# Patient Record
Sex: FEMALE | Race: WHITE | Employment: UNEMPLOYED | URBAN - METROPOLITAN AREA
[De-identification: names, ages, dates, MRNs, and addresses within clinical notes are randomized per-mention and may not be internally consistent; named-entity substitution may affect disease eponyms.]

---

## 2018-03-31 ENCOUNTER — APPOINTMENT (OUTPATIENT)
Dept: GENERAL RADIOLOGY | Age: 28
End: 2018-03-31
Attending: FAMILY MEDICINE

## 2018-03-31 ENCOUNTER — HOSPITAL ENCOUNTER (OUTPATIENT)
Age: 28
Discharge: HOME | End: 2018-03-31
Attending: FAMILY MEDICINE

## 2018-03-31 VITALS
RESPIRATION RATE: 18 BRPM | OXYGEN SATURATION: 100 % | DIASTOLIC BLOOD PRESSURE: 82 MMHG | HEART RATE: 90 BPM | SYSTOLIC BLOOD PRESSURE: 119 MMHG | TEMPERATURE: 99 F

## 2018-03-31 DIAGNOSIS — S93.401A SPRAIN OF RIGHT ANKLE, UNSPECIFIED LIGAMENT, INITIAL ENCOUNTER: Primary | ICD-10-CM

## 2018-03-31 PROCEDURE — 99203 OFFICE O/P NEW LOW 30 MIN: CPT

## 2018-03-31 PROCEDURE — 73610 X-RAY EXAM OF ANKLE: CPT | Performed by: FAMILY MEDICINE

## 2018-03-31 PROCEDURE — 73630 X-RAY EXAM OF FOOT: CPT | Performed by: FAMILY MEDICINE

## 2018-03-31 NOTE — ED INITIAL ASSESSMENT (HPI)
Per pt having right foot and ankle pain for one month has had different injuries. Stepped on screw, rolled ankle while moving and tripped over dog. Pt reports is avid runner.

## 2018-03-31 NOTE — ED PROVIDER NOTES
[address]: [address]    History   Patient presents with:  Lower Extremity Injury (musculoskeletal)    Stated Complaint: right ankle pain    HPI  64-year-old female presents with about a month of right foot and ank Musculoskeletal:        Right ankle: She exhibits normal range of motion, no swelling, no ecchymosis, no deformity, no laceration and normal pulse. Tenderness. Lateral malleolus and medial malleolus tenderness found.  No head of 5th metatarsal and no prox Hospital, X FOOT RT, 3/31/2007, 14:16.     INDICATIONS: Pain and swelling to right lateral metatarsals of foot for 1 month after several injuries.     TECHNIQUE: 3 views were obtained.       FINDINGS:   BONES: Normal.  No significant arthropathy, fracture, establish primary care        Medications Prescribed:  There are no discharge medications for this patient.

## 2018-03-31 NOTE — ED NOTES
Pt came on with air cast and crutches in place. Adjusted air cast for proper fitting. Pt leaving IC stable no acute distress noted.

## 2018-04-02 ENCOUNTER — APPOINTMENT (OUTPATIENT)
Dept: LAB | Facility: HOSPITAL | Age: 28
End: 2018-04-02
Attending: FAMILY MEDICINE

## 2018-04-02 ENCOUNTER — OFFICE VISIT (OUTPATIENT)
Dept: FAMILY MEDICINE CLINIC | Facility: CLINIC | Age: 28
End: 2018-04-02

## 2018-04-02 VITALS
HEART RATE: 92 BPM | DIASTOLIC BLOOD PRESSURE: 86 MMHG | WEIGHT: 141 LBS | BODY MASS INDEX: 22.13 KG/M2 | RESPIRATION RATE: 16 BRPM | OXYGEN SATURATION: 98 % | SYSTOLIC BLOOD PRESSURE: 132 MMHG | HEIGHT: 67 IN

## 2018-04-02 DIAGNOSIS — R10.9 ABDOMINAL PAIN, UNSPECIFIED ABDOMINAL LOCATION: ICD-10-CM

## 2018-04-02 DIAGNOSIS — T78.1XXA GASTROINTESTINAL FOOD SENSITIVITY: ICD-10-CM

## 2018-04-02 DIAGNOSIS — K58.9 IRRITABLE BOWEL SYNDROME, UNSPECIFIED TYPE: ICD-10-CM

## 2018-04-02 DIAGNOSIS — R10.9 ABDOMINAL PAIN, UNSPECIFIED ABDOMINAL LOCATION: Primary | ICD-10-CM

## 2018-04-02 PROCEDURE — 85652 RBC SED RATE AUTOMATED: CPT

## 2018-04-02 PROCEDURE — 83516 IMMUNOASSAY NONANTIBODY: CPT

## 2018-04-02 PROCEDURE — 83013 H PYLORI (C-13) BREATH: CPT

## 2018-04-02 PROCEDURE — 36415 COLL VENOUS BLD VENIPUNCTURE: CPT

## 2018-04-02 PROCEDURE — 99204 OFFICE O/P NEW MOD 45 MIN: CPT | Performed by: FAMILY MEDICINE

## 2018-04-02 PROCEDURE — 82784 ASSAY IGA/IGD/IGG/IGM EACH: CPT

## 2018-04-02 PROCEDURE — 86038 ANTINUCLEAR ANTIBODIES: CPT

## 2018-04-02 PROCEDURE — 86141 C-REACTIVE PROTEIN HS: CPT

## 2018-04-02 RX ORDER — DEXTROAMPHETAMINE SACCHARATE, AMPHETAMINE ASPARTATE, DEXTROAMPHETAMINE SULFATE AND AMPHETAMINE SULFATE 5; 5; 5; 5 MG/1; MG/1; MG/1; MG/1
20 TABLET ORAL 2 TIMES DAILY
Qty: 60 TABLET | Refills: 0 | Status: SHIPPED | OUTPATIENT
Start: 2018-04-02 | End: 2018-05-03

## 2018-04-02 NOTE — PATIENT INSTRUCTIONS
The products and items listed below (the “Products”)  and their claims have not been evaluated by the Food and Drug Administration. Dietary products are not intended to treat, prevent, mitigate or cure disease.  Ultimately, you must draw your own conclusion This is a carbon, soil-based product that helps to rebuild the tight junctions, or important connections between cells. These tight junctions get compromised by daily stress, reduce immune function and poor diet.  They are essential to the proper and health

## 2018-04-02 NOTE — PROGRESS NOTES
Tracey Garner is a 32year old female.   Patient presents with:  Urgent Care F/u: dos 3/31/18 for sprain of R ankle, feels weak 4x, stepped on screw  Abdominal Pain: abdominal cramps since HS      HPI:     Recently in inpatient hospital at [facility] Occupational History  None on file     Social History Main Topics   Smoking status: Never Smoker    Smokeless tobacco: Never Used    Alcohol use No    Drug use: Unknown     Other Topics Concern    Caffeine Concern Yes     Social History Narrative   None - SED RATE, WESTERGREN (AUTOMATED); Future  - GASTRO - INTERNAL    3. Irritable bowel syndrome, unspecified type  - GASTRO - INTERNAL    Evaluate for autoimmune disease and celiac disease  Referred to gastro for evaluation of GI symptoms, may need EGD.   Fo A liquid supplement for gut health. This is a carbon, soil-based product that helps to rebuild the tight junctions, or important connections between cells, in the intestines.  These tight junctions get compromised by daily stress, reduced immune function an This is a Food Sensitivity Test that measures sensitivities to up to 132 different foods, coloring and additives.  The Food Inflammation Test, also known as the FIT Test, was created by Eric López, Ph. D, who was involved in the creation of the first HIV/

## 2018-04-08 PROBLEM — K58.9 IRRITABLE BOWEL SYNDROME: Status: ACTIVE | Noted: 2018-04-08

## 2018-05-03 ENCOUNTER — OFFICE VISIT (OUTPATIENT)
Dept: FAMILY MEDICINE CLINIC | Facility: CLINIC | Age: 28
End: 2018-05-03

## 2018-05-03 VITALS
OXYGEN SATURATION: 99 % | BODY MASS INDEX: 22.29 KG/M2 | HEART RATE: 95 BPM | SYSTOLIC BLOOD PRESSURE: 110 MMHG | WEIGHT: 142 LBS | DIASTOLIC BLOOD PRESSURE: 62 MMHG | HEIGHT: 67 IN

## 2018-05-03 DIAGNOSIS — R10.9 ABDOMINAL PAIN, UNSPECIFIED ABDOMINAL LOCATION: ICD-10-CM

## 2018-05-03 DIAGNOSIS — K58.9 IRRITABLE BOWEL SYNDROME, UNSPECIFIED TYPE: Primary | ICD-10-CM

## 2018-05-03 DIAGNOSIS — F41.9 ANXIETY: ICD-10-CM

## 2018-05-03 DIAGNOSIS — F98.8 ATTENTION DEFICIT DISORDER, UNSPECIFIED HYPERACTIVITY PRESENCE: ICD-10-CM

## 2018-05-03 DIAGNOSIS — T78.1XXA GASTROINTESTINAL FOOD SENSITIVITY: ICD-10-CM

## 2018-05-03 PROCEDURE — 99214 OFFICE O/P EST MOD 30 MIN: CPT | Performed by: FAMILY MEDICINE

## 2018-05-03 RX ORDER — DEXTROAMPHETAMINE SACCHARATE, AMPHETAMINE ASPARTATE, DEXTROAMPHETAMINE SULFATE AND AMPHETAMINE SULFATE 5; 5; 5; 5 MG/1; MG/1; MG/1; MG/1
20 TABLET ORAL DAILY
Qty: 30 TABLET | Refills: 0 | Status: SHIPPED | OUTPATIENT
Start: 2018-07-02 | End: 2018-05-29

## 2018-05-03 RX ORDER — DEXTROAMPHETAMINE SACCHARATE, AMPHETAMINE ASPARTATE, DEXTROAMPHETAMINE SULFATE AND AMPHETAMINE SULFATE 5; 5; 5; 5 MG/1; MG/1; MG/1; MG/1
20 TABLET ORAL DAILY
Qty: 30 TABLET | Refills: 0 | Status: SHIPPED | OUTPATIENT
Start: 2018-05-03 | End: 2018-06-02

## 2018-05-03 RX ORDER — DEXTROAMPHETAMINE SACCHARATE, AMPHETAMINE ASPARTATE, DEXTROAMPHETAMINE SULFATE AND AMPHETAMINE SULFATE 5; 5; 5; 5 MG/1; MG/1; MG/1; MG/1
20 TABLET ORAL DAILY
Qty: 30 TABLET | Refills: 0 | Status: SHIPPED | OUTPATIENT
Start: 2018-06-02 | End: 2018-05-29

## 2018-05-03 RX ORDER — DIPHENHYDRAMINE HCL 25 MG
25 CAPSULE ORAL NIGHTLY PRN
COMMUNITY
End: 2020-03-03

## 2018-05-03 NOTE — PATIENT INSTRUCTIONS
The products and items listed below (the “Products”)  and their claims have not been evaluated by the Food and Drug Administration. Dietary products are not intended to treat, prevent, mitigate or cure disease.  Ultimately, you must draw your own conclusion Start taking 1/8 tsp daily and increase dose every 5-7 days until taking 1 tsp 3 times daily. Reduce to lowest dose tolerated if any reactions occur. Buy it online at  http://bCommunities.PushToTest/ or at the [address].           Hemp oil - CALM  For anxie

## 2018-05-03 NOTE — PROGRESS NOTES
Joselyn Torres is a 32year old female. Patient presents with: Follow - Up: regarding abdominal pain, anxiety, adhd  Follow - Up: ankle is feeling better      HPI:     Had been on zoloft 200mg in the past, caused severe amotivation.  Recently restarted [START ON 7/2/2018] amphetamine-dextroamphetamine (ADDERALL) 20 MG Oral Tab Take 1 tablet (20 mg total) by mouth daily.  Disp: 30 tablet Rfl: 0       SOCIAL HISTORY:     Social History  Social History   Marital status: Single  Spouse name: N/A    Years of e No orders of the defined types were placed in this encounter. Patient Instructions   The products and items listed below (the “Products”)  and their claims have not been evaluated by the Food and Drug Administration.  Dietary products are not intended A liquid supplement for gut health. This is a carbon, soil-based product that helps to rebuild the tight junctions, or important connections between cells, in the intestines.  These tight junctions get compromised by daily stress, reduced immune function an

## 2018-05-23 ENCOUNTER — APPOINTMENT (OUTPATIENT)
Dept: ULTRASOUND IMAGING | Facility: HOSPITAL | Age: 28
End: 2018-05-23
Attending: PHYSICIAN ASSISTANT
Payer: MEDICAID

## 2018-05-23 ENCOUNTER — HOSPITAL ENCOUNTER (EMERGENCY)
Facility: HOSPITAL | Age: 28
Discharge: HOME | End: 2018-05-23
Attending: PHYSICIAN ASSISTANT
Payer: MEDICAID

## 2018-05-23 ENCOUNTER — PATIENT MESSAGE (OUTPATIENT)
Dept: FAMILY MEDICINE CLINIC | Facility: CLINIC | Age: 28
End: 2018-05-23

## 2018-05-23 VITALS
SYSTOLIC BLOOD PRESSURE: 114 MMHG | DIASTOLIC BLOOD PRESSURE: 84 MMHG | HEART RATE: 69 BPM | TEMPERATURE: 98 F | RESPIRATION RATE: 15 BRPM | BODY MASS INDEX: 21.97 KG/M2 | WEIGHT: 140 LBS | HEIGHT: 67 IN | OXYGEN SATURATION: 98 %

## 2018-05-23 DIAGNOSIS — R30.0 DYSURIA: Primary | ICD-10-CM

## 2018-05-23 DIAGNOSIS — N30.00 ACUTE CYSTITIS WITHOUT HEMATURIA: ICD-10-CM

## 2018-05-23 DIAGNOSIS — Z97.5 IUD (INTRAUTERINE DEVICE) IN PLACE: Primary | ICD-10-CM

## 2018-05-23 PROCEDURE — 80048 BASIC METABOLIC PNL TOTAL CA: CPT | Performed by: PHYSICIAN ASSISTANT

## 2018-05-23 PROCEDURE — 93975 VASCULAR STUDY: CPT | Performed by: PHYSICIAN ASSISTANT

## 2018-05-23 PROCEDURE — 85025 COMPLETE CBC W/AUTO DIFF WBC: CPT | Performed by: PHYSICIAN ASSISTANT

## 2018-05-23 PROCEDURE — 96374 THER/PROPH/DIAG INJ IV PUSH: CPT

## 2018-05-23 PROCEDURE — 81025 URINE PREGNANCY TEST: CPT

## 2018-05-23 PROCEDURE — 87086 URINE CULTURE/COLONY COUNT: CPT | Performed by: PHYSICIAN ASSISTANT

## 2018-05-23 PROCEDURE — 87186 SC STD MICRODIL/AGAR DIL: CPT | Performed by: PHYSICIAN ASSISTANT

## 2018-05-23 PROCEDURE — 99284 EMERGENCY DEPT VISIT MOD MDM: CPT

## 2018-05-23 PROCEDURE — 76830 TRANSVAGINAL US NON-OB: CPT | Performed by: PHYSICIAN ASSISTANT

## 2018-05-23 PROCEDURE — 81001 URINALYSIS AUTO W/SCOPE: CPT | Performed by: PHYSICIAN ASSISTANT

## 2018-05-23 PROCEDURE — 76856 US EXAM PELVIC COMPLETE: CPT | Performed by: PHYSICIAN ASSISTANT

## 2018-05-23 PROCEDURE — 87077 CULTURE AEROBIC IDENTIFY: CPT | Performed by: PHYSICIAN ASSISTANT

## 2018-05-23 RX ORDER — CEPHALEXIN 500 MG/1
500 CAPSULE ORAL 4 TIMES DAILY
Qty: 28 CAPSULE | Refills: 0 | Status: SHIPPED | OUTPATIENT
Start: 2018-05-23 | End: 2018-05-30

## 2018-05-23 RX ORDER — KETOROLAC TROMETHAMINE 30 MG/ML
30 INJECTION, SOLUTION INTRAMUSCULAR; INTRAVENOUS ONCE
Status: COMPLETED | OUTPATIENT
Start: 2018-05-23 | End: 2018-05-23

## 2018-05-23 NOTE — ED INITIAL ASSESSMENT (HPI)
Patient presents to ER with c/o LLQ abd pain; patient recently had IUD placed 2 weeks ago.  + nausea.

## 2018-05-24 NOTE — ED PROVIDER NOTES
Patient Seen in: [facility] AND [facility] Emergency Department    History   Patient presents with:  Abdominal Pain    Stated Complaint: IUD problems    HPI    Merriam Cowden is a 32year old female who presents with chief complaint of left lower abdominal yvon Review of Systems    Positive for stated complaint: IUD problems  Other systems are as noted in HPI. Constitutional and vital signs reviewed. All other systems reviewed and negative except as noted above.     PSFH elements reviewed from today an extremities. Patient exhibits normal speech. Skin: Skin is normal to inspection. Warm and dry. No obvious bruising. No obvious rash.           ED Course     Labs Reviewed   URINALYSIS WITH CULTURE REFLEX - Abnormal; Notable for the following:        Re patient seen by Dr. Corita Dakins.     Disposition and Plan     Clinical Impression:  IUD (intrauterine device) in place  (primary encounter diagnosis)  Acute cystitis without hematuria    Disposition:  Discharge    Follow-up:  DO Tom Fan  RADHA 30

## 2018-05-24 NOTE — ED NOTES
Received pt from triage. Pt with c/o LLQ pain. Pt states she feels like it is her IUD that us causing her to have pain. Pt states she was unable to feel her \"IUD strings\" today. Pt states this pain has been going on for \"months. \" Abd tender, pt does no

## 2018-05-25 NOTE — TELEPHONE ENCOUNTER
From: Betty Rodriguez  To: Joselin Lin DO  Sent: 5/23/2018 7:12 AM CDT  Subject: Other    Hi Dr.    [facility], and I have been cramping and bleeding (not just spotting) and absolutely exhausted since May 4th.

## 2018-05-29 ENCOUNTER — OFFICE VISIT (OUTPATIENT)
Dept: GASTROENTEROLOGY | Facility: CLINIC | Age: 28
End: 2018-05-29

## 2018-05-29 VITALS
HEART RATE: 108 BPM | WEIGHT: 142 LBS | BODY MASS INDEX: 22.29 KG/M2 | HEIGHT: 67 IN | DIASTOLIC BLOOD PRESSURE: 87 MMHG | SYSTOLIC BLOOD PRESSURE: 125 MMHG

## 2018-05-29 DIAGNOSIS — K21.9 GASTROESOPHAGEAL REFLUX DISEASE, ESOPHAGITIS PRESENCE NOT SPECIFIED: ICD-10-CM

## 2018-05-29 DIAGNOSIS — K52.9 CHRONIC DIARRHEA: ICD-10-CM

## 2018-05-29 DIAGNOSIS — R10.32 LLQ PAIN: Primary | ICD-10-CM

## 2018-05-29 PROCEDURE — 99244 OFF/OP CNSLTJ NEW/EST MOD 40: CPT | Performed by: INTERNAL MEDICINE

## 2018-05-29 NOTE — H&P
History of present illness: This is a 41-year-old female patient referred by Dr. Ronnell Ewing for abdominal pain and cramping.   The patient was recently seen in the emergency room on May 23, 2018 for abdominal pain and underwent a workup which included ultras She denies illicit drug use. She is sexually active. She is on birth control    Family history: Reviewed, as above and as per medical record    Allergies no known drug allergies.   Patient states she has had extensive workup for allergies at [facility] indicated.

## 2018-05-29 NOTE — PROGRESS NOTES
HPI:    Patient ID: Ryan Flores is a 32year old female. HPI    Review of Systems   Constitutional: Negative for activity change, appetite change, chills, diaphoresis, fatigue, fever and unexpected weight change.    HENT: Negative for congestion, ea Allergies   PHYSICAL EXAM:   Physical Exam   Constitutional: She is oriented to person, place, and time. She appears well-developed and well-nourished. No distress. HENT:   Head: Normocephalic and atraumatic.    Mouth/Throat: Oropharynx is clear and moist

## 2018-05-29 NOTE — PATIENT INSTRUCTIONS
1.  Get stool test for Clostridium difficile toxin    2.   Schedule colonoscopy and EGD on the same day with split dose MiraLAX preparation and MAC at [facility].

## 2018-05-30 ENCOUNTER — TELEPHONE (OUTPATIENT)
Dept: GASTROENTEROLOGY | Facility: CLINIC | Age: 28
End: 2018-05-30

## 2018-05-30 DIAGNOSIS — K21.9 GASTROESOPHAGEAL REFLUX DISEASE, ESOPHAGITIS PRESENCE NOT SPECIFIED: ICD-10-CM

## 2018-05-30 DIAGNOSIS — R10.32 LLQ PAIN: Primary | ICD-10-CM

## 2018-05-30 DIAGNOSIS — K52.9 CHRONIC DIARRHEA: ICD-10-CM

## 2018-05-30 NOTE — TELEPHONE ENCOUNTER
Instructions     1. Get stool test for Clostridium difficile toxin     2.   Schedule colonoscopy and EGD on the same day with split dose MiraLAX preparation and MAC at [facility].        Routed to surgery scheduling

## 2018-05-31 ENCOUNTER — HOSPITAL ENCOUNTER (OUTPATIENT)
Age: 28
Discharge: HOME | End: 2018-05-31
Attending: FAMILY MEDICINE

## 2018-05-31 VITALS
SYSTOLIC BLOOD PRESSURE: 138 MMHG | DIASTOLIC BLOOD PRESSURE: 89 MMHG | WEIGHT: 142 LBS | BODY MASS INDEX: 22 KG/M2 | RESPIRATION RATE: 22 BRPM | HEART RATE: 81 BPM | OXYGEN SATURATION: 100 % | TEMPERATURE: 98 F

## 2018-05-31 DIAGNOSIS — R35.0 URINARY FREQUENCY: Primary | ICD-10-CM

## 2018-05-31 PROCEDURE — 87591 N.GONORRHOEAE DNA AMP PROB: CPT | Performed by: FAMILY MEDICINE

## 2018-05-31 PROCEDURE — 87086 URINE CULTURE/COLONY COUNT: CPT | Performed by: FAMILY MEDICINE

## 2018-05-31 PROCEDURE — 87491 CHLMYD TRACH DNA AMP PROBE: CPT | Performed by: FAMILY MEDICINE

## 2018-05-31 PROCEDURE — 99214 OFFICE O/P EST MOD 30 MIN: CPT

## 2018-05-31 PROCEDURE — 81025 URINE PREGNANCY TEST: CPT

## 2018-05-31 PROCEDURE — 81002 URINALYSIS NONAUTO W/O SCOPE: CPT

## 2018-05-31 RX ORDER — PHENAZOPYRIDINE HYDROCHLORIDE 100 MG/1
100 TABLET, FILM COATED ORAL 3 TIMES DAILY PRN
Qty: 9 TABLET | Refills: 0 | Status: SHIPPED | OUTPATIENT
Start: 2018-05-31 | End: 2018-06-07

## 2018-05-31 NOTE — ED INITIAL ASSESSMENT (HPI)
Urinary pressure, urinary frequency and urgency. Pt on antibx but not feeling better. Pt went to ER r/t pain. Pt unsure if fever, chills. Pt believes that she had allergic reaction last night, rash on arms.  Pt's uncle held her upper arms, works with [employer]

## 2018-05-31 NOTE — ED NOTES
Pt verbalized understanding of DC instructions. Will f/u as instructed. Will finish antibx as prescribed. Pt leaving IC stable.  Answered pt's questions to her satisfaction

## 2018-05-31 NOTE — ED PROVIDER NOTES
Patient Seen in: [address]    History   Patient presents with:  Urinary Symptoms (urologic)    Stated Complaint: uti symptoms; rash    HPI  64-year-old female presents to [address] [place] [address] as she was diagnosed with a UTI about a week reviewed and negative except as noted above.     Physical Exam   ED Triage Vitals [05/31/18 1341]  BP: 138/89  Pulse: 81  Resp: 22  Temp: 98.3 °F (36.8 °C)  Temp src: Oral  SpO2: 100 %  O2 Device: None (Room air)    Current:/89   Pulse 81   Temp 98.3 tearful and frustrated as she feels like her \"immune system is out of whack, she always feels like crap and believes there is a bigger systemic problem wrong. \"  Instructed to follow-up closely with her primary doctor for further evaluation and management

## 2018-06-01 ENCOUNTER — PATIENT MESSAGE (OUTPATIENT)
Dept: FAMILY MEDICINE CLINIC | Facility: CLINIC | Age: 28
End: 2018-06-01

## 2018-06-04 NOTE — TELEPHONE ENCOUNTER
CBLM to schedule procedure. [phone] or [phone] for scheduling. Or please transfer to [facility] in GI if unavailable.

## 2018-06-05 NOTE — TELEPHONE ENCOUNTER
Scheduled for:  Colonoscopy - 15809 & EGD - 52069  Provider Name:  Dr. Mirtha Hernadez  Date:  6/26/18  Location:  [facility]  Sedation:  MAC  Time:  2:00 pm (pt is aware to arrive at 1:00 pm)  Prep:  Miralax/Gatorade, Prep instructions were given to pt over the phone

## 2018-06-05 NOTE — TELEPHONE ENCOUNTER
Pt returned call. Pt is only available until 1pm today or after 5pm.  Please call. Attempted to transfer/no answer.

## 2018-06-14 ENCOUNTER — OFFICE VISIT (OUTPATIENT)
Dept: FAMILY MEDICINE CLINIC | Facility: CLINIC | Age: 28
End: 2018-06-14

## 2018-06-14 VITALS
HEIGHT: 67 IN | WEIGHT: 138 LBS | OXYGEN SATURATION: 98 % | DIASTOLIC BLOOD PRESSURE: 70 MMHG | SYSTOLIC BLOOD PRESSURE: 140 MMHG | HEART RATE: 98 BPM | BODY MASS INDEX: 21.66 KG/M2

## 2018-06-14 DIAGNOSIS — F41.9 ANXIETY: ICD-10-CM

## 2018-06-14 DIAGNOSIS — R10.9 ABDOMINAL PAIN, UNSPECIFIED ABDOMINAL LOCATION: ICD-10-CM

## 2018-06-14 DIAGNOSIS — K58.9 IRRITABLE BOWEL SYNDROME, UNSPECIFIED TYPE: Primary | ICD-10-CM

## 2018-06-14 PROCEDURE — 99214 OFFICE O/P EST MOD 30 MIN: CPT | Performed by: FAMILY MEDICINE

## 2018-06-14 NOTE — PROGRESS NOTES
Sanna Cassie is a 32year old female.   Patient presents with:  Migraine: c/o left side cramping and pain,       HPI:     Still getting cramping, bloating, abdominal pain  Feels like she could be allergic to the generic zoloft  Mood has been better with Alcohol use No    Drug use: Unknown     Other Topics Concern    Caffeine Concern Yes     Social History Narrative   None on file       SURGICAL HISTORY:   No past surgical history on file.     PHYSICAL EXAM:      06/14/18  1334   BP: 140/70   Pulse: 98   S The products and items listed below (the “Products”)  and their claims have not been evaluated by the Food and Drug Administration. Dietary products are not intended to treat, prevent, mitigate or cure disease.  Ultimately, you must draw your own conclusion

## 2018-06-22 ENCOUNTER — TELEPHONE (OUTPATIENT)
Dept: GASTROENTEROLOGY | Facility: CLINIC | Age: 28
End: 2018-06-22

## 2018-06-22 DIAGNOSIS — K52.9 CHRONIC DIARRHEA: ICD-10-CM

## 2018-06-22 DIAGNOSIS — K21.9 GASTROESOPHAGEAL REFLUX DISEASE, ESOPHAGITIS PRESENCE NOT SPECIFIED: ICD-10-CM

## 2018-06-22 DIAGNOSIS — R10.32 ABDOMINAL PAIN, LEFT LOWER QUADRANT: Primary | ICD-10-CM

## 2018-06-22 NOTE — TELEPHONE ENCOUNTER
Cancelled for:  Colonoscopy 67317 and  EGD [id number]  Provider Name:  Dr. Herson Carter  Date:  6/26/18  Location:  [address] [place] [address],[facility]  Sedation:  MAC  Time:  1400  Prep:  Miralax/Gatorade  Meds/Allergies Reconciled?:    Diagnosis with codes:  LLQ pain - R10.32, Chronic diarrhea - K

## 2018-06-23 ENCOUNTER — APPOINTMENT (OUTPATIENT)
Dept: GENERAL RADIOLOGY | Age: 28
End: 2018-06-23
Attending: FAMILY MEDICINE
Payer: MEDICAID

## 2018-06-23 ENCOUNTER — HOSPITAL ENCOUNTER (OUTPATIENT)
Age: 28
Discharge: HOME | End: 2018-06-23
Attending: FAMILY MEDICINE
Payer: MEDICAID

## 2018-06-23 VITALS
HEART RATE: 92 BPM | TEMPERATURE: 99 F | OXYGEN SATURATION: 100 % | RESPIRATION RATE: 20 BRPM | DIASTOLIC BLOOD PRESSURE: 74 MMHG | SYSTOLIC BLOOD PRESSURE: 121 MMHG

## 2018-06-23 DIAGNOSIS — M54.50 CHRONIC BILATERAL LOW BACK PAIN WITHOUT SCIATICA: ICD-10-CM

## 2018-06-23 DIAGNOSIS — G89.29 CHRONIC BILATERAL LOW BACK PAIN WITHOUT SCIATICA: ICD-10-CM

## 2018-06-23 DIAGNOSIS — M54.2 CHRONIC NECK PAIN: Primary | ICD-10-CM

## 2018-06-23 DIAGNOSIS — G89.29 CHRONIC NECK PAIN: Primary | ICD-10-CM

## 2018-06-23 PROCEDURE — 81025 URINE PREGNANCY TEST: CPT

## 2018-06-23 PROCEDURE — 72100 X-RAY EXAM L-S SPINE 2/3 VWS: CPT | Performed by: FAMILY MEDICINE

## 2018-06-23 PROCEDURE — 99214 OFFICE O/P EST MOD 30 MIN: CPT

## 2018-06-23 PROCEDURE — 72040 X-RAY EXAM NECK SPINE 2-3 VW: CPT | Performed by: FAMILY MEDICINE

## 2018-06-23 RX ORDER — DEXTROAMPHETAMINE SACCHARATE, AMPHETAMINE ASPARTATE MONOHYDRATE, DEXTROAMPHETAMINE SULFATE AND AMPHETAMINE SULFATE 1.25; 1.25; 1.25; 1.25 MG/1; MG/1; MG/1; MG/1
5 CAPSULE, EXTENDED RELEASE ORAL EVERY MORNING
COMMUNITY
End: 2018-07-23

## 2018-06-23 NOTE — ED INITIAL ASSESSMENT (HPI)
Patient presents with low back and cervical neck pain which she states is chronic. She is requesting x-rays of both areas today because she states she is constantly hurting herself.

## 2018-06-23 NOTE — ED PROVIDER NOTES
[address] Seen [address]: [address]    History   Patient presents with:  Back Pain (musculoskeletal)    Stated Complaint: LOWER LUMBAR    HPI  64-year-old female with a past medical history significant for ADD, anxiety and depre Neck: Normal range of motion. Neck supple. Cardiovascular: Normal rate. Pulmonary/Chest: Effort normal.   Musculoskeletal:        Cervical back: Normal.        Lumbar back: Normal.   Strength 5/5 throughout. Skin: She is not diaphoretic.    Nursin SPINE AP LAT OBLS, 3/05/2013, 16:43.     INDICATIONS: Chronic, consistent lower lumbar spine pain for over 1 year.  No known injury.     TECHNIQUE: Lumbar spine radiographs (4 views)       FINDINGS:      ALIGNMENT: Normal alignment.    VERTEBRAL BODIES:

## 2018-07-23 ENCOUNTER — OFFICE VISIT (OUTPATIENT)
Dept: OBGYN CLINIC | Facility: CLINIC | Age: 28
End: 2018-07-23
Payer: MEDICAID

## 2018-07-23 ENCOUNTER — OFFICE VISIT (OUTPATIENT)
Dept: FAMILY MEDICINE CLINIC | Facility: CLINIC | Age: 28
End: 2018-07-23
Payer: MEDICAID

## 2018-07-23 ENCOUNTER — APPOINTMENT (OUTPATIENT)
Dept: LAB | Facility: HOSPITAL | Age: 28
End: 2018-07-23
Attending: CLINICAL NURSE SPECIALIST
Payer: MEDICAID

## 2018-07-23 VITALS
HEART RATE: 83 BPM | DIASTOLIC BLOOD PRESSURE: 76 MMHG | WEIGHT: 147 LBS | BODY MASS INDEX: 23 KG/M2 | SYSTOLIC BLOOD PRESSURE: 112 MMHG

## 2018-07-23 VITALS
WEIGHT: 143 LBS | SYSTOLIC BLOOD PRESSURE: 118 MMHG | DIASTOLIC BLOOD PRESSURE: 82 MMHG | OXYGEN SATURATION: 98 % | HEART RATE: 92 BPM | HEIGHT: 67 IN | BODY MASS INDEX: 22.44 KG/M2

## 2018-07-23 DIAGNOSIS — K58.9 IRRITABLE BOWEL SYNDROME, UNSPECIFIED TYPE: Primary | ICD-10-CM

## 2018-07-23 DIAGNOSIS — G43.909 MIGRAINE WITHOUT STATUS MIGRAINOSUS, NOT INTRACTABLE, UNSPECIFIED MIGRAINE TYPE: ICD-10-CM

## 2018-07-23 DIAGNOSIS — R10.2 PELVIC PAIN: ICD-10-CM

## 2018-07-23 DIAGNOSIS — F98.8 ATTENTION DEFICIT DISORDER, UNSPECIFIED HYPERACTIVITY PRESENCE: ICD-10-CM

## 2018-07-23 DIAGNOSIS — T78.40XD ALLERGIC REACTION, SUBSEQUENT ENCOUNTER: ICD-10-CM

## 2018-07-23 DIAGNOSIS — N93.8 DUB (DYSFUNCTIONAL UTERINE BLEEDING): ICD-10-CM

## 2018-07-23 DIAGNOSIS — F32.89 OTHER DEPRESSION: ICD-10-CM

## 2018-07-23 DIAGNOSIS — Z83.3 FAMILY HISTORY OF DIABETES MELLITUS: ICD-10-CM

## 2018-07-23 DIAGNOSIS — F41.9 ANXIETY: ICD-10-CM

## 2018-07-23 DIAGNOSIS — M62.89 PELVIC FLOOR TENSION: ICD-10-CM

## 2018-07-23 DIAGNOSIS — M54.50 CHRONIC BILATERAL LOW BACK PAIN WITHOUT SCIATICA: ICD-10-CM

## 2018-07-23 DIAGNOSIS — M54.2 NECK PAIN: ICD-10-CM

## 2018-07-23 DIAGNOSIS — G89.29 CHRONIC BILATERAL LOW BACK PAIN WITHOUT SCIATICA: ICD-10-CM

## 2018-07-23 DIAGNOSIS — N89.8 VAGINAL DISCHARGE: ICD-10-CM

## 2018-07-23 DIAGNOSIS — R10.2 PELVIC PAIN: Primary | ICD-10-CM

## 2018-07-23 DIAGNOSIS — R10.9 ABDOMINAL PAIN, UNSPECIFIED ABDOMINAL LOCATION: ICD-10-CM

## 2018-07-23 PROBLEM — F32.A DEPRESSION: Status: ACTIVE | Noted: 2018-07-23

## 2018-07-23 LAB — TSH SERPL-ACNC: 1.28 UIU/ML (ref 0.45–5.33)

## 2018-07-23 PROCEDURE — 36415 COLL VENOUS BLD VENIPUNCTURE: CPT

## 2018-07-23 PROCEDURE — 99215 OFFICE O/P EST HI 40 MIN: CPT | Performed by: FAMILY MEDICINE

## 2018-07-23 PROCEDURE — 84443 ASSAY THYROID STIM HORMONE: CPT

## 2018-07-23 PROCEDURE — 83036 HEMOGLOBIN GLYCOSYLATED A1C: CPT

## 2018-07-23 PROCEDURE — 99204 OFFICE O/P NEW MOD 45 MIN: CPT | Performed by: CLINICAL NURSE SPECIALIST

## 2018-07-23 RX ORDER — LEVONORGESTREL / ETHINYL ESTRADIOL AND ETHINYL ESTRADIOL 150-30(84)
1 KIT ORAL
COMMUNITY
Start: 2017-11-09 | End: 2018-07-23

## 2018-07-23 RX ORDER — DEXTROAMPHETAMINE SACCHARATE, AMPHETAMINE ASPARTATE, DEXTROAMPHETAMINE SULFATE AND AMPHETAMINE SULFATE 5; 5; 5; 5 MG/1; MG/1; MG/1; MG/1
TABLET ORAL
Refills: 0 | COMMUNITY
Start: 2018-07-02 | End: 2018-07-23

## 2018-07-23 RX ORDER — ALBUTEROL SULFATE 90 UG/1
2 AEROSOL, METERED RESPIRATORY (INHALATION)
COMMUNITY
Start: 2017-11-09 | End: 2019-11-21

## 2018-07-23 RX ORDER — CETIRIZINE HYDROCHLORIDE 10 MG/1
10 TABLET ORAL
COMMUNITY
End: 2018-07-26

## 2018-07-23 RX ORDER — MONTELUKAST SODIUM 10 MG/1
10 TABLET ORAL
COMMUNITY
Start: 2017-11-09 | End: 2019-10-02

## 2018-07-23 RX ORDER — DEXTROAMPHETAMINE SACCHARATE, AMPHETAMINE ASPARTATE MONOHYDRATE, DEXTROAMPHETAMINE SULFATE AND AMPHETAMINE SULFATE 1.25; 1.25; 1.25; 1.25 MG/1; MG/1; MG/1; MG/1
5 CAPSULE, EXTENDED RELEASE ORAL EVERY MORNING
Qty: 30 CAPSULE | Refills: 0 | Status: SHIPPED | OUTPATIENT
Start: 2018-07-23 | End: 2019-11-21

## 2018-07-23 NOTE — PROGRESS NOTES
Zenaida Collet is a 25year old female. Patient presents with: Follow - Up      HPI:     Migraines are better off the sertraline. Still getting them   3-4x per week, but not as intense.      Had an intense episode while driving, felt like she couldn't f IUD by Intrauterine route. Disp:  Rfl:    MISC NATURAL PRODUCT OP Apply to eye. Ashwaganda, Medical Cannabis, Turmeric, Curcumin, Adrenal Health herbs, all taken once daily.  Disp:  Rfl:    Levocetirizine Dihydrochloride (XYZAL) 5 MG Oral Tab Take 1 tablet Migraine without status migrainosus, not intractable, unspecified migraine type  - CT BRAIN OR HEAD (52712); Future    6. Other depression    7. Allergic reaction, subsequent encounter  - ALLERGY - INTERNAL    8.  Neck pain  - PHYSICAL THERAPY - INTERNAL

## 2018-07-24 LAB — HBA1C MFR BLD: 5.2 % (ref 4–6)

## 2018-07-25 LAB
GENITAL VAGINOSIS SCREEN: NEGATIVE
TRICHOMONAS SCREEN: NEGATIVE

## 2018-07-25 NOTE — PROGRESS NOTES
Zeina Jones is a 25year old female [facility] Patient's last menstrual period was 06/30/2018 (approximate). Patient presents with:  Gyn Problem: pelvic pain, irregular periods  New patient. Here with c/o irregular periods and ongoing pelvic pain.  Since able to figure out how to fix it\". Thinking she may have endometriosis and states \"I need a diagnosis so I can get assistance and have accommodations for work\".  Tried to explained endometriosis and reviewed normal US she had 5/23/18-states she already k control/ protection: IUD       MEDICAL HISTORY:        Past Medical History:   Diagnosis Date   • ADD (attention deficit disorder)    • Anxiety     not a current a issue   • Depression     not an issue   • Immunocompromised ([facility] [address]    • Migraine headache RELIEF) 25 MG Oral Cap, Take 25 mg by mouth nightly as needed for Itching., Disp: , Rfl:   •  Levonorgestrel 18.6 MCG/DAY Intrauterine IUD, by Intrauterine route., Disp: , Rfl:     ALLERGIES:    Sertraline              HIVES  Lexapro [Escitalopr*    OTHER there has already been an improvement in bleeding and pain  At least 45 min spent face to face with pt

## 2018-07-26 ENCOUNTER — OFFICE VISIT (OUTPATIENT)
Dept: ALLERGY | Facility: CLINIC | Age: 28
End: 2018-07-26
Payer: MEDICAID

## 2018-07-26 ENCOUNTER — LAB ENCOUNTER (OUTPATIENT)
Dept: LAB | Age: 28
End: 2018-07-26
Attending: ALLERGY & IMMUNOLOGY
Payer: MEDICAID

## 2018-07-26 VITALS
DIASTOLIC BLOOD PRESSURE: 75 MMHG | SYSTOLIC BLOOD PRESSURE: 98 MMHG | HEART RATE: 84 BPM | WEIGHT: 145.63 LBS | BODY MASS INDEX: 23.41 KG/M2 | OXYGEN SATURATION: 98 % | RESPIRATION RATE: 16 BRPM | TEMPERATURE: 98 F | HEIGHT: 66.3 IN

## 2018-07-26 DIAGNOSIS — Z91.018 FOOD ALLERGY: ICD-10-CM

## 2018-07-26 DIAGNOSIS — J30.9 ALLERGIC RHINITIS, UNSPECIFIED SEASONALITY, UNSPECIFIED TRIGGER: Primary | ICD-10-CM

## 2018-07-26 DIAGNOSIS — T78.1XXA ADVERSE FOOD REACTION, INITIAL ENCOUNTER: ICD-10-CM

## 2018-07-26 PROCEDURE — 36415 COLL VENOUS BLD VENIPUNCTURE: CPT

## 2018-07-26 PROCEDURE — 99244 OFF/OP CNSLTJ NEW/EST MOD 40: CPT | Performed by: ALLERGY & IMMUNOLOGY

## 2018-07-26 PROCEDURE — 86003 ALLG SPEC IGE CRUDE XTRC EA: CPT

## 2018-07-26 PROCEDURE — 86003 ALLG SPEC IGE CRUDE XTRC EA: CPT | Performed by: ALLERGY & IMMUNOLOGY

## 2018-07-26 PROCEDURE — 99212 OFFICE O/P EST SF 10 MIN: CPT | Performed by: ALLERGY & IMMUNOLOGY

## 2018-07-26 PROCEDURE — 82785 ASSAY OF IGE: CPT | Performed by: ALLERGY & IMMUNOLOGY

## 2018-07-26 RX ORDER — LEVOCETIRIZINE DIHYDROCHLORIDE 5 MG/1
5 TABLET, FILM COATED ORAL NIGHTLY
Qty: 30 TABLET | Refills: 0 | Status: SHIPPED | OUTPATIENT
Start: 2018-07-26 | End: 2019-11-21

## 2018-07-26 NOTE — PROGRESS NOTES
Marii Bonilla is a 25year old female. HPI:   Patient presents with: Allergies: New pt.  Pt presents with concern that her allergies are causing her to have swelling of skin, rash, hives of skin and c/o sinus reaction and asthma like reactions w/ cou Grandmother       Social History: Smoking status: Current Some Day Smoker                                                    Packs/day: 0.00      Years: 0.00      Smokeless tobacco: Never Used                      Comment: 1 cigarette a month  Alcohol use: bruising  Integumentary:  Negative for pruritus.  See hpi   Musculoskeletal:  Negative for joint symptoms  Neurological:  Negative for dizziness, seizures  Psychiatric:  Negative for inappropriate interaction and psychiatric symptoms  Respiratory:  Negative This Encounter      Allergens, Zone 8      Adult Food Allergy Prof      Lobster      Pineapple      Wes      Gluten    Meds This Visit:    Signed Prescriptions Disp Refills    Levocetirizine Dihydrochloride (XYZAL) 5 MG Oral Tab 30 tablet 0      Sig: [facility]

## 2018-07-26 NOTE — PATIENT INSTRUCTIONS
Recs:  Check serum IgE profile to common environmental allergens  Check serum IgE profile to common food allergens as well as lobster gluten and pineapple  We will call with results  Trial of Xyzal 5 mg once a night at bedtime  Handouts on allergies and av

## 2018-07-28 LAB
ALLERGEN, MANGO IGE: <0.1 KU/L
ALLERGEN, PINEAPPLE IGE: <0.1 KU/L

## 2018-07-31 ENCOUNTER — TELEPHONE (OUTPATIENT)
Dept: ALLERGY | Facility: CLINIC | Age: 28
End: 2018-07-31

## 2018-07-31 LAB
A ALTERNATA IGE QN: <0.1 KUA/L (ref ?–0.1)
A FUMIGATUS IGE QN: <0.1 KUA/L (ref ?–0.1)
AMER SYCAMORE IGE QN: <0.1 KUA/L (ref ?–0.1)
BERMUDA GRASS IGE QN: <0.1 KUA/L (ref ?–0.1)
BOXELDER IGE QN: <0.1 KUA/L (ref ?–0.1)
C HERBARUM IGE QN: <0.1 KUA/L (ref ?–0.1)
CALIF WALNUT IGE QN: <0.1 KUA/L (ref ?–0.1)
CAT DANDER IGE QN: <0.1 KUA/L (ref ?–0.1)
CLAM IGE QN: <0.1 KUA/L (ref ?–0.1)
CMN PIGWEED IGE QN: <0.1 KUA/L (ref ?–0.1)
CODFISH IGE QN: <0.1 KUA/L (ref ?–0.1)
COMMON RAGWEED IGE QN: <0.1 KUA/L (ref ?–0.1)
CORN IGE QN: <0.1 KUA/L (ref ?–0.1)
COTTONWOOD IGE QN: <0.1 KUA/L (ref ?–0.1)
COW MILK IGE QN: <0.1 KUA/L (ref ?–0.1)
D FARINAE IGE QN: <0.1 KUA/L (ref ?–0.1)
D PTERONYSS IGE QN: <0.1 KUA/L (ref ?–0.1)
DOG DANDER IGE QN: <0.1 KUA/L (ref ?–0.1)
EGG WHITE IGE QN: <0.1 KUA/L (ref ?–0.1)
GLUTEN IGE QN: <0.1 KUA/L (ref ?–0.1)
IGE SERPL-ACNC: 37.5 KU/L (ref 2–214)
IGE SERPL-ACNC: 37.5 KU/L (ref 2–214)
LOBSTER IGE QN: <0.1 KUA/L (ref ?–0.1)
M RACEMOSUS IGE QN: <0.1 KUA/L (ref ?–0.1)
MARSH ELDER IGE QN: <0.1 KUA/L (ref ?–0.1)
MOUSE EPITH IGE QN: <0.1 KUA/L (ref ?–0.1)
MT JUNIPER IGE QN: <0.1 KUA/L (ref ?–0.1)
P NOTATUM IGE QN: <0.1 KUA/L (ref ?–0.1)
PEANUT IGE QN: <0.1 KUA/L (ref ?–0.1)
PECAN/HICK TREE IGE QN: <0.1 KUA/L (ref ?–0.1)
ROACH IGE QN: <0.1 KUA/L (ref ?–0.1)
SALTWORT IGE QN: <0.1 KUA/L (ref ?–0.1)
SCALLOP IGE QN: <0.1 KUA/L (ref ?–0.1)
SESAME SEED IGE QN: <0.1 KUA/L (ref ?–0.1)
SHRIMP IGE QN: <0.1 KUA/L (ref ?–0.1)
SOYBEAN IGE QN: <0.1 KUA/L (ref ?–0.1)
TIMOTHY IGE QN: <0.1 KUA/L (ref ?–0.1)
WALNUT IGE QN: <0.1 KUA/L (ref ?–0.1)
WHEAT IGE QN: <0.1 KUA/L (ref ?–0.1)
WHITE ASH IGE QN: <0.1 KUA/L (ref ?–0.1)
WHITE ELM IGE QN: <0.1 KUA/L (ref ?–0.1)
WHITE MULBERRY IGE QN: <0.1 KUA/L (ref ?–0.1)
WHITE OAK IGE QN: <0.1 KUA/L (ref ?–0.1)

## 2018-07-31 NOTE — TELEPHONE ENCOUNTER
----- Message from Venkatesh Dennis MD sent at 7/30/2018  8:10 AM CDT -----  Elsi Franco, your serum IgE testing to select foods has returned and was negative to verito and plan. . This makes a food allergy less likely.   If these foods still continue to cause her

## 2018-07-31 NOTE — TELEPHONE ENCOUNTER
Spoke to patient. Verified name &  Reviewed w/ patient the following test result and recommendation in the message below per Dr. Jason Iqbal.     Also, reviewed w/ patient the following per Dr. Jason Iqbal via [employer]:    [place] Sero , Your blood testing/IgE testing  to

## 2018-08-01 ENCOUNTER — OFFICE VISIT (OUTPATIENT)
Dept: PHYSICAL THERAPY | Facility: HOSPITAL | Age: 28
End: 2018-08-01
Attending: FAMILY MEDICINE
Payer: MEDICAID

## 2018-08-01 DIAGNOSIS — G89.29 CHRONIC BILATERAL LOW BACK PAIN WITHOUT SCIATICA: ICD-10-CM

## 2018-08-01 DIAGNOSIS — M54.2 NECK PAIN: ICD-10-CM

## 2018-08-01 DIAGNOSIS — M54.50 CHRONIC BILATERAL LOW BACK PAIN WITHOUT SCIATICA: ICD-10-CM

## 2018-08-01 PROCEDURE — 97163 PT EVAL HIGH COMPLEX 45 MIN: CPT

## 2018-08-02 NOTE — PROGRESS NOTES
LUMBAR SPINE EVALUATION:   Referring Physician: Dr. Morena Jones  Diagnosis: Chronic bilateral low back pain without sciatica (M54.5,G89.29)  Neck pain (M54.2)     Date of Service: 8/1/2018   Date of Onset: February 2018     PATIENT SUMMARY   The patient is a feelings and feels safe in her environment. She vaguely reported symptoms of PTSD. Seeking professional assistance from Brutus Mcardle was discussed and the patient was receptive. She was given information to schedule an appt. PAS C/s 8/10;  Location: alarm system, resulting in hypersensitive nerves.  Metaphors incorporated to foster deep learning and paradigm shift for patient      Patient was instructed in and issued a HEP for: cervical stability; suboccipital release with tennis balls   Charges: PT Ev Date____________________    Certification From: 4/8/3109  To:10/30/2018

## 2018-08-07 ENCOUNTER — OFFICE VISIT (OUTPATIENT)
Dept: PHYSICAL THERAPY | Facility: HOSPITAL | Age: 28
End: 2018-08-07
Attending: CLINICAL NURSE SPECIALIST
Payer: MEDICAID

## 2018-08-07 DIAGNOSIS — M62.89 PELVIC FLOOR TENSION: ICD-10-CM

## 2018-08-07 PROCEDURE — 97162 PT EVAL MOD COMPLEX 30 MIN: CPT

## 2018-08-07 PROCEDURE — 97140 MANUAL THERAPY 1/> REGIONS: CPT

## 2018-08-07 PROCEDURE — 97535 SELF CARE MNGMENT TRAINING: CPT

## 2018-08-07 RX ORDER — DEXTROAMPHETAMINE SACCHARATE, AMPHETAMINE ASPARTATE MONOHYDRATE, DEXTROAMPHETAMINE SULFATE AND AMPHETAMINE SULFATE 1.25; 1.25; 1.25; 1.25 MG/1; MG/1; MG/1; MG/1
5 CAPSULE, EXTENDED RELEASE ORAL EVERY MORNING
Qty: 30 CAPSULE | Refills: 0 | OUTPATIENT
Start: 2018-08-07

## 2018-08-07 NOTE — PROGRESS NOTES
MUSCULOSKELETAL AND PELVIC FLOOR EVALUATION:   Referring Physician: Dr. Isabella Singletary  Diagnosis: Pelvic floor tension (N34.3)  Date of Onset: 5 years ago  Date of Service: 8/7/2018     PATIENT SUMMARY   Xiomara Kyle is a 25year old y/o female who presents pain, and bowel/bladder dysfunction. *Pt reported history of behavioral health treatment, stating she was recently hospitalized for excessive alcohol consumption and suicidal thoughts.  Per pt report, she has seen several phycologists in the past for de any dyspareunia with initial/deep penetration, but reports presence of clenching of pelvic floor, almost spasm like, non-specific to position/stimulation.  Reports she is able to reduce with relaxation/deep breathing, but sometimes has to cease intercourse Physical Therapy to address the above stated impairments in order to return patient to prior level of function. Patient was instructed in and administered HEP; patient verbally and visually demonstrated understanding.            OBJECTIVE:   PMH was discuss complaints: N/A  Urine Stream: sometimes difficulty initiating, stream starts off strong then sprays  Frequency: every 3-4 hours  Amount: medium   Urine Stop test: yes, able to stop  Post void dribble: sometimes   Hovering: no   Empty bladder just in case: WNL  Hip AROM/PROM screen: WNL    STRENGTH (MMT TESTING)  Hip: Left Right Comments   Flexion 4+/5 4+/5    Extension 4/5 4/5    Abduction 4/5 4/5    Adduction 5/5 5/5    External Rotation 4+/5 4+/5    Internal Rotation 4+/5 4+/5    Knee:      Flexion 5/5 5/ trigger point, pain and tenderness moderate restriction, trigger point, pain and tenderness    Iliococcygeus moderate restriction, trigger point, pain and tenderness moderate restriction, trigger point, pain and tenderness    Coccygeus moderate restriction throughout all 3 layers with internal vaginal exam to allow for yearly pelvic exams, tampon use, and intimacy with partner.    3. Patient to demonstrate improved PF strength, 3/5 MMT grade for at least 10 seconds endurance to reduce urinary/fecal incontinen

## 2018-08-08 ENCOUNTER — APPOINTMENT (OUTPATIENT)
Dept: PHYSICAL THERAPY | Facility: HOSPITAL | Age: 28
End: 2018-08-08
Attending: FAMILY MEDICINE
Payer: MEDICAID

## 2018-08-10 ENCOUNTER — OFFICE VISIT (OUTPATIENT)
Dept: PHYSICAL THERAPY | Facility: HOSPITAL | Age: 28
End: 2018-08-10
Attending: FAMILY MEDICINE
Payer: MEDICAID

## 2018-08-10 PROCEDURE — 97110 THERAPEUTIC EXERCISES: CPT

## 2018-08-10 PROCEDURE — 97112 NEUROMUSCULAR REEDUCATION: CPT

## 2018-08-10 PROCEDURE — 97140 MANUAL THERAPY 1/> REGIONS: CPT

## 2018-08-10 NOTE — PROGRESS NOTES
Diagnosis: Chronic bilateral low back pain without sciatica (M54.5,G89.29)  Neck pain (M54.2)         Authorized # of Visits: NA  Insurance: Medicaid           Next MD visit: (post therapy)    Fall Risk: standard         Precautions:  (migraine; Immunocomp 10 minutes at 3.0 mph necessary to participate in community events and return to the work force        Plan:   Improve spinal stability, posture, body mechanics, and physio-psycho-social condition     Charges: there ex 1; man mob x 1 ; neuro re-ed x1  Tota

## 2018-08-13 ENCOUNTER — APPOINTMENT (OUTPATIENT)
Dept: LAB | Facility: HOSPITAL | Age: 28
End: 2018-08-13
Attending: INTERNAL MEDICINE
Payer: MEDICAID

## 2018-08-13 ENCOUNTER — TELEPHONE (OUTPATIENT)
Dept: OBGYN CLINIC | Facility: CLINIC | Age: 28
End: 2018-08-13

## 2018-08-13 DIAGNOSIS — I73.00 RAYNAUD'S DISEASE WITHOUT GANGRENE: ICD-10-CM

## 2018-08-13 DIAGNOSIS — R53.83 FATIGUE, UNSPECIFIED TYPE: ICD-10-CM

## 2018-08-13 LAB
ALBUMIN SERPL BCP-MCNC: 3.8 G/DL (ref 3.5–4.8)
ALP SERPL-CCNC: 44 U/L (ref 32–100)
ALT SERPL-CCNC: 12 U/L (ref 14–54)
AST SERPL-CCNC: 13 U/L (ref 15–41)
BILIRUB DIRECT SERPL-MCNC: 0.1 MG/DL (ref 0–0.2)
BILIRUB SERPL-MCNC: 0.4 MG/DL (ref 0.3–1.2)
CRP SERPL-MCNC: <0.5 MG/DL (ref 0–0.9)
ERYTHROCYTE [SEDIMENTATION RATE] IN BLOOD: 6 MM/HR (ref 0–20)
PROT SERPL-MCNC: 6.6 G/DL (ref 5.9–8.4)
RHEUMATOID FACT SER QL: <5 IU/ML

## 2018-08-13 PROCEDURE — 80076 HEPATIC FUNCTION PANEL: CPT

## 2018-08-13 PROCEDURE — 84165 PROTEIN E-PHORESIS SERUM: CPT

## 2018-08-13 PROCEDURE — 86140 C-REACTIVE PROTEIN: CPT

## 2018-08-13 PROCEDURE — 86038 ANTINUCLEAR ANTIBODIES: CPT

## 2018-08-13 PROCEDURE — 86431 RHEUMATOID FACTOR QUANT: CPT

## 2018-08-13 PROCEDURE — 85652 RBC SED RATE AUTOMATED: CPT

## 2018-08-13 PROCEDURE — 36415 COLL VENOUS BLD VENIPUNCTURE: CPT

## 2018-08-13 NOTE — PROGRESS NOTES
Dear Dr. Franklin Snow:    I saw your patient Graeme Beebe in consultation this morning at your request for evaluation of possible autoimmune problem. As you know, she is a 78-year-old woman who has been felt to have irritable bowel syndrome.  She has anxiety, straightening, consistent with muscle spasm. Lumbar spine x-rays were normal.  Right ankle and foot x-rays were normal done March of 2018.     Past medical history:  She has a history of acid reflux which is been better with low acid diet, no gluten diet, hepatosplenomegaly or tenderness. Normal bowel sounds. No lower extremity edema. Neck moves well. Shoulders move well. Elbows flex and extend fully. There is no synovitis in her wrists or hands. Lumbar spine flexion is normal.  Hips move well.   Knee

## 2018-08-14 ENCOUNTER — TELEPHONE (OUTPATIENT)
Dept: FAMILY MEDICINE CLINIC | Facility: CLINIC | Age: 28
End: 2018-08-14

## 2018-08-14 ENCOUNTER — OFFICE VISIT (OUTPATIENT)
Dept: PHYSICAL THERAPY | Facility: HOSPITAL | Age: 28
End: 2018-08-14
Attending: CLINICAL NURSE SPECIALIST
Payer: MEDICAID

## 2018-08-14 DIAGNOSIS — M62.89 PELVIC FLOOR TENSION: ICD-10-CM

## 2018-08-14 PROCEDURE — 97535 SELF CARE MNGMENT TRAINING: CPT

## 2018-08-14 PROCEDURE — 97140 MANUAL THERAPY 1/> REGIONS: CPT

## 2018-08-14 NOTE — TELEPHONE ENCOUNTER
Spoke to pt who inquired about how she can go about finding another physician. She stated that she has been referred to several different physicians for different things and no one is helping her.  She also stated that she is getting solicitation phone call

## 2018-08-14 NOTE — PROGRESS NOTES
Dx: Pelvic floor tension (N34.3)          Authorized # of Visits/Insurance:  Medicaid  Script Dates: 8/7/18 - 10/2/18           Next MD visit: none scheduled  Referring MD: Corinne Bautista  Fall Risk:  standard         Precautions: n/a           Medication C possible onset of urge for BM. Plan: Continue to focus on pelvic floor down training/relaxation, BM retraining, with progression towards lumbopelvic strengthening as tolerated.      Charges: 2 Man, 1 SC       Total Timed Treatment: 40 min  Total Treatme

## 2018-08-14 NOTE — TELEPHONE ENCOUNTER
Pt called stating she has received several phone calls soliciting to her a mental health Dr. And requesting for her to make a appointment asap . Pt states they are pressuring her into seeing a mental health DrJeremiah Mcghee she does not need.  Pt was very nasty ov

## 2018-08-14 NOTE — TELEPHONE ENCOUNTER
----- Message from Mireille Shabazz PT sent at 8/2/2018  9:05 AM CDT -----  Regarding: referral behavioral health   Hi Dr. Sean Saxena,    I saw the above patient for her physical therapy evaluation on 8/1/18.  I wanted to inform you that the patient was very

## 2018-08-15 ENCOUNTER — APPOINTMENT (OUTPATIENT)
Dept: PHYSICAL THERAPY | Facility: HOSPITAL | Age: 28
End: 2018-08-15
Attending: FAMILY MEDICINE
Payer: MEDICAID

## 2018-08-15 ENCOUNTER — TELEPHONE (OUTPATIENT)
Dept: OBGYN CLINIC | Facility: CLINIC | Age: 28
End: 2018-08-15

## 2018-08-15 NOTE — TELEPHONE ENCOUNTER
Pt needs to be informed that Brina Watkins left her a message.   Pt needs to be informed that MAF can not see her today and she needs to schedule with another MD.

## 2018-08-16 LAB — NUCLEAR IGG TITR SER IF: NEGATIVE {TITER}

## 2018-08-17 ENCOUNTER — OFFICE VISIT (OUTPATIENT)
Dept: PHYSICAL THERAPY | Facility: HOSPITAL | Age: 28
End: 2018-08-17
Attending: FAMILY MEDICINE
Payer: MEDICAID

## 2018-08-17 LAB
ALBUMIN SERPL ELPH-MCNC: 3.94 G/DL (ref 3.75–5.21)
ALBUMIN/GLOB SERPL: 1.54 {RATIO} (ref 1–2)
ALPHA1 GLOB SERPL ELPH-MCNC: 0.23 G/DL (ref 0.19–0.46)
ALPHA2 GLOB SERPL ELPH-MCNC: 0.55 G/DL (ref 0.48–1.05)
B-GLOBULIN SERPL ELPH-MCNC: 0.68 G/DL (ref 0.68–1.23)
GAMMA GLOB SERPL ELPH-MCNC: 1.09 G/DL (ref 0.62–1.7)
TOTAL PROTEIN (SPECIAL TESTING): 6.5 G/DL (ref 6.5–9.1)

## 2018-08-17 PROCEDURE — 97140 MANUAL THERAPY 1/> REGIONS: CPT

## 2018-08-17 PROCEDURE — 97110 THERAPEUTIC EXERCISES: CPT

## 2018-08-17 NOTE — PROGRESS NOTES
Diagnosis: Chronic bilateral low back pain without sciatica (M54.5,G89.29)  Neck pain (M54.2)         Authorized # of Visits: NA  Insurance: Medicaid           Next MD visit: (post therapy)    Fall Risk: standard         Precautions:  (migraine; Immunocomp postural endurance.  Given RTB for HEP      * Pt informed that POC will change if she changes insurance to Jonnie Lamar as this clinic does not accept         Goals:  1) The patient will be safe and independent with a progressive HEP necessary to mange sympto

## 2018-08-21 ENCOUNTER — OFFICE VISIT (OUTPATIENT)
Dept: PHYSICAL THERAPY | Facility: HOSPITAL | Age: 28
End: 2018-08-21
Attending: CLINICAL NURSE SPECIALIST
Payer: MEDICAID

## 2018-08-21 ENCOUNTER — TELEPHONE (OUTPATIENT)
Dept: PHYSICAL THERAPY | Facility: HOSPITAL | Age: 28
End: 2018-08-21

## 2018-08-21 DIAGNOSIS — M62.89 PELVIC FLOOR TENSION: ICD-10-CM

## 2018-08-21 PROCEDURE — 97140 MANUAL THERAPY 1/> REGIONS: CPT

## 2018-08-21 PROCEDURE — 97535 SELF CARE MNGMENT TRAINING: CPT

## 2018-08-21 NOTE — PROGRESS NOTES
Dx: Pelvic floor tension (N34.3)          Authorized # of Visits/Insurance:  Medicaid  Script Dates: 8/7/18 - 10/2/18           Next MD visit: none scheduled  Referring MD: Zack Nicole  Fall Risk:  standard         Precautions: n/a           Medication C below        Assessment: Spent beginning of session discussing/addressing patient's frustrations/concerns with care at this hospital and alternative treatments she has sought out (i.e. Chiropractor/ortho MD).  Patient was visibly anxious/irritable initially

## 2018-08-22 ENCOUNTER — APPOINTMENT (OUTPATIENT)
Dept: PHYSICAL THERAPY | Facility: HOSPITAL | Age: 28
End: 2018-08-22
Attending: FAMILY MEDICINE
Payer: MEDICAID

## 2018-08-24 ENCOUNTER — OFFICE VISIT (OUTPATIENT)
Dept: PHYSICAL THERAPY | Facility: HOSPITAL | Age: 28
End: 2018-08-24
Attending: FAMILY MEDICINE
Payer: MEDICAID

## 2018-08-24 PROCEDURE — 97140 MANUAL THERAPY 1/> REGIONS: CPT

## 2018-08-24 PROCEDURE — 97110 THERAPEUTIC EXERCISES: CPT

## 2018-08-24 NOTE — PROGRESS NOTES
Diagnosis: Chronic bilateral low back pain without sciatica (M54.5,G89.29)  Neck pain (M54.2)         Authorized # of Visits: NA  Insurance: Medicaid           Next MD visit: (post therapy)    Fall Risk: standard         Precautions:  (migraine; Immunocomp rotation necessry to safely check blindspots while driving       4) The patient will increase walking tolerance with the ability to walk > 10 minutes at 3.0 mph necessary to participate in community events and return to the work force        Plan:   Mary Chaidez

## 2018-08-28 ENCOUNTER — OFFICE VISIT (OUTPATIENT)
Dept: PHYSICAL THERAPY | Facility: HOSPITAL | Age: 28
End: 2018-08-28
Attending: CLINICAL NURSE SPECIALIST
Payer: MEDICAID

## 2018-08-28 DIAGNOSIS — M62.89 PELVIC FLOOR TENSION: ICD-10-CM

## 2018-08-28 PROCEDURE — 97140 MANUAL THERAPY 1/> REGIONS: CPT

## 2018-08-28 NOTE — PROGRESS NOTES
Physical Therapy Discharge Summary  Reporting Period: 8/7 to 8/28  Dx: Pelvic floor tension (N34.3)          Authorized # of Visits/Insurance:  Medicaid  Script Dates: 8/7/18 - 10/2/18           Next MD visit: none scheduled  Referring MD: Vidal Gallardo perineal are and gluteal musculature. Notable release achieved and pt reported reduction in pain to mild-moderate at end of session. Reviewed HEP and discussed long term maintenance. Pt verbalized understanding.  Due to patient changing insurance to [employer]

## 2018-08-31 ENCOUNTER — OFFICE VISIT (OUTPATIENT)
Dept: PHYSICAL THERAPY | Facility: HOSPITAL | Age: 28
End: 2018-08-31
Attending: FAMILY MEDICINE
Payer: MEDICAID

## 2018-08-31 PROCEDURE — 97140 MANUAL THERAPY 1/> REGIONS: CPT

## 2018-08-31 NOTE — PROGRESS NOTES
Discharge Summary      Diagnosis: Chronic bilateral low back pain without sciatica (M54.5,G89.29)  Neck pain (M54.2)         Authorized # of Visits: NA  Insurance: Medicaid           Next MD visit: (post therapy)    Fall Risk: standard         Precautions: declined. She is progressing towards 50% of her LTGs.  The patient will be self-discharged as she changed her insurance, of which the hospital does not accept.         Goals:  1) The patient will be safe and independent with a progressive HEP necessary to m

## 2018-09-04 ENCOUNTER — APPOINTMENT (OUTPATIENT)
Dept: PHYSICAL THERAPY | Facility: HOSPITAL | Age: 28
End: 2018-09-04
Attending: CLINICAL NURSE SPECIALIST

## 2018-09-05 ENCOUNTER — APPOINTMENT (OUTPATIENT)
Dept: PHYSICAL THERAPY | Facility: HOSPITAL | Age: 28
End: 2018-09-05
Attending: FAMILY MEDICINE

## 2018-09-11 ENCOUNTER — APPOINTMENT (OUTPATIENT)
Dept: PHYSICAL THERAPY | Facility: HOSPITAL | Age: 28
End: 2018-09-11
Attending: CLINICAL NURSE SPECIALIST

## 2018-09-14 ENCOUNTER — APPOINTMENT (OUTPATIENT)
Dept: PHYSICAL THERAPY | Facility: HOSPITAL | Age: 28
End: 2018-09-14
Attending: FAMILY MEDICINE

## 2018-09-18 ENCOUNTER — APPOINTMENT (OUTPATIENT)
Dept: PHYSICAL THERAPY | Facility: HOSPITAL | Age: 28
End: 2018-09-18
Attending: CLINICAL NURSE SPECIALIST

## 2018-09-19 ENCOUNTER — APPOINTMENT (OUTPATIENT)
Dept: PHYSICAL THERAPY | Facility: HOSPITAL | Age: 28
End: 2018-09-19
Attending: FAMILY MEDICINE

## 2018-09-25 ENCOUNTER — APPOINTMENT (OUTPATIENT)
Dept: PHYSICAL THERAPY | Facility: HOSPITAL | Age: 28
End: 2018-09-25
Attending: CLINICAL NURSE SPECIALIST

## 2018-09-28 ENCOUNTER — TELEPHONE (OUTPATIENT)
Dept: FAMILY MEDICINE CLINIC | Facility: CLINIC | Age: 28
End: 2018-09-28

## 2018-09-28 ENCOUNTER — TELEPHONE (OUTPATIENT)
Dept: OBGYN CLINIC | Facility: CLINIC | Age: 28
End: 2018-09-28

## 2018-09-28 ENCOUNTER — APPOINTMENT (OUTPATIENT)
Dept: PHYSICAL THERAPY | Facility: HOSPITAL | Age: 28
End: 2018-09-28
Attending: FAMILY MEDICINE

## 2018-09-28 NOTE — TELEPHONE ENCOUNTER
Let pt know that her msg was sent to Dr. Fay Tatum to refill. Per pt, she will have to cx her appt as her ins co changed to [employer] and she will have to start seeing a new MD. Appt cx.

## 2018-09-28 NOTE — TELEPHONE ENCOUNTER
PER PT REQUESTING AN REFILL ON MEDICATION FOR YEAST INFECTION / PLS SEND SCRIPT  TO [facility] [place] [address] [address] PHONE # [phone] /  PHYLICIA ADV

## 2018-09-28 NOTE — TELEPHONE ENCOUNTER
Informed pt of [facility]. Offered an appt for the pt and she declined. She states that she is in between insurance and she can not see us any more and she can not see any other MD at the present d/t insurance. She states that she has no money. Pt was not happy that [address] [place] [address] would not prescribe a medication for her yeast.  \"She stated to ask him if he ever had a yeast infection\". Pt stated to have a good day and conversation ended. Sent to [address] [place] [address] and Cori Carbone as a FYI.

## 2018-09-28 NOTE — TELEPHONE ENCOUNTER
Pt calling stating that she has an yeast infection. She states that she gets them a lot. Pt has vaginal itching, discharge like cottage cheese, an odor and discomfort. She states she has treated her self with Monistat and needs something else. Pt will like a medication sent to pharmacy. Pt saw MAF on 7/23/18 and a genital vag was done and she was positive for 3+ Candida Albicans. Sent to [address] [place] [address], because [facility] is out of the office.

## 2018-10-10 ENCOUNTER — APPOINTMENT (OUTPATIENT)
Dept: GENERAL RADIOLOGY | Facility: HOSPITAL | Age: 28
End: 2018-10-10
Attending: NURSE PRACTITIONER
Payer: COMMERCIAL

## 2018-10-10 ENCOUNTER — HOSPITAL ENCOUNTER (EMERGENCY)
Facility: HOSPITAL | Age: 28
Discharge: HOME | End: 2018-10-10
Payer: COMMERCIAL

## 2018-10-10 VITALS
OXYGEN SATURATION: 100 % | SYSTOLIC BLOOD PRESSURE: 121 MMHG | HEIGHT: 66 IN | DIASTOLIC BLOOD PRESSURE: 84 MMHG | HEART RATE: 80 BPM | TEMPERATURE: 98 F | BODY MASS INDEX: 23.14 KG/M2 | RESPIRATION RATE: 11 BRPM | WEIGHT: 144 LBS

## 2018-10-10 DIAGNOSIS — K59.00 CONSTIPATION, UNSPECIFIED CONSTIPATION TYPE: ICD-10-CM

## 2018-10-10 DIAGNOSIS — R55 VASOVAGAL NEAR SYNCOPE: Primary | ICD-10-CM

## 2018-10-10 PROCEDURE — 80048 BASIC METABOLIC PNL TOTAL CA: CPT | Performed by: NURSE PRACTITIONER

## 2018-10-10 PROCEDURE — 93005 ELECTROCARDIOGRAM TRACING: CPT

## 2018-10-10 PROCEDURE — 85025 COMPLETE CBC W/AUTO DIFF WBC: CPT | Performed by: NURSE PRACTITIONER

## 2018-10-10 PROCEDURE — 85379 FIBRIN DEGRADATION QUANT: CPT | Performed by: NURSE PRACTITIONER

## 2018-10-10 PROCEDURE — 96372 THER/PROPH/DIAG INJ SC/IM: CPT

## 2018-10-10 PROCEDURE — 96360 HYDRATION IV INFUSION INIT: CPT

## 2018-10-10 PROCEDURE — 93010 ELECTROCARDIOGRAM REPORT: CPT | Performed by: NURSE PRACTITIONER

## 2018-10-10 PROCEDURE — 74019 RADEX ABDOMEN 2 VIEWS: CPT | Performed by: NURSE PRACTITIONER

## 2018-10-10 PROCEDURE — 81001 URINALYSIS AUTO W/SCOPE: CPT

## 2018-10-10 PROCEDURE — 99285 EMERGENCY DEPT VISIT HI MDM: CPT

## 2018-10-10 PROCEDURE — 96361 HYDRATE IV INFUSION ADD-ON: CPT

## 2018-10-10 PROCEDURE — 81025 URINE PREGNANCY TEST: CPT

## 2018-10-10 RX ORDER — DICYCLOMINE HYDROCHLORIDE 10 MG/ML
20 INJECTION INTRAMUSCULAR ONCE
Status: COMPLETED | OUTPATIENT
Start: 2018-10-10 | End: 2018-10-10

## 2018-10-10 NOTE — ED PROVIDER NOTES
Patient Seen in: [facility] AND [facility] Emergency Department    History   CC: near syncope  HPI: Joselyn Torres 25year old female  who presents to the ER c/o near syncopal episode today during hot yoga.   Patient states she was in a deep stretch and began t vital signs reviewed. All other systems reviewed and negative except as noted above. PSFH elements reviewed from today and agreed except as otherwise stated in HPI. Medications :   MISC NATURAL PRODUCT OP,  Apply to eye.  Idania Jameson without difficulty   Psych - Interactive and appropriate      ED Course     Labs Reviewed   URINALYSIS WITH CULTURE REFLEX - Abnormal; Notable for the following components:       Result Value    Clarity Urine Hazy (*)     Ketones Urine Trace (*)     Blood prone, and upright (or decubitus) radiographs of the abdomen were performed.       FINDINGS:   BOWEL GAS PATTERN: Normal bowel gas pattern.    FREE AIR:   None.    SOFT TISSUES: Normal. No masses or organomegaly.   CALCIFICATIONS: None significant.    BONES

## 2018-10-10 NOTE — ED INITIAL ASSESSMENT (HPI)
C/o near syncopal episodes last night and today. Denies vomiting or fevers. Pt states she has been constipated for 1 week, passing very small amounts of very hard stool and having LLQ abdominal cramping + spasms.  Pt found blood in the toilet, unsure if it

## 2019-02-18 NOTE — LETTER
No referring provider defined for this encounter. 10/12/22        Patient: Valentin Morrison   YOB: 1990   Date of Visit: 10/11/2022       Dear  Dr. Yane Rey, DO,      Thank you for referring Valentin Morrison to my practice. Please find my assessment and plan below. As you know she is a 68-year-old female with a history of asthma, chronic urticaria, anxiety, ADHD, irritable bowel syndrome, chronic low back pain who I now had the pleasure of seeing for evaluation of possible renal colic. The patient states that she is pretty certain that she has passed at least 2 or 3 stones spontaneously. She noted something in the toilet bowl but never retrieve these for stone analysis. More recently she went to the emergency room on September 20, 2022 and again on September 25, 2022 with bilateral flank pain. She had CT scans done on both dates and no clear-cut renal calculi or hydronephrosis was identified. The ER physician told her that she might of passed the stone. Patient though continues to have lower abdominal pain and left flank pain on a daily basis. Symptoms seem to wax and wane in intensity but never really go away. She just saw her gynecologist and she has been treated for vaginosis. She was also having urinary tract symptoms with frequency urgency and dysuria. However urine culture on October 3 was negative. Again continues to have those symptoms. Her past medical history as stated above. Medications are as listed. Gets intermittent epidural injections for chronic lower back pain but she really feels her current symptoms are different than her lumbar pain. Social history she is a non-smoker. Family history is positive for frequent urinary tract infections and kidney stones. Review of system patient was states she is doing well without any chest pain, shortness of breath, or GI symptoms.   Again continues to have these intermittent episodes of frequency urgency and dysuria. I reviewed this CT scan statin in September with the patient. I told her there is no obvious pathology that would explain her ongoing lower abdominal and left flank pain. If she actually did pass a stone spontaneously those symptoms should have resolved. I also reassured her that her renal function on both those dates were normal.  As she  continues to have urinary tract symptoms with a negative urine culture recommended that she see urology for further work-up and evaluation. She has an appoint with them on November 9. Otherwise reinforced the importance of drinking plenty of fluids. Strongly advised that if she thinks she is passing a stone spontaneously she should retrieve it and bring it in for stone analysis. I do not need to see her for follow-up unless you feel it is clinically indicated. Thank you very much for allowing me to participate in the care of your patient. If you have any questions please feel free to call.            Sincerely,   Indiana Ortega MD   [address]  [place] [address] [place] [address]  [address] [facility] [id number]    Document electronically generated by:  Indiana Ortega MD

## 2019-09-27 ENCOUNTER — HOSPITAL ENCOUNTER (EMERGENCY)
Facility: HOSPITAL | Age: 29
Discharge: HOME | End: 2019-09-27
Attending: EMERGENCY MEDICINE
Payer: MEDICAID

## 2019-09-27 ENCOUNTER — APPOINTMENT (OUTPATIENT)
Dept: CT IMAGING | Facility: HOSPITAL | Age: 29
End: 2019-09-27
Attending: EMERGENCY MEDICINE
Payer: MEDICAID

## 2019-09-27 VITALS
HEART RATE: 77 BPM | DIASTOLIC BLOOD PRESSURE: 74 MMHG | HEIGHT: 67 IN | RESPIRATION RATE: 14 BRPM | SYSTOLIC BLOOD PRESSURE: 109 MMHG | TEMPERATURE: 98 F | OXYGEN SATURATION: 99 % | WEIGHT: 160 LBS | BODY MASS INDEX: 25.11 KG/M2

## 2019-09-27 DIAGNOSIS — R55 SYNCOPE AND COLLAPSE: Primary | ICD-10-CM

## 2019-09-27 DIAGNOSIS — S00.03XA CONTUSION OF SCALP, INITIAL ENCOUNTER: ICD-10-CM

## 2019-09-27 PROCEDURE — 93005 ELECTROCARDIOGRAM TRACING: CPT

## 2019-09-27 PROCEDURE — 99284 EMERGENCY DEPT VISIT MOD MDM: CPT

## 2019-09-27 PROCEDURE — 80048 BASIC METABOLIC PNL TOTAL CA: CPT | Performed by: EMERGENCY MEDICINE

## 2019-09-27 PROCEDURE — 87086 URINE CULTURE/COLONY COUNT: CPT | Performed by: EMERGENCY MEDICINE

## 2019-09-27 PROCEDURE — 80048 BASIC METABOLIC PNL TOTAL CA: CPT

## 2019-09-27 PROCEDURE — 85025 COMPLETE CBC W/AUTO DIFF WBC: CPT | Performed by: EMERGENCY MEDICINE

## 2019-09-27 PROCEDURE — 81025 URINE PREGNANCY TEST: CPT

## 2019-09-27 PROCEDURE — 84484 ASSAY OF TROPONIN QUANT: CPT | Performed by: EMERGENCY MEDICINE

## 2019-09-27 PROCEDURE — 96360 HYDRATION IV INFUSION INIT: CPT

## 2019-09-27 PROCEDURE — 84484 ASSAY OF TROPONIN QUANT: CPT

## 2019-09-27 PROCEDURE — 85025 COMPLETE CBC W/AUTO DIFF WBC: CPT

## 2019-09-27 PROCEDURE — 93010 ELECTROCARDIOGRAM REPORT: CPT | Performed by: EMERGENCY MEDICINE

## 2019-09-27 PROCEDURE — 81001 URINALYSIS AUTO W/SCOPE: CPT | Performed by: EMERGENCY MEDICINE

## 2019-09-27 PROCEDURE — 70450 CT HEAD/BRAIN W/O DYE: CPT | Performed by: EMERGENCY MEDICINE

## 2019-09-27 RX ORDER — ACETAMINOPHEN 500 MG
1000 TABLET ORAL ONCE
Status: COMPLETED | OUTPATIENT
Start: 2019-09-27 | End: 2019-09-27

## 2019-09-27 NOTE — ED NOTES
Care assumed from [facility]. Patient resting on cart, in no apparent distress. Will continue to monitor.

## 2019-09-27 NOTE — ED INITIAL ASSESSMENT (HPI)
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 Pt states she hit her head and L hip.

## 2019-09-27 NOTE — ED PROVIDER NOTES
Patient Seen in: [facility] AND [facility] Emergency Department    History   Patient presents with:  Syncope (cardiovascular, neurologic)    Stated Complaint: Syncopal episode     HPI    Patient presents with passing out again.   She states she is done this a cou (XYZAL) 5 MG Oral Tab,  Take 1 tablet (5 mg total) by mouth nightly. Albuterol Sulfate HFA (PROAIR HFA) 108 (90 Base) MCG/ACT Inhalation Aero Soln,  Inhale 2 puffs into the lungs. Montelukast Sodium (SINGULAIR) 10 MG Oral Tab,  Take 10 mg by mouth.    A No cranial nerve asymmetry noted. Pupils are equal reactive extract movements are intact  Psychiatric:  Normal affect. Oriented. No unusual behavior. Interacting well.     We will do IV fluids orthostatics blood work urinalysis urine pregnancy CT scan ROUTINE   CBC W/ DIFFERENTIAL     EKG    Rate, intervals and axes as noted on EKG Report.   Rate: EKG shows rate 81 normal sinus rhythm no ST elevation or depression noted              MDM     100% Normal  Pulse oximetry    Cardiac Monitor: Normal sinus rhy

## 2019-10-02 ENCOUNTER — TELEPHONE (OUTPATIENT)
Dept: CARDIOLOGY CLINIC | Facility: CLINIC | Age: 29
End: 2019-10-02

## 2019-10-02 ENCOUNTER — OFFICE VISIT (OUTPATIENT)
Dept: FAMILY MEDICINE CLINIC | Facility: CLINIC | Age: 29
End: 2019-10-02
Payer: MEDICAID

## 2019-10-02 VITALS
DIASTOLIC BLOOD PRESSURE: 88 MMHG | HEART RATE: 85 BPM | BODY MASS INDEX: 24.01 KG/M2 | OXYGEN SATURATION: 95 % | WEIGHT: 153 LBS | HEIGHT: 67 IN | SYSTOLIC BLOOD PRESSURE: 130 MMHG

## 2019-10-02 DIAGNOSIS — R55 SYNCOPE, UNSPECIFIED SYNCOPE TYPE: Primary | ICD-10-CM

## 2019-10-02 PROCEDURE — 99213 OFFICE O/P EST LOW 20 MIN: CPT | Performed by: FAMILY MEDICINE

## 2019-10-02 RX ORDER — CLONAZEPAM 0.5 MG/1
TABLET ORAL
Refills: 0 | COMMUNITY
Start: 2019-06-04 | End: 2020-03-16

## 2019-10-02 RX ORDER — SODIUM CHLORIDE/SODIUM BICARB
1 PACKET (EA) NASAL
COMMUNITY
Start: 2017-12-27 | End: 2020-03-03

## 2019-10-02 RX ORDER — LEVONORGESTREL AND ETHINYL ESTRADIOL 0.1-0.02MG
KIT ORAL
Refills: 0 | COMMUNITY
Start: 2019-10-01 | End: 2019-11-21

## 2019-10-02 RX ORDER — ALBUTEROL SULFATE 90 UG/1
AEROSOL, METERED RESPIRATORY (INHALATION)
COMMUNITY
Start: 2018-11-13 | End: 2020-03-16

## 2019-10-02 NOTE — PROGRESS NOTES
HPI:    Patient ID: Monae Walker is a 30year old female who presents for ED f/u for syncope. HPI  Has had two episodes of syncope in total.  First episode was summer 2018 and was told she had a block in intestines (constipated).  Had a really bad mi • ADD (attention deficit disorder)    • Anxiety     not a current a issue   • Depression     not an issue   • Immunocompromised ([address]    • Migraine headache     [facility]   • UTI (urinary tract infection)         Current Outpatient Medications: Negative for pallor and rash. Neurological: Positive for dizziness, syncope, light-headedness and headaches. Negative for tremors, seizures, facial asymmetry, weakness and numbness.       10/02/19  7813 10/02/19  0852 10/02/19  0854 10/02/19  0856   BP: 1 Coordination and gait normal.   Skin: Skin is warm and dry. No rash noted. No pallor. Psychiatric: Judgment normal. Her mood appears anxious and depressed. Tearful at time during visit, particularly when talking about possible move to [place].

## 2019-10-02 NOTE — TELEPHONE ENCOUNTER
Hi Dr. Hernán Edwards,    An order for a 30 day event monitor for this patient generated in my in-basket. But when I checked to see the order in the chart, I see it was cancelled.     Does this patient require a 30 day event monitor? (Monitor is placed in our clin

## 2019-10-07 ENCOUNTER — TELEPHONE (OUTPATIENT)
Dept: CARDIOLOGY CLINIC | Facility: CLINIC | Age: 29
End: 2019-10-07

## 2019-10-07 NOTE — TELEPHONE ENCOUNTER
S/w pt and she states she called to get a event monitor . I can't find an order. She relayed that Kenrick Ruiz wanted it. informed her I will check with him.     S/w Dr. Kate Carter, as happened to be at same office,  confirmed that he wants pt to see cardiologist

## 2019-10-07 NOTE — TELEPHONE ENCOUNTER
Patient calling to set up 30 day event monitor as soon as possible patient going away, Tuesday night. Please call at:[phone],thanks.

## 2019-10-14 ENCOUNTER — OFFICE VISIT (OUTPATIENT)
Dept: CARDIOLOGY CLINIC | Facility: CLINIC | Age: 29
End: 2019-10-14
Payer: MEDICAID

## 2019-10-14 VITALS
RESPIRATION RATE: 15 BRPM | HEART RATE: 60 BPM | WEIGHT: 152 LBS | DIASTOLIC BLOOD PRESSURE: 84 MMHG | SYSTOLIC BLOOD PRESSURE: 121 MMHG | HEIGHT: 67 IN | BODY MASS INDEX: 23.86 KG/M2

## 2019-10-14 DIAGNOSIS — R55 VASOVAGAL SYNCOPE: ICD-10-CM

## 2019-10-14 DIAGNOSIS — I95.1 ORTHOSTATIC HYPOTENSION: ICD-10-CM

## 2019-10-14 DIAGNOSIS — R55 VASOVAGAL SYNDROME: Primary | ICD-10-CM

## 2019-10-14 PROCEDURE — 99245 OFF/OP CONSLTJ NEW/EST HI 55: CPT | Performed by: INTERNAL MEDICINE

## 2019-10-14 NOTE — H&P
[facility]    Cardiac Electrophysiology Consultation  10/14/2019    Name:  Lambert Archibald  : 1990    Date of consultation:   10/14/2019    Referring physician: Dr. Kenyon Chou    Reason for Consultation:  syncope    History of Present Illness: smoking. She has never used smokeless tobacco. She reports current alcohol use. She reports current drug use. Drug: Cannabis.     Allergies:    Sertraline              HIVES  Sumatriptan             PALPITATIONS  Amitriptyline           UNKNOWN  Lexapro [Es headaches, focal weaknesses or paresthesias  All other systems reviewed and negative    Physical Exam:  Vital Signs: /84 (BP Location: Left arm, Patient Position: Sitting, Cuff Size: adult)   Pulse 60   Resp 15   Ht 67\"   Wt 152 lb (68.9 kg)   LMP 08/13/2018     09/27/2019    K 3.7 09/27/2019     09/27/2019    CO2 24.0 09/27/2019     Lab Results   Component Value Date    TSH 1.28 07/23/2018       IMPRESSIONS:  1.  Vasovagal syndrome  (primary encounter diagnosis)  - symptoms of dizziness

## 2019-10-14 NOTE — PATIENT INSTRUCTIONS
-Echocardiogram  -30-day event monitor  -Increase fluid intake to 2-1/2 or more liters of non-caffeinated nonalcoholic beverages daily.  -Add dietary salt 2000 mg daily, or sodium chloride 1 g tablet 3 times a day.  -Wear waist high compression stockings g

## 2019-10-15 ENCOUNTER — NURSE ONLY (OUTPATIENT)
Dept: CARDIOLOGY CLINIC | Facility: CLINIC | Age: 29
End: 2019-10-15
Payer: MEDICAID

## 2019-10-15 DIAGNOSIS — I95.1 ORTHOSTATIC HYPOTENSION: ICD-10-CM

## 2019-10-15 DIAGNOSIS — R55 VASOVAGAL SYNDROME: ICD-10-CM

## 2019-10-15 DIAGNOSIS — R55 VASOVAGAL SYNCOPE: ICD-10-CM

## 2019-10-15 PROCEDURE — 93270 REMOTE 30 DAY ECG REV/REPORT: CPT | Performed by: INTERNAL MEDICINE

## 2019-10-16 ENCOUNTER — TELEPHONE (OUTPATIENT)
Dept: CARDIOLOGY CLINIC | Facility: CLINIC | Age: 29
End: 2019-10-16

## 2019-10-16 NOTE — TELEPHONE ENCOUNTER
SCHEDULED FOR 10/22/19. CARD ECHO 2D DOPPLER (CPT=93306) [[id number]]  Order #: [id number].  #:[id number] FUTURE   Priority: Routine  Class: EHV - No RFL   Resulting Agency: [facility]  Test ID: [id number]  Future Order Information    Expires on:

## 2019-10-22 ENCOUNTER — HOSPITAL ENCOUNTER (OUTPATIENT)
Dept: CV DIAGNOSTICS | Age: 29
Discharge: HOME | End: 2019-10-22
Attending: INTERNAL MEDICINE
Payer: MEDICAID

## 2019-10-22 DIAGNOSIS — I95.1 ORTHOSTATIC HYPOTENSION: ICD-10-CM

## 2019-10-22 DIAGNOSIS — R55 VASOVAGAL SYNCOPE: ICD-10-CM

## 2019-10-22 PROCEDURE — 93306 TTE W/DOPPLER COMPLETE: CPT | Performed by: INTERNAL MEDICINE

## 2019-11-18 ENCOUNTER — APPOINTMENT (OUTPATIENT)
Dept: CARDIOLOGY CLINIC | Facility: CLINIC | Age: 29
End: 2019-11-18
Payer: MEDICAID

## 2019-11-18 DIAGNOSIS — R55 VASOVAGAL SYNDROME: ICD-10-CM

## 2019-11-18 DIAGNOSIS — I95.1 ORTHOSTATIC HYPOTENSION: ICD-10-CM

## 2019-11-18 DIAGNOSIS — R55 VASOVAGAL SYNCOPE: ICD-10-CM

## 2019-11-21 ENCOUNTER — OFFICE VISIT (OUTPATIENT)
Dept: CARDIOLOGY CLINIC | Facility: CLINIC | Age: 29
End: 2019-11-21
Payer: MEDICAID

## 2019-11-21 ENCOUNTER — OFFICE VISIT (OUTPATIENT)
Dept: FAMILY MEDICINE CLINIC | Facility: CLINIC | Age: 29
End: 2019-11-21
Payer: MEDICAID

## 2019-11-21 VITALS
SYSTOLIC BLOOD PRESSURE: 118 MMHG | TEMPERATURE: 99 F | BODY MASS INDEX: 23.86 KG/M2 | WEIGHT: 152 LBS | OXYGEN SATURATION: 99 % | HEART RATE: 77 BPM | HEIGHT: 67 IN | DIASTOLIC BLOOD PRESSURE: 70 MMHG

## 2019-11-21 VITALS
BODY MASS INDEX: 23.86 KG/M2 | HEIGHT: 67 IN | RESPIRATION RATE: 16 BRPM | SYSTOLIC BLOOD PRESSURE: 117 MMHG | WEIGHT: 152 LBS | HEART RATE: 76 BPM | DIASTOLIC BLOOD PRESSURE: 75 MMHG

## 2019-11-21 DIAGNOSIS — G89.29 CHRONIC PAIN OF RIGHT WRIST: ICD-10-CM

## 2019-11-21 DIAGNOSIS — Z30.8 ENCOUNTER FOR OTHER CONTRACEPTIVE MANAGEMENT: ICD-10-CM

## 2019-11-21 DIAGNOSIS — M25.531 CHRONIC PAIN OF RIGHT WRIST: ICD-10-CM

## 2019-11-21 DIAGNOSIS — R30.0 DYSURIA: ICD-10-CM

## 2019-11-21 DIAGNOSIS — B00.1 RECURRENT COLD SORES: ICD-10-CM

## 2019-11-21 DIAGNOSIS — R55 VASOVAGAL SYNDROME: Primary | ICD-10-CM

## 2019-11-21 DIAGNOSIS — G89.29 CHRONIC NECK PAIN: Primary | ICD-10-CM

## 2019-11-21 DIAGNOSIS — R20.0 ARM NUMBNESS: ICD-10-CM

## 2019-11-21 DIAGNOSIS — M54.2 CHRONIC NECK PAIN: Primary | ICD-10-CM

## 2019-11-21 DIAGNOSIS — F98.8 ATTENTION DEFICIT DISORDER, UNSPECIFIED HYPERACTIVITY PRESENCE: ICD-10-CM

## 2019-11-21 DIAGNOSIS — F41.9 ANXIETY: ICD-10-CM

## 2019-11-21 DIAGNOSIS — M54.31 SCIATICA OF RIGHT SIDE: ICD-10-CM

## 2019-11-21 PROBLEM — K21.9 GASTROESOPHAGEAL REFLUX DISEASE: Status: RESOLVED | Noted: 2018-05-29 | Resolved: 2019-11-21

## 2019-11-21 PROBLEM — K52.9 CHRONIC DIARRHEA: Status: RESOLVED | Noted: 2018-05-29 | Resolved: 2019-11-21

## 2019-11-21 PROCEDURE — 99213 OFFICE O/P EST LOW 20 MIN: CPT | Performed by: INTERNAL MEDICINE

## 2019-11-21 PROCEDURE — 99215 OFFICE O/P EST HI 40 MIN: CPT | Performed by: FAMILY MEDICINE

## 2019-11-21 RX ORDER — CYCLOBENZAPRINE HCL 10 MG
10 TABLET ORAL 3 TIMES DAILY PRN
COMMUNITY
End: 2019-11-21

## 2019-11-21 RX ORDER — DEXTROAMPHETAMINE SACCHARATE, AMPHETAMINE ASPARTATE, DEXTROAMPHETAMINE SULFATE AND AMPHETAMINE SULFATE 5; 5; 5; 5 MG/1; MG/1; MG/1; MG/1
20 TABLET ORAL AS NEEDED
Qty: 30 TABLET | Refills: 0 | Status: SHIPPED | OUTPATIENT
Start: 2019-11-21 | End: 2021-08-11

## 2019-11-21 RX ORDER — CYCLOBENZAPRINE HCL 5 MG
5 TABLET ORAL 3 TIMES DAILY PRN
Qty: 30 TABLET | Refills: 0 | Status: SHIPPED | OUTPATIENT
Start: 2019-11-21 | End: 2020-01-03

## 2019-11-21 RX ORDER — VALACYCLOVIR HYDROCHLORIDE 500 MG/1
500 TABLET, FILM COATED ORAL 2 TIMES DAILY
Qty: 30 TABLET | Refills: 0 | Status: SHIPPED | OUTPATIENT
Start: 2019-11-21 | End: 2020-09-21

## 2019-11-21 RX ORDER — VALACYCLOVIR HYDROCHLORIDE 500 MG/1
TABLET, FILM COATED ORAL
Refills: 0 | COMMUNITY
Start: 2019-10-28 | End: 2019-11-21

## 2019-11-21 RX ORDER — DEXTROAMPHETAMINE SACCHARATE, AMPHETAMINE ASPARTATE, DEXTROAMPHETAMINE SULFATE AND AMPHETAMINE SULFATE 5; 5; 5; 5 MG/1; MG/1; MG/1; MG/1
20 TABLET ORAL AS NEEDED
COMMUNITY
End: 2019-11-21

## 2019-11-21 NOTE — PROGRESS NOTES
HPI:    Patient ID: Monae Walker is a 30year old female who presents for multiple complaints and medication refills. HPI  Right wrist pain:   Wants PT referral.  Has had since May but not improving.    Was dx with tendinitis many years ago, but kenny well.   Was given ciprofloxacin 500mg BID x7 days and diflucan (2 doses). No vaginal symptoms. STD tests pending. Ucx were negative. Pt completed abx as prescribed. Pain has resolved. Took about 3 days of the abx.  Still has some suprapubic pressure (5 mg total) by mouth 3 (three) times daily as needed for Muscle spasms. 30 tablet 0   • valACYclovir HCl 500 MG Oral Tab Take 1 tablet (500 mg total) by mouth 2 (two) times daily for 7 days.  30 tablet 0   • clonazePAM 0.5 MG Oral Tab TAKE 1 TABLET PO TID She appears well-developed and well-nourished. She appears distressed (Intermittently tearful throughout exam.). HENT:   Head: Normocephalic and atraumatic.    Eyes: Conjunctivae and EOM are normal.   Cardiovascular: Normal rate, regular rhythm and normal has normal strength and normal reflexes. She displays no atrophy and no tremor. No sensory deficit. She exhibits normal muscle tone. Coordination and gait normal.   Skin: Skin is warm and dry.    Psychiatric: Judgment and thought content normal. Her mood ap refill of clonazepam, but will decline as she has a large supply with her today and uses once weekly. -[place].     8. Recurrent cold sores  -Refill of valtrex per request.     9. Encounter for other contraceptive management  -Recently started on pro

## 2019-11-21 NOTE — PATIENT INSTRUCTIONS
-Continue with the nonpharmacological treatments for vasovagal syndromes, as detailed at our last visit

## 2019-11-21 NOTE — PROGRESS NOTES
[facility]    Cardiac Electrophysiology Progress Note  11/21/2019      HPI:   Saba Mcdonald is a 30year old with a vasovagal syndrome who returns to discuss test results.   She does feel a slight improvement with the increased salt and fluid intake deficits.     LABS/DATA:     Lab Results   Component Value Date    WBC 8.9 09/27/2019    RBC 4.23 09/27/2019    HGB 13.1 09/27/2019    HCT 40.0 09/27/2019    MCV 94.6 09/27/2019    MCH 31.0 09/27/2019    MCHC 32.8 09/27/2019    RDW 12.8 09/27/2019    PLT 27

## 2019-11-22 PROBLEM — M54.31 SCIATICA OF RIGHT SIDE: Status: ACTIVE | Noted: 2019-11-22

## 2019-11-22 PROBLEM — G89.29 CHRONIC NECK PAIN: Status: ACTIVE | Noted: 2019-11-22

## 2019-11-22 PROBLEM — M25.531 CHRONIC PAIN OF RIGHT WRIST: Status: ACTIVE | Noted: 2019-11-22

## 2019-11-22 PROBLEM — M54.2 CHRONIC NECK PAIN: Status: ACTIVE | Noted: 2019-11-22

## 2019-11-22 PROBLEM — G89.29 CHRONIC PAIN OF RIGHT WRIST: Status: ACTIVE | Noted: 2019-11-22

## 2019-11-22 PROCEDURE — 93272 ECG/REVIEW INTERPRET ONLY: CPT | Performed by: INTERNAL MEDICINE

## 2019-11-22 RX ORDER — ACETAMINOPHEN AND CODEINE PHOSPHATE 120; 12 MG/5ML; MG/5ML
0.35 SOLUTION ORAL DAILY
Qty: 28 TABLET | Refills: 11 | Status: SHIPPED | OUTPATIENT
Start: 2019-11-22 | End: 2019-12-20

## 2019-12-02 DIAGNOSIS — M25.531 RIGHT WRIST PAIN: Primary | ICD-10-CM

## 2019-12-06 ENCOUNTER — MED REC SCAN ONLY (OUTPATIENT)
Dept: FAMILY MEDICINE CLINIC | Facility: CLINIC | Age: 29
End: 2019-12-06

## 2019-12-13 ENCOUNTER — MED REC SCAN ONLY (OUTPATIENT)
Dept: FAMILY MEDICINE CLINIC | Facility: CLINIC | Age: 29
End: 2019-12-13

## 2019-12-13 ENCOUNTER — OFFICE VISIT (OUTPATIENT)
Dept: FAMILY MEDICINE CLINIC | Facility: CLINIC | Age: 29
End: 2019-12-13
Payer: MEDICAID

## 2019-12-13 VITALS
WEIGHT: 151 LBS | HEART RATE: 98 BPM | OXYGEN SATURATION: 97 % | RESPIRATION RATE: 16 BRPM | BODY MASS INDEX: 23.7 KG/M2 | SYSTOLIC BLOOD PRESSURE: 120 MMHG | DIASTOLIC BLOOD PRESSURE: 80 MMHG | HEIGHT: 67 IN | TEMPERATURE: 99 F

## 2019-12-13 DIAGNOSIS — K04.7 CHRONIC DENTAL INFECTION: ICD-10-CM

## 2019-12-13 DIAGNOSIS — J06.9 VIRAL URI: Primary | ICD-10-CM

## 2019-12-13 PROCEDURE — 99213 OFFICE O/P EST LOW 20 MIN: CPT | Performed by: FAMILY MEDICINE

## 2019-12-13 RX ORDER — ALBUTEROL SULFATE 90 UG/1
AEROSOL, METERED RESPIRATORY (INHALATION)
COMMUNITY
Start: 2019-12-09 | End: 2020-01-07

## 2019-12-13 NOTE — PROGRESS NOTES
Patient presents with:  Fever  Cough  Body ache and/or chills: chronic infection in tooth      HPI:   Raven Jacobs is a 30year old female who presents for upper respiratory symptoms and tooth infection.      URI sx:  Headache, lymph node pain, cough, s daily for 28 days.  28 tablet 11      Past Medical History:   Diagnosis Date   • ADD (attention deficit disorder)    • Anxiety     not a current a issue   • Depression     not an issue   • Immunocompromised ([address]    • Migraine headache     [facility] encounter diagnosis)  Plan:   -Likely 2/2 viral URI. Discussed typical course of illness.  -Stressed importance of hydration.    -Tylenol and/or motrin prn for pain and fevers.  -Robitussin and/or mucinex prn for cough.  Or may continue dayquil.   -May tria

## 2019-12-17 ENCOUNTER — OFFICE VISIT (OUTPATIENT)
Dept: OCCUPATIONAL MEDICINE | Facility: HOSPITAL | Age: 29
End: 2019-12-17
Attending: FAMILY MEDICINE
Payer: MEDICAID

## 2019-12-17 DIAGNOSIS — M25.531 RIGHT WRIST PAIN: ICD-10-CM

## 2019-12-17 PROCEDURE — 97166 OT EVAL MOD COMPLEX 45 MIN: CPT | Performed by: OCCUPATIONAL THERAPIST

## 2019-12-17 PROCEDURE — 97530 THERAPEUTIC ACTIVITIES: CPT | Performed by: OCCUPATIONAL THERAPIST

## 2019-12-17 NOTE — PROGRESS NOTES
OCCUPATIONAL THERAPY UPPER EXTREMITY EVALUATION:   Referring Physician: Dr. Laureen Villarreal  Date of onset:April,2019  Diagnosis: Right wrist pain Date of Service: 12/17/19       PATIENT SUMMARY:   Jigna Velasquez is a 30year old y/o female who presents to therapy pain in packed position for wrist flexion/extension, UD/RD. She is positive for the following provocative test: resisted long finger test. She is negative for Scaphoid shift test and Ulna fovea palpation.  She has decreased right wrist AROM and decreased st 3 pt Pinch: 10       18         Lateral Pinch: 11       15             Today's Treatment: Patient education provided on diagnosis, POC and HEP.  Pt was instructed in and issued a HEP for: wrist AROM and isometric wrist HEP    Date 12/17/19 limitation: None  Rehab Potential:good     FOTO: 50/100    Patient was advised of these findings, precautions, and treatment options and has agreed to actively participate in planning and for this course of care.     Thank you for your referral. Please co-s

## 2019-12-19 ENCOUNTER — TELEPHONE (OUTPATIENT)
Dept: PHYSICAL THERAPY | Facility: HOSPITAL | Age: 29
End: 2019-12-19

## 2019-12-19 ENCOUNTER — OFFICE VISIT (OUTPATIENT)
Dept: PHYSICAL THERAPY | Facility: HOSPITAL | Age: 29
End: 2019-12-19
Attending: FAMILY MEDICINE
Payer: MEDICAID

## 2019-12-19 PROCEDURE — 97110 THERAPEUTIC EXERCISES: CPT

## 2019-12-19 PROCEDURE — 97163 PT EVAL HIGH COMPLEX 45 MIN: CPT

## 2019-12-19 NOTE — PROGRESS NOTES
SPINE EVALUATION:   Referring Physician: Dr. Kate Carter  Diagnosis: Chronic neck pain (M54.2,G89.29)  Sciatica of right side (M54.31)  Arm numbness (R20.0)  Chronic pain of right wrist (M25.531,G89.29) Date of Service: 12/19/2019   Date of Onset: chronic  I History of current condition: Patient with chronic neck pain since 5th grade when tripped and fell and hit head on concrete. Has had neck pain since that time. Patient has had reinjured neck in June 2018.    Had an injury to right wrist when in May 2018 li Manolo Grijalva describes prior level of function : has had limitations with sleep tolerance for years and limitations and has been unable to participate in hobbies such as running, rock climbing, painting due to arm and sciatica symptoms, decreased driving toleran Balance: SLS R 30+, L 30+ secs    Cervical AROM  degrees               Pain (+/-)   Flexion 34                     tight   Extension 42 neg   R Sidebend 34 neg   L Sidebend 32 neg   R Rotation 56 tight   L Rotation 65 Dizzy at end   Protrusion WNL    Retra Palpation: mild elevated tone both suboccipitals, right LS, and upper trap, tenderness right glut medius, right lumbar p/s      Special Tests: slump neg, SLR neg    Today’s Treatment and Response:  Patient education provided on prone knee flexion, chin ret Lumbar Initial FOTO: 48/100  Lumbar Predicted FOTO: 54/100  Cervical Initial FOTO: 61/100  Cervical Predicted FOTO: 61/100    Patient/ was advised of these findings, precautions, and treatment options and has agreed to actively participate in planning and

## 2019-12-20 ENCOUNTER — APPOINTMENT (OUTPATIENT)
Dept: OCCUPATIONAL MEDICINE | Facility: HOSPITAL | Age: 29
End: 2019-12-20
Attending: FAMILY MEDICINE
Payer: MEDICAID

## 2019-12-23 ENCOUNTER — APPOINTMENT (OUTPATIENT)
Dept: OCCUPATIONAL MEDICINE | Facility: HOSPITAL | Age: 29
End: 2019-12-23
Attending: FAMILY MEDICINE
Payer: MEDICAID

## 2019-12-23 ENCOUNTER — APPOINTMENT (OUTPATIENT)
Dept: PHYSICAL THERAPY | Facility: HOSPITAL | Age: 29
End: 2019-12-23
Attending: FAMILY MEDICINE
Payer: MEDICAID

## 2019-12-27 ENCOUNTER — APPOINTMENT (OUTPATIENT)
Dept: OCCUPATIONAL MEDICINE | Facility: HOSPITAL | Age: 29
End: 2019-12-27
Attending: FAMILY MEDICINE
Payer: MEDICAID

## 2019-12-30 ENCOUNTER — APPOINTMENT (OUTPATIENT)
Dept: OCCUPATIONAL MEDICINE | Facility: HOSPITAL | Age: 29
End: 2019-12-30
Attending: FAMILY MEDICINE
Payer: MEDICAID

## 2020-01-02 ENCOUNTER — OFFICE VISIT (OUTPATIENT)
Dept: FAMILY MEDICINE CLINIC | Facility: CLINIC | Age: 30
End: 2020-01-02
Payer: MEDICAID

## 2020-01-02 ENCOUNTER — OFFICE VISIT (OUTPATIENT)
Dept: OCCUPATIONAL MEDICINE | Facility: HOSPITAL | Age: 30
End: 2020-01-02
Attending: FAMILY MEDICINE
Payer: MEDICAID

## 2020-01-02 VITALS
WEIGHT: 154 LBS | HEART RATE: 88 BPM | BODY MASS INDEX: 24.17 KG/M2 | TEMPERATURE: 98 F | HEIGHT: 67 IN | DIASTOLIC BLOOD PRESSURE: 88 MMHG | OXYGEN SATURATION: 98 % | SYSTOLIC BLOOD PRESSURE: 120 MMHG

## 2020-01-02 DIAGNOSIS — M25.531 CHRONIC PAIN OF RIGHT WRIST: Primary | ICD-10-CM

## 2020-01-02 DIAGNOSIS — M25.531 RIGHT WRIST PAIN: ICD-10-CM

## 2020-01-02 DIAGNOSIS — G89.29 CHRONIC PAIN OF RIGHT WRIST: Primary | ICD-10-CM

## 2020-01-02 PROCEDURE — 97110 THERAPEUTIC EXERCISES: CPT | Performed by: OCCUPATIONAL THERAPIST

## 2020-01-02 PROCEDURE — 97140 MANUAL THERAPY 1/> REGIONS: CPT | Performed by: OCCUPATIONAL THERAPIST

## 2020-01-02 PROCEDURE — 97530 THERAPEUTIC ACTIVITIES: CPT | Performed by: OCCUPATIONAL THERAPIST

## 2020-01-02 PROCEDURE — 99213 OFFICE O/P EST LOW 20 MIN: CPT | Performed by: FAMILY MEDICINE

## 2020-01-02 RX ORDER — VALACYCLOVIR HYDROCHLORIDE 500 MG/1
1 TABLET, FILM COATED ORAL
COMMUNITY
End: 2020-03-03

## 2020-01-02 NOTE — PROGRESS NOTES
Dx: Right wrist pain       Authorized # of Visits:  8         Next MD visit: none scheduled  Fall Risk: standard         Precautions: n/a           Medication Changes since last visit?: No  Subjective: Patient is reporting pain in volar wrist at FCU tend MURRIETA by 15 degrees for ease in performing yoga. Patient will demonstrate increase in right  strength to 55 lbs for ease in lifting bottles and boxes at work.   Patient will demonstrate increase in right lateral pinch to at least 14 lbs for ease in openi

## 2020-01-02 NOTE — PROGRESS NOTES
HPI:    Patient ID: Beth Ledbetter is a 30year old female who presents for right wrist pain. HPI  Right wrist pain & swelling:  Having a hard time communicating with OT. Started OT on 12/17/19. This morning was 3rd treatment.    Was treated on 17t Base) MCG/ACT Inhalation Aero Soln INHALE 2 PUFFS BY MOUTH EVERY 6 HOURS AS NEEDED FOR SHORTNESS OF BREATH OR WHEEZING     • Hypertonic Nasal Wash (SINUS RINSE REFILL) Nasal Powd Pack 1 packet by Nasal route.      • DiphenhydrAMINE HCl (ALLERGY RELIEF) 25 M ideally still like to trial full course of OT for 4-6 weeks.  -Note provided for employer requesting she switch to a job that requires less use of wrist.   -If pain continues to worsen despite avoiding triggers, then will likely check MRI.    -Still awaitin

## 2020-01-03 ENCOUNTER — APPOINTMENT (OUTPATIENT)
Dept: PHYSICAL THERAPY | Facility: HOSPITAL | Age: 30
End: 2020-01-03
Attending: FAMILY MEDICINE
Payer: MEDICAID

## 2020-01-03 RX ORDER — CYCLOBENZAPRINE HCL 5 MG
TABLET ORAL
Qty: 30 TABLET | Refills: 0 | Status: SHIPPED | OUTPATIENT
Start: 2020-01-03 | End: 2020-02-10

## 2020-01-03 NOTE — TELEPHONE ENCOUNTER
A refill request was received for:  Requested Prescriptions     Pending Prescriptions Disp Refills   • CYCLOBENZAPRINE 5 MG Oral Tab [Pharmacy Med Name: CYCLOBENZAPRINE 5MG TABLETS] 30 tablet 0     Sig: TAKE 1 TABLET(5 MG) BY MOUTH THREE TIMES DAILY AS NEE

## 2020-01-06 ENCOUNTER — OFFICE VISIT (OUTPATIENT)
Dept: PHYSICAL THERAPY | Facility: HOSPITAL | Age: 30
End: 2020-01-06
Attending: FAMILY MEDICINE
Payer: MEDICAID

## 2020-01-06 ENCOUNTER — OFFICE VISIT (OUTPATIENT)
Dept: OCCUPATIONAL MEDICINE | Facility: HOSPITAL | Age: 30
End: 2020-01-06
Attending: FAMILY MEDICINE
Payer: MEDICAID

## 2020-01-06 DIAGNOSIS — M54.31 SCIATICA OF RIGHT SIDE: ICD-10-CM

## 2020-01-06 DIAGNOSIS — G89.29 CHRONIC PAIN OF RIGHT WRIST: ICD-10-CM

## 2020-01-06 DIAGNOSIS — R20.0 ARM NUMBNESS: ICD-10-CM

## 2020-01-06 DIAGNOSIS — G89.29 CHRONIC NECK PAIN: ICD-10-CM

## 2020-01-06 DIAGNOSIS — M25.531 CHRONIC PAIN OF RIGHT WRIST: ICD-10-CM

## 2020-01-06 DIAGNOSIS — M54.2 CHRONIC NECK PAIN: ICD-10-CM

## 2020-01-06 PROCEDURE — 97035 APP MDLTY 1+ULTRASOUND EA 15: CPT | Performed by: OCCUPATIONAL THERAPIST

## 2020-01-06 PROCEDURE — 97110 THERAPEUTIC EXERCISES: CPT | Performed by: OCCUPATIONAL THERAPIST

## 2020-01-06 PROCEDURE — 97530 THERAPEUTIC ACTIVITIES: CPT | Performed by: OCCUPATIONAL THERAPIST

## 2020-01-06 PROCEDURE — 97140 MANUAL THERAPY 1/> REGIONS: CPT | Performed by: OCCUPATIONAL THERAPIST

## 2020-01-06 PROCEDURE — 97110 THERAPEUTIC EXERCISES: CPT

## 2020-01-06 NOTE — PROGRESS NOTES
Dx: Right wrist pain       Authorized # of Visits:  8         Next MD visit: none scheduled  Fall Risk: standard         Precautions: n/a           Medication Changes since last visit?: No  Subjective: Patient reports compliance with wearing kinesiotape Assessment: Patient has not worked since last session, had a chance to rest arm, no tingling or pain in past few days. Patient reporting most pain in ulnar wrist, some tenderness in ulnar fovea, advised patient to avoid resisted forearm pronation/supi

## 2020-01-06 NOTE — PROGRESS NOTES
Dx: Chronic neck pain (M54.2,G89.29)  Sciatica of right side (M54.31)  Arm numbness (R20.0)  Chronic pain of right wrist (M25.531,G89.29)           Insurance (Authorized # of Visits):  [place] [address] Physician will be able to demonstrate independent HEP for basic lower extremity and spinal mobility and basic core stabilization program by 5 sessions  4. Patient will report a reduction of LE referred pain by 50-75% by 8 visits  5.   Patient will demonstrate improv

## 2020-01-07 ENCOUNTER — OFFICE VISIT (OUTPATIENT)
Dept: PHYSICAL THERAPY | Facility: HOSPITAL | Age: 30
End: 2020-01-07
Attending: FAMILY MEDICINE
Payer: MEDICAID

## 2020-01-07 ENCOUNTER — APPOINTMENT (OUTPATIENT)
Dept: OCCUPATIONAL MEDICINE | Facility: HOSPITAL | Age: 30
End: 2020-01-07
Attending: FAMILY MEDICINE
Payer: MEDICAID

## 2020-01-07 PROCEDURE — 97140 MANUAL THERAPY 1/> REGIONS: CPT

## 2020-01-07 PROCEDURE — 97110 THERAPEUTIC EXERCISES: CPT

## 2020-01-07 RX ORDER — ALBUTEROL SULFATE 90 UG/1
AEROSOL, METERED RESPIRATORY (INHALATION)
Qty: 18 G | Refills: 0 | Status: SHIPPED | OUTPATIENT
Start: 2020-01-07 | End: 2020-03-03

## 2020-01-07 NOTE — TELEPHONE ENCOUNTER
A refill request was received for:  Requested Prescriptions     Pending Prescriptions Disp Refills   • ALBUTEROL SULFATE  (90 Base) MCG/ACT Inhalation Aero Soln [Pharmacy Med Name: ALBUTEROL HFA INH (200 PUFFS) 18GM] 18 g 0     Sig: INHALE 2 PUFFS I

## 2020-01-07 NOTE — PROGRESS NOTES
Dx: Chronic neck pain (M54.2,G89.29)  Sciatica of right side (M54.31)  Arm numbness (R20.0)  Chronic pain of right wrist (M25.531,G89.29)           Insurance (Authorized # of Visits):  [place] [address] Physician correction, assisted foam rolling to quads and hip flexors HEP:     Assessment: Patient reported mild nausea with manual distraction supine, and transcient dizziness upon transfer from prone to sidelying to sit.   Otherwise with current exercises today did

## 2020-01-08 ENCOUNTER — OFFICE VISIT (OUTPATIENT)
Dept: PHYSICAL THERAPY | Facility: HOSPITAL | Age: 30
End: 2020-01-08
Attending: FAMILY MEDICINE
Payer: MEDICAID

## 2020-01-08 ENCOUNTER — TELEPHONE (OUTPATIENT)
Dept: PHYSICAL THERAPY | Facility: HOSPITAL | Age: 30
End: 2020-01-08

## 2020-01-08 DIAGNOSIS — R20.0 ARM NUMBNESS: ICD-10-CM

## 2020-01-08 DIAGNOSIS — M54.2 CHRONIC NECK PAIN: ICD-10-CM

## 2020-01-08 DIAGNOSIS — G89.29 CHRONIC NECK PAIN: ICD-10-CM

## 2020-01-08 DIAGNOSIS — M54.31 SCIATICA OF RIGHT SIDE: ICD-10-CM

## 2020-01-08 DIAGNOSIS — G89.29 CHRONIC PAIN OF RIGHT WRIST: ICD-10-CM

## 2020-01-08 DIAGNOSIS — M25.531 CHRONIC PAIN OF RIGHT WRIST: ICD-10-CM

## 2020-01-08 PROCEDURE — 97110 THERAPEUTIC EXERCISES: CPT

## 2020-01-08 NOTE — PROGRESS NOTES
Dx: Chronic neck pain (M54.2,G89.29)  Sciatica of right side (M54.31)  Arm numbness (R20.0)  Chronic pain of right wrist (M25.531,G89.29)           Insurance (Authorized # of Visits):  [place] [address] Physician manual traction  -foam roll to right quad and hip flexor and TFL       TE:  -Supine Paty Lunsford self correction 3 x 3 cycles  -Bridging with squeeze ball 10 x 5 sec   TA bracing with heel slide x 15 R/L  -Hooklying hip add with squeeze ball 15 x 5 sec hold

## 2020-01-09 ENCOUNTER — APPOINTMENT (OUTPATIENT)
Dept: PHYSICAL THERAPY | Facility: HOSPITAL | Age: 30
End: 2020-01-09
Attending: FAMILY MEDICINE
Payer: MEDICAID

## 2020-01-09 ENCOUNTER — APPOINTMENT (OUTPATIENT)
Dept: OCCUPATIONAL MEDICINE | Facility: HOSPITAL | Age: 30
End: 2020-01-09
Attending: FAMILY MEDICINE
Payer: MEDICAID

## 2020-01-10 RX ORDER — FLUCONAZOLE 150 MG/1
150 TABLET ORAL DAILY
Qty: 3 TABLET | Refills: 0 | Status: SHIPPED | OUTPATIENT
Start: 2020-01-10 | End: 2020-01-21

## 2020-01-13 ENCOUNTER — APPOINTMENT (OUTPATIENT)
Dept: OCCUPATIONAL MEDICINE | Facility: HOSPITAL | Age: 30
End: 2020-01-13
Attending: FAMILY MEDICINE
Payer: MEDICAID

## 2020-01-14 ENCOUNTER — APPOINTMENT (OUTPATIENT)
Dept: PHYSICAL THERAPY | Facility: HOSPITAL | Age: 30
End: 2020-01-14
Attending: FAMILY MEDICINE
Payer: MEDICAID

## 2020-01-16 ENCOUNTER — APPOINTMENT (OUTPATIENT)
Dept: OCCUPATIONAL MEDICINE | Facility: HOSPITAL | Age: 30
End: 2020-01-16
Attending: FAMILY MEDICINE
Payer: MEDICAID

## 2020-01-16 ENCOUNTER — APPOINTMENT (OUTPATIENT)
Dept: PHYSICAL THERAPY | Facility: HOSPITAL | Age: 30
End: 2020-01-16
Attending: FAMILY MEDICINE
Payer: MEDICAID

## 2020-01-20 ENCOUNTER — APPOINTMENT (OUTPATIENT)
Dept: OCCUPATIONAL MEDICINE | Facility: HOSPITAL | Age: 30
End: 2020-01-20
Attending: FAMILY MEDICINE
Payer: MEDICAID

## 2020-01-21 ENCOUNTER — APPOINTMENT (OUTPATIENT)
Dept: PHYSICAL THERAPY | Facility: HOSPITAL | Age: 30
End: 2020-01-21
Attending: FAMILY MEDICINE
Payer: MEDICAID

## 2020-01-21 RX ORDER — FLUCONAZOLE 150 MG/1
150 TABLET ORAL DAILY
Qty: 3 TABLET | Refills: 0 | Status: SHIPPED | OUTPATIENT
Start: 2020-01-21 | End: 2020-03-03

## 2020-01-22 ENCOUNTER — APPOINTMENT (OUTPATIENT)
Dept: PHYSICAL THERAPY | Facility: HOSPITAL | Age: 30
End: 2020-01-22
Attending: FAMILY MEDICINE
Payer: MEDICAID

## 2020-01-23 ENCOUNTER — APPOINTMENT (OUTPATIENT)
Dept: OCCUPATIONAL MEDICINE | Facility: HOSPITAL | Age: 30
End: 2020-01-23
Attending: FAMILY MEDICINE
Payer: MEDICAID

## 2020-01-23 ENCOUNTER — APPOINTMENT (OUTPATIENT)
Dept: PHYSICAL THERAPY | Facility: HOSPITAL | Age: 30
End: 2020-01-23
Attending: FAMILY MEDICINE
Payer: MEDICAID

## 2020-01-27 ENCOUNTER — OFFICE VISIT (OUTPATIENT)
Dept: FAMILY MEDICINE CLINIC | Facility: CLINIC | Age: 30
End: 2020-01-27
Payer: MEDICAID

## 2020-01-27 VITALS
HEIGHT: 67 IN | DIASTOLIC BLOOD PRESSURE: 78 MMHG | HEART RATE: 96 BPM | SYSTOLIC BLOOD PRESSURE: 124 MMHG | OXYGEN SATURATION: 98 % | WEIGHT: 154 LBS | TEMPERATURE: 99 F | BODY MASS INDEX: 24.17 KG/M2

## 2020-01-27 DIAGNOSIS — M54.2 CHRONIC NECK PAIN: ICD-10-CM

## 2020-01-27 DIAGNOSIS — M54.42 CHRONIC BILATERAL LOW BACK PAIN WITH BILATERAL SCIATICA: ICD-10-CM

## 2020-01-27 DIAGNOSIS — G89.29 CHRONIC PAIN OF RIGHT WRIST: Primary | ICD-10-CM

## 2020-01-27 DIAGNOSIS — M25.531 CHRONIC PAIN OF RIGHT WRIST: Primary | ICD-10-CM

## 2020-01-27 DIAGNOSIS — G89.29 CHRONIC NECK PAIN: ICD-10-CM

## 2020-01-27 DIAGNOSIS — G89.29 CHRONIC BILATERAL LOW BACK PAIN WITH BILATERAL SCIATICA: ICD-10-CM

## 2020-01-27 DIAGNOSIS — M54.41 CHRONIC BILATERAL LOW BACK PAIN WITH BILATERAL SCIATICA: ICD-10-CM

## 2020-01-27 PROCEDURE — 99214 OFFICE O/P EST MOD 30 MIN: CPT | Performed by: FAMILY MEDICINE

## 2020-01-27 RX ORDER — GABAPENTIN 100 MG/1
100 CAPSULE ORAL 3 TIMES DAILY
Qty: 90 CAPSULE | Refills: 0 | Status: SHIPPED | OUTPATIENT
Start: 2020-01-27 | End: 2020-03-03

## 2020-01-27 RX ORDER — CLINDAMYCIN HYDROCHLORIDE 300 MG/1
CAPSULE ORAL
COMMUNITY
Start: 2020-01-23 | End: 2020-03-03

## 2020-01-27 RX ORDER — HYDROCODONE BITARTRATE AND ACETAMINOPHEN 5; 325 MG/1; MG/1
TABLET ORAL
Refills: 0 | COMMUNITY
Start: 2020-01-08 | End: 2020-03-03

## 2020-01-27 RX ORDER — ACETAMINOPHEN AND CODEINE PHOSPHATE 120; 12 MG/5ML; MG/5ML
SOLUTION ORAL
COMMUNITY
Start: 2020-01-06 | End: 2020-10-05

## 2020-01-27 NOTE — PROGRESS NOTES
HPI:    Patient ID: Isaura Tucker is a 30year old female who presents for right wrist & neck & low-back pain. HPI  Joint pains everywhere tend to be a bit better since she's been on the steroids, but not by much. Pain is still very bothersome.   Only HYDROcodone-acetaminophen 5-325 MG Oral Tab   0   • gabapentin 100 MG Oral Cap Take 1 capsule (100 mg total) by mouth 3 (three) times daily. 90 capsule 0   • fluconazole 150 MG Oral Tab Take 1 tablet (150 mg total) by mouth daily.  3 tablet 0   • CYCLOBENZA state.   Neurological: Positive for numbness. Negative for weakness. Hematological: Negative.             /78   Pulse 96   Temp 98.6 °F (37 °C) (Oral)   Ht 67\"   Wt 154 lb (69.9 kg)   SpO2 98%   BMI 24.12 kg/m²     PHYSICAL EXAM:   Physical Exam sciatica    1. Chronic pain of right wrist  - MRI WRIST, RIGHT (CPT=73221); Future    -XR normal in ~05/2019 per patient. -s/p OT & HEP with no major improvement.   -Check MRI. -Continue supportive care in the meantime.      2. Chronic neck pain  - MRI

## 2020-01-28 ENCOUNTER — APPOINTMENT (OUTPATIENT)
Dept: PHYSICAL THERAPY | Facility: HOSPITAL | Age: 30
End: 2020-01-28
Attending: FAMILY MEDICINE
Payer: MEDICAID

## 2020-01-29 ENCOUNTER — APPOINTMENT (OUTPATIENT)
Dept: PHYSICAL THERAPY | Facility: HOSPITAL | Age: 30
End: 2020-01-29
Attending: FAMILY MEDICINE
Payer: MEDICAID

## 2020-01-30 ENCOUNTER — APPOINTMENT (OUTPATIENT)
Dept: OCCUPATIONAL MEDICINE | Facility: HOSPITAL | Age: 30
End: 2020-01-30
Attending: FAMILY MEDICINE
Payer: MEDICAID

## 2020-01-30 DIAGNOSIS — R21 RASH: Primary | ICD-10-CM

## 2020-02-02 ENCOUNTER — HOSPITAL ENCOUNTER (EMERGENCY)
Facility: HOSPITAL | Age: 30
Discharge: HOME | End: 2020-02-02
Payer: MEDICAID

## 2020-02-02 VITALS
BODY MASS INDEX: 24.8 KG/M2 | HEIGHT: 66 IN | RESPIRATION RATE: 16 BRPM | SYSTOLIC BLOOD PRESSURE: 130 MMHG | TEMPERATURE: 99 F | WEIGHT: 154.31 LBS | HEART RATE: 97 BPM | OXYGEN SATURATION: 99 % | DIASTOLIC BLOOD PRESSURE: 90 MMHG

## 2020-02-02 DIAGNOSIS — L25.9 CONTACT DERMATITIS, UNSPECIFIED CONTACT DERMATITIS TYPE, UNSPECIFIED TRIGGER: Primary | ICD-10-CM

## 2020-02-02 PROCEDURE — 99283 EMERGENCY DEPT VISIT LOW MDM: CPT

## 2020-02-02 RX ORDER — HYDROXYZINE HYDROCHLORIDE 25 MG/1
25 TABLET, FILM COATED ORAL EVERY 8 HOURS PRN
Qty: 15 TABLET | Refills: 0 | Status: SHIPPED | OUTPATIENT
Start: 2020-02-02 | End: 2020-02-07

## 2020-02-02 RX ORDER — PREDNISONE 20 MG/1
40 TABLET ORAL DAILY
Qty: 8 TABLET | Refills: 0 | Status: SHIPPED | OUTPATIENT
Start: 2020-02-02 | End: 2020-02-06

## 2020-02-02 NOTE — ED PROVIDER NOTES
Patient Seen in: [facility] AND [facility] Emergency Department    History   CC: rash  HPI: Edda Najjar 30year old female  who presents to the ER c/o red, raised, itchy rash to her chest, face and mildly to her upper back for the last 5 days.   States this for Itching. predniSONE 20 MG Oral Tab,  Take 2 tablets (40 mg total) by mouth daily for 4 days.    Clindamycin HCl 300 MG Oral Cap,  TK ONE C PO  Q  8 H UNTIL GONE   HYDROcodone-acetaminophen 5-325 MG Oral Tab,     Norethindrone 0.35 MG Oral Tab,     adryan limited ROM  Eyes - sclera not injected, no discharge noted, no periorbital edema  ENT - EAC bilaterally without discharge, TM pearly grey with COL visualized appropriately bilaterally.    no nasal drainage noted in nares bilat, no cobblestoning to post. Ph days      Isauro Wagner MD  [address] [place] [address]  [phone]    Schedule an appointment as soon as possible for a visit in 2 days        Medications Prescribed:  Current Discharge Medication List    START taking these medications    hydrOXY

## 2020-02-02 NOTE — CM/SW NOTE
Spoke to pt and instructed that the [facility] will call her tomorrow with a possible appointment change. Her Derm appointment with Dr Иван Hernandez is on 2/27. We will try to get a sooner appointment.

## 2020-02-02 NOTE — ED INITIAL ASSESSMENT (HPI)
Pt c/o red bumps in the form of a rash to face, chest, and back x5 days. Pt states she started gabapentin and after first dose developed rash, pt has since stopped taking. Pt states she's been taking benadryl, zyrtec, and cortisone with no relief.  Pt state

## 2020-02-03 ENCOUNTER — OFFICE VISIT (OUTPATIENT)
Dept: FAMILY MEDICINE CLINIC | Facility: CLINIC | Age: 30
End: 2020-02-03
Payer: MEDICAID

## 2020-02-03 ENCOUNTER — DOCUMENTATION ONLY (OUTPATIENT)
Dept: CASE MANAGEMENT | Age: 30
End: 2020-02-03

## 2020-02-03 VITALS
HEIGHT: 66 IN | HEART RATE: 102 BPM | BODY MASS INDEX: 24.27 KG/M2 | WEIGHT: 151 LBS | OXYGEN SATURATION: 98 % | DIASTOLIC BLOOD PRESSURE: 78 MMHG | TEMPERATURE: 99 F | SYSTOLIC BLOOD PRESSURE: 120 MMHG

## 2020-02-03 DIAGNOSIS — G89.29 OTHER CHRONIC PAIN: ICD-10-CM

## 2020-02-03 DIAGNOSIS — L28.2 PRURITIC RASH: Primary | ICD-10-CM

## 2020-02-03 PROCEDURE — 99213 OFFICE O/P EST LOW 20 MIN: CPT | Performed by: FAMILY MEDICINE

## 2020-02-03 NOTE — PROGRESS NOTES
Called and made apt. With Dr. Laurent Murray. Jan 7th @10am Pt. Also has apt 2/27     Called to let pt. Know about earlier apt. - no answer let pt. A message. Pt. Aware of apt. Change.

## 2020-02-03 NOTE — PROGRESS NOTES
HPI:    Patient ID: Ian Bryant is a 30year old female who presents for ED f/u. HPI  Went to ED yesterday for red and itchy rash on chest, face, and upper back for the preceding 5 days. Rash was burning and itching. Looks like small red bumps. SHORTNESS OF BREATH OR WHEEZING 18 g 0   • CYCLOBENZAPRINE 5 MG Oral Tab TAKE 1 TABLET(5 MG) BY MOUTH THREE TIMES DAILY AS NEEDED FOR MUSCLE SPASMS 30 tablet 0   • valACYclovir HCl 500 MG Oral Tab Take 1 tablet by mouth.      • NON FORMULARY Catalina or Mylan respiratory distress. She has no wheezes. She has no rales. Neurological: She is alert. Skin: Skin is warm and dry. Rash (Scarce red papular rash noted on neck and chest. ) noted. Psychiatric: She has a normal mood and affect.  Judgment normal.

## 2020-02-04 ENCOUNTER — TELEPHONE (OUTPATIENT)
Dept: FAMILY MEDICINE CLINIC | Facility: CLINIC | Age: 30
End: 2020-02-04

## 2020-02-04 DIAGNOSIS — R21 RASH: Primary | ICD-10-CM

## 2020-02-04 DIAGNOSIS — R76.8 ELEVATED ANTINUCLEAR ANTIBODY (ANA) LEVEL: ICD-10-CM

## 2020-02-04 NOTE — TELEPHONE ENCOUNTER
This RN spoke with pt who states she is still itching. Informed pt that she can take Hydroxyzine Q 8 hrs and pt also still taking prednisone. Pt has only had one dose of Hydroxyzine so far. Informed pt that pt's msg was routed to MP. Advised pt to call back if symptoms not improving despite meds. Pt verbalized understanding and agrees with POC.

## 2020-02-07 ENCOUNTER — OFFICE VISIT (OUTPATIENT)
Dept: DERMATOLOGY CLINIC | Facility: CLINIC | Age: 30
End: 2020-02-07
Payer: MEDICAID

## 2020-02-07 DIAGNOSIS — L30.9 DERMATITIS: Primary | ICD-10-CM

## 2020-02-07 DIAGNOSIS — L50.9 URTICARIA: ICD-10-CM

## 2020-02-07 PROCEDURE — 99203 OFFICE O/P NEW LOW 30 MIN: CPT | Performed by: DERMATOLOGY

## 2020-02-07 RX ORDER — LEVOCETIRIZINE DIHYDROCHLORIDE 5 MG/1
5 TABLET, FILM COATED ORAL EVERY EVENING
Qty: 30 TABLET | Refills: 5 | Status: SHIPPED | OUTPATIENT
Start: 2020-02-07 | End: 2020-02-17

## 2020-02-07 RX ORDER — FAMOTIDINE 40 MG/1
40 TABLET, FILM COATED ORAL 2 TIMES DAILY
Qty: 60 TABLET | Refills: 5 | Status: SHIPPED | OUTPATIENT
Start: 2020-02-07 | End: 2020-09-22

## 2020-02-07 RX ORDER — CLOBETASOL PROPIONATE 0.5 MG/G
1 CREAM TOPICAL 2 TIMES DAILY
Qty: 60 G | Refills: 3 | Status: SHIPPED | OUTPATIENT
Start: 2020-02-07 | End: 2020-03-03

## 2020-02-07 RX ORDER — HYDROXYZINE HYDROCHLORIDE 25 MG/1
25 TABLET, FILM COATED ORAL EVERY 8 HOURS PRN
Qty: 90 TABLET | Refills: 2 | Status: SHIPPED | OUTPATIENT
Start: 2020-02-07 | End: 2020-02-14

## 2020-02-09 ENCOUNTER — PATIENT MESSAGE (OUTPATIENT)
Dept: FAMILY MEDICINE CLINIC | Facility: CLINIC | Age: 30
End: 2020-02-09

## 2020-02-10 RX ORDER — CYCLOBENZAPRINE HCL 5 MG
TABLET ORAL
Qty: 30 TABLET | Refills: 0 | Status: SHIPPED | OUTPATIENT
Start: 2020-02-10 | End: 2020-02-28

## 2020-02-10 NOTE — TELEPHONE ENCOUNTER
From: Joselyn Torres  To: Mayra Joyce DO  Sent: 2/9/2020 1:22 PM CST  Subject: Prescription Question    Can I please have my cyclobenzaprine refilled?

## 2020-02-10 NOTE — TELEPHONE ENCOUNTER
A refill request was received for:  Requested Prescriptions     Pending Prescriptions Disp Refills   • cyclobenzaprine 5 MG Oral Tab 30 tablet 0     Sig: TAKE 1 TABLET(5 MG) BY MOUTH THREE TIMES DAILY AS NEEDED FOR MUSCLE SPASMS     Last refill date: 1/3/2

## 2020-02-13 ENCOUNTER — OFFICE VISIT (OUTPATIENT)
Dept: ALLERGY | Facility: CLINIC | Age: 30
End: 2020-02-13
Payer: MEDICAID

## 2020-02-13 ENCOUNTER — APPOINTMENT (OUTPATIENT)
Dept: LAB | Age: 30
End: 2020-02-13
Attending: ALLERGY & IMMUNOLOGY
Payer: MEDICAID

## 2020-02-13 VITALS
HEART RATE: 99 BPM | BODY MASS INDEX: 24.27 KG/M2 | SYSTOLIC BLOOD PRESSURE: 135 MMHG | DIASTOLIC BLOOD PRESSURE: 86 MMHG | TEMPERATURE: 99 F | WEIGHT: 151 LBS | HEIGHT: 66 IN | RESPIRATION RATE: 18 BRPM

## 2020-02-13 DIAGNOSIS — L50.1 CHRONIC IDIOPATHIC URTICARIA: ICD-10-CM

## 2020-02-13 DIAGNOSIS — L50.1 CHRONIC IDIOPATHIC URTICARIA: Primary | ICD-10-CM

## 2020-02-13 LAB
ERYTHROCYTE [SEDIMENTATION RATE] IN BLOOD: 12 MM/HR (ref 0–20)
TSI SER-ACNC: 0.72 MIU/ML (ref 0.36–3.74)

## 2020-02-13 PROCEDURE — 99214 OFFICE O/P EST MOD 30 MIN: CPT | Performed by: ALLERGY & IMMUNOLOGY

## 2020-02-13 PROCEDURE — 84443 ASSAY THYROID STIM HORMONE: CPT

## 2020-02-13 PROCEDURE — 82785 ASSAY OF IGE: CPT

## 2020-02-13 PROCEDURE — 36415 COLL VENOUS BLD VENIPUNCTURE: CPT

## 2020-02-13 PROCEDURE — 85652 RBC SED RATE AUTOMATED: CPT

## 2020-02-13 PROCEDURE — 86003 ALLG SPEC IGE CRUDE XTRC EA: CPT | Performed by: ALLERGY & IMMUNOLOGY

## 2020-02-13 PROCEDURE — 86003 ALLG SPEC IGE CRUDE XTRC EA: CPT

## 2020-02-13 NOTE — PROGRESS NOTES
Francis Rebolledo is a 30year old female. HPI:   Patient presents with:  Hives: Patient reports severe breakout with hives. Has been having episodes of chronic hives for past 10 years. Currently taking Xyzal once a day to manage.  Patient reports having Tobacco Use      Smoking status: Former Smoker      Smokeless tobacco: Never Used      Tobacco comment: 1 cigarette a month    Alcohol use:  Yes      Alcohol/week: 0.0 standard drinks      Comment: rarely    Drug use: Yes      Types: Cannabis       Medicati INHALE 2 PUFFS INTO THE LUNGS EVERY 6 HOURS AS NEEDED FOR SHORTNESS OF BREATH OR WHEEZING 18 g 0   • NON FORMULARY Catalina or Mylan otc BCP     • Hypertonic Nasal Wash (SINUS RINSE REFILL) Nasal Powd Pack 1 packet by Nasal route.      • DiphenhydrAMINE HCl (A ASSESSMENT/PLAN:   Assessment   Chronic idiopathic urticaria  (primary encounter diagnosis)     Patient currently on antihistamines with last dose being last night     Recs:  Handouts on chronic urticaria provided and reviewed including the majority bein

## 2020-02-13 NOTE — PATIENT INSTRUCTIONS
Recs:  Handouts on chronic urticaria provided and reviewed including the majority being idiopathic in nature.   Recommend to try titrating Xyzal from once a day up to 2 3 or 4 times per day if needed  Continue with additional treatments from dermatology inc

## 2020-02-15 ENCOUNTER — TELEPHONE (OUTPATIENT)
Dept: ALLERGY | Facility: CLINIC | Age: 30
End: 2020-02-15

## 2020-02-15 NOTE — TELEPHONE ENCOUNTER
Patient viewed results and Dr. Jenny Huddleston message via [address] on 2/14/2020.   Will call with results when Allergy Food Panel and Allergy Region 8 Panel return.      ----- Message from Flako Ramirez MD sent at 2/14/2020  9:08 AM CST -----  jodi Rogers thyro

## 2020-02-17 ENCOUNTER — TELEPHONE (OUTPATIENT)
Dept: ALLERGY | Facility: CLINIC | Age: 30
End: 2020-02-17

## 2020-02-17 ENCOUNTER — HOSPITAL ENCOUNTER (OUTPATIENT)
Age: 30
Discharge: HOME | End: 2020-02-17
Attending: FAMILY MEDICINE
Payer: MEDICAID

## 2020-02-17 ENCOUNTER — APPOINTMENT (OUTPATIENT)
Dept: GENERAL RADIOLOGY | Age: 30
End: 2020-02-17
Attending: FAMILY MEDICINE
Payer: MEDICAID

## 2020-02-17 ENCOUNTER — PATIENT MESSAGE (OUTPATIENT)
Dept: ALLERGY | Facility: CLINIC | Age: 30
End: 2020-02-17

## 2020-02-17 VITALS
OXYGEN SATURATION: 100 % | RESPIRATION RATE: 19 BRPM | DIASTOLIC BLOOD PRESSURE: 90 MMHG | TEMPERATURE: 99 F | SYSTOLIC BLOOD PRESSURE: 147 MMHG | HEART RATE: 100 BPM

## 2020-02-17 DIAGNOSIS — A38.9 SCARLET FEVER: Primary | ICD-10-CM

## 2020-02-17 LAB

## 2020-02-17 PROCEDURE — 87486 CHLMYD PNEUM DNA AMP PROBE: CPT | Performed by: FAMILY MEDICINE

## 2020-02-17 PROCEDURE — 87430 STREP A AG IA: CPT

## 2020-02-17 PROCEDURE — 99214 OFFICE O/P EST MOD 30 MIN: CPT

## 2020-02-17 PROCEDURE — 87798 DETECT AGENT NOS DNA AMP: CPT | Performed by: FAMILY MEDICINE

## 2020-02-17 PROCEDURE — 87581 M.PNEUMON DNA AMP PROBE: CPT | Performed by: FAMILY MEDICINE

## 2020-02-17 PROCEDURE — 87633 RESP VIRUS 12-25 TARGETS: CPT | Performed by: FAMILY MEDICINE

## 2020-02-17 PROCEDURE — 71046 X-RAY EXAM CHEST 2 VIEWS: CPT | Performed by: FAMILY MEDICINE

## 2020-02-17 PROCEDURE — 99213 OFFICE O/P EST LOW 20 MIN: CPT

## 2020-02-17 RX ORDER — AZITHROMYCIN 500 MG/1
500 TABLET, FILM COATED ORAL DAILY
Qty: 3 TABLET | Refills: 0 | Status: SHIPPED | OUTPATIENT
Start: 2020-02-17 | End: 2020-02-22

## 2020-02-17 RX ORDER — HYDROXYZINE HYDROCHLORIDE 25 MG/1
TABLET, FILM COATED ORAL
COMMUNITY
Start: 2020-02-04 | End: 2020-11-03

## 2020-02-17 RX ORDER — LEVOCETIRIZINE DIHYDROCHLORIDE 5 MG/1
5 TABLET, FILM COATED ORAL 4 TIMES DAILY
Qty: 120 TABLET | Refills: 0 | Status: SHIPPED | OUTPATIENT
Start: 2020-02-17 | End: 2020-03-17

## 2020-02-17 NOTE — TELEPHONE ENCOUNTER
Pt's telephone call transferred from phone room. Pt spoken with at time of original call. Pt last seen in Allergy 2/13/2020 for Chronic Idiopathic urticaria.      Triage)      Pt reports that she is experiencing tightness in chest and coughing with some

## 2020-02-17 NOTE — TELEPHONE ENCOUNTER
Dr. Albert Galarza,   Please see request. Viji Finn pended. In last office visit note you had recommended to titrate from once daily to up to four times daily.

## 2020-02-17 NOTE — ED INITIAL ASSESSMENT (HPI)
Pt states having a rash for 3 days. Pt states has been logging pictures of rash that seems to be spreading over body. Pt states has been taking medication prescribed for allergies. Pt states also developing a cough with morning.   Pt states woke up with ach

## 2020-02-17 NOTE — TELEPHONE ENCOUNTER
Call reviewed and noted. Agree with triage advice provided. If concerns for strep throat/scarlet fever then recommend follow-up with PCP as well as completing prescribed course of antibiotics for underlying infection.   As previously recommended if  joint

## 2020-02-17 NOTE — ED PROVIDER NOTES
Patient Seen [address]: [address]      History   Patient presents with:  Rash Skin Problem    Stated Complaint: BODY RASH     HPI    25yo F with a PMHx sig for idiopathic chronic urticaria presents to IC with rashes to the [facility] complaint: BODY RASH   Other systems are as noted in HPI. Constitutional and vital signs reviewed. All other systems reviewed and negative except as noted above.     Physical Exam     ED Triage Vitals [02/17/20 0946]   /90   Pulse 100   Resp 19 General: No focal deficit present. Mental Status: She is alert and oriented to person, place, and time.       Gait: Gait normal.           ED Course     Labs Reviewed   [facility] POCT RAPID STREP - Abnormal; Notable for the following components:       Resul obtained.             Disposition and Plan     Clinical Impression:  Scarlet fever  (primary encounter diagnosis)    Disposition:  Discharge  2/17/2020 10:30 am    Follow-up:  Abad Bautista DO  [address] [place] [address] [address] [place] [address]

## 2020-02-17 NOTE — TELEPHONE ENCOUNTER
Call reviewed and noted. Agree with urgent care evaluation at this time. Albuterol 2 puffs every 4 hours as needed.

## 2020-02-17 NOTE — PROGRESS NOTES
Saeid Gill is a 30year old female. Patient presents with:  Derm Problem: LOV 12/17/12. pt presenting today with rash to face, neck,chest and back for over 10 years.  pt states rash was so severe , went to ER Feb.2. an was prescribed [id number] INHALE 2 PUFFS INTO THE LUNGS EVERY 6 HOURS AS NEEDED FOR SHORTNESS OF BREATH OR WHEEZING 18 g 0   • NON FORMULARY Catalina or Mylan otc BCP     • clonazePAM 0.5 MG Oral Tab TAKE 1 TABLET PO TID  0   • Hypertonic Nasal Wash (SINUS RINSE REFILL) Nasal Powd Pac TAKE 1 TABLET(5 MG) BY MOUTH THREE TIMES DAILY AS NEEDED FOR MUSCLE SPASMS 30 tablet 0   • Clindamycin HCl 300 MG Oral Cap TK ONE C PO  Q  8 H UNTIL GONE     • HYDROcodone-acetaminophen 5-325 MG Oral Tab   0   • gabapentin 100 MG Oral Cap Take 1 capsule (1 needs: Medical: Not on file        Non-medical: Not on file    Tobacco Use      Smoking status: Former Smoker      Smokeless tobacco: Never Used      Tobacco comment: 1 cigarette a month    Substance and Sexual Activity      Alcohol use:  Yes HPI :      Patient presents with:  Derm Problem: LOV 12/17/12. pt presenting today with rash to face, neck,chest and back for over 10 years.  pt states rash was so severe , went to ER Feb.2. an was prescribed [id number] and Hyroxyzine 25mg with swelling. No other skin complaints. History, medications, allergies as noted. Nothing new or different no unusual exposures. No changes in soaps, detergents, skin care products. No recent travel. No else at home itching. No recent illnesses. cleansers detergent etc. reviewed with patient. Potential allergen, irritants reviewed as well. Pathophysiology reviewed. Patient will let us know how they are doing over the next several weeks. Await clinical response to above therapy.       RTC as no

## 2020-02-17 NOTE — TELEPHONE ENCOUNTER
Yennifer/ Pharmacist of [employer] is calling to confirm directions of patient's medication, Levocetirizine Dihydrochloride 5 MG Oral Tab. Please advise.

## 2020-02-17 NOTE — TELEPHONE ENCOUNTER
Patient returning phone call. She stated that Urgent Care gave her the option of going to ER for further testing for Rheumatic Fever or take ordered antibiotics and if not feeling better in 48 hours to follow up with PCP.     Patient expressed frustratio

## 2020-02-17 NOTE — TELEPHONE ENCOUNTER
From: Stanford Meyers  To: Bhavesh Villa MD  Sent: 2/17/2020 4:26 PM CST  Subject: Prescription Question    Good afternoon,    Can I have a new prescription for the xyzal, I will be out of the old one Dr Jamison Mcardle gave me this Thursday.  It will need to b

## 2020-02-17 NOTE — TELEPHONE ENCOUNTER
Prescription for Xyzal 4 times per day sent to patient's pharmacy. At times the insurance may not cover up to 4 times per day.   If they do not cover Xyzal 4 times per day then she may have to buy additional Xyzal over-the-counter

## 2020-02-17 NOTE — TELEPHONE ENCOUNTER
RN left detailed message with Dr. Jesu Robles recommendations listed below. RN provided call back number and office hours if patient has any further concerns.

## 2020-02-18 ENCOUNTER — HOSPITAL ENCOUNTER (OUTPATIENT)
Dept: MRI IMAGING | Age: 30
Discharge: HOME | End: 2020-02-18
Attending: FAMILY MEDICINE
Payer: MEDICAID

## 2020-02-18 ENCOUNTER — OFFICE VISIT (OUTPATIENT)
Dept: FAMILY MEDICINE CLINIC | Facility: CLINIC | Age: 30
End: 2020-02-18
Payer: MEDICAID

## 2020-02-18 ENCOUNTER — PATIENT MESSAGE (OUTPATIENT)
Dept: ALLERGY | Facility: CLINIC | Age: 30
End: 2020-02-18

## 2020-02-18 DIAGNOSIS — M54.2 CHRONIC NECK PAIN: ICD-10-CM

## 2020-02-18 DIAGNOSIS — M54.41 CHRONIC BILATERAL LOW BACK PAIN WITH BILATERAL SCIATICA: ICD-10-CM

## 2020-02-18 DIAGNOSIS — M25.531 CHRONIC PAIN OF RIGHT WRIST: ICD-10-CM

## 2020-02-18 DIAGNOSIS — G89.29 CHRONIC BILATERAL LOW BACK PAIN WITH BILATERAL SCIATICA: ICD-10-CM

## 2020-02-18 DIAGNOSIS — G89.29 CHRONIC NECK PAIN: ICD-10-CM

## 2020-02-18 DIAGNOSIS — L50.1 CHRONIC IDIOPATHIC URTICARIA: Primary | ICD-10-CM

## 2020-02-18 DIAGNOSIS — J02.0 STREP PHARYNGITIS: ICD-10-CM

## 2020-02-18 DIAGNOSIS — G89.29 CHRONIC PAIN OF RIGHT WRIST: ICD-10-CM

## 2020-02-18 DIAGNOSIS — M54.42 CHRONIC BILATERAL LOW BACK PAIN WITH BILATERAL SCIATICA: ICD-10-CM

## 2020-02-18 LAB
A ALTERNATA IGE QN: <0.1 KUA/L (ref ?–0.1)
A FUMIGATUS IGE QN: <0.1 KUA/L (ref ?–0.1)
AMER SYCAMORE IGE QN: <0.1 KUA/L (ref ?–0.1)
BERMUDA GRASS IGE QN: <0.1 KUA/L (ref ?–0.1)
BOXELDER IGE QN: <0.1 KUA/L (ref ?–0.1)
C HERBARUM IGE QN: <0.1 KUA/L (ref ?–0.1)
CALIF WALNUT IGE QN: <0.1 KUA/L (ref ?–0.1)
CAT DANDER IGE QN: <0.1 KUA/L (ref ?–0.1)
CLAM IGE QN: <0.1 KUA/L (ref ?–0.1)
CMN PIGWEED IGE QN: <0.1 KUA/L (ref ?–0.1)
CODFISH IGE QN: <0.1 KUA/L (ref ?–0.1)
COMMON RAGWEED IGE QN: <0.1 KUA/L (ref ?–0.1)
CORN IGE QN: <0.1 KUA/L (ref ?–0.1)
COTTONWOOD IGE QN: <0.1 KUA/L (ref ?–0.1)
COW MILK IGE QN: <0.1 KUA/L (ref ?–0.1)
D FARINAE IGE QN: <0.1 KUA/L (ref ?–0.1)
D PTERONYSS IGE QN: <0.1 KUA/L (ref ?–0.1)
DOG DANDER IGE QN: <0.1 KUA/L (ref ?–0.1)
EGG WHITE IGE QN: <0.1 KUA/L (ref ?–0.1)
IGE SERPL-ACNC: 41.6 KU/L (ref 2–214)
IGE SERPL-ACNC: 42.8 KU/L (ref 2–214)
M RACEMOSUS IGE QN: <0.1 KUA/L (ref ?–0.1)
MARSH ELDER IGE QN: <0.1 KUA/L (ref ?–0.1)
MOUSE EPITH IGE QN: <0.1 KUA/L (ref ?–0.1)
MT JUNIPER IGE QN: <0.1 KUA/L (ref ?–0.1)
P NOTATUM IGE QN: <0.1 KUA/L (ref ?–0.1)
PEANUT IGE QN: <0.1 KUA/L (ref ?–0.1)
PECAN/HICK TREE IGE QN: <0.1 KUA/L (ref ?–0.1)
ROACH IGE QN: <0.1 KUA/L (ref ?–0.1)
SALTWORT IGE QN: <0.1 KUA/L (ref ?–0.1)
SCALLOP IGE QN: <0.1 KUA/L (ref ?–0.1)
SESAME SEED IGE QN: <0.1 KUA/L (ref ?–0.1)
SHRIMP IGE QN: <0.1 KUA/L (ref ?–0.1)
SOYBEAN IGE QN: <0.1 KUA/L (ref ?–0.1)
TIMOTHY IGE QN: <0.1 KUA/L (ref ?–0.1)
WALNUT IGE QN: <0.1 KUA/L (ref ?–0.1)
WHEAT IGE QN: <0.1 KUA/L (ref ?–0.1)
WHITE ASH IGE QN: <0.1 KUA/L (ref ?–0.1)
WHITE ELM IGE QN: <0.1 KUA/L (ref ?–0.1)
WHITE MULBERRY IGE QN: <0.1 KUA/L (ref ?–0.1)
WHITE OAK IGE QN: <0.1 KUA/L (ref ?–0.1)

## 2020-02-18 PROCEDURE — 72148 MRI LUMBAR SPINE W/O DYE: CPT | Performed by: FAMILY MEDICINE

## 2020-02-18 PROCEDURE — 72141 MRI NECK SPINE W/O DYE: CPT | Performed by: FAMILY MEDICINE

## 2020-02-18 PROCEDURE — 73221 MRI JOINT UPR EXTREM W/O DYE: CPT | Performed by: FAMILY MEDICINE

## 2020-02-18 PROCEDURE — 99214 OFFICE O/P EST MOD 30 MIN: CPT | Performed by: FAMILY MEDICINE

## 2020-02-18 NOTE — TELEPHONE ENCOUNTER
From: Marii Bonilla  To: Alva Tirado MD  Sent: 2/18/2020 7:42 AM CST  Subject: Prescription Question    Thank you,    Full prescription is showing as covered.

## 2020-02-18 NOTE — PROGRESS NOTES
HPI:    Patient ID: Chao Maurer is a 30year old female who presents for IC f/u and MMP. HPI  Feels much better since IC. Taking zpak, but never had sore throat. Unsure if she has had strep in past.  Skin rash much improved.    Has MRIs scheduled t by mouth 2 (two) times daily. 60 tablet 5   • Clobetasol Propionate 0.05 % External Cream Apply 1 Application topically 2 (two) times daily.  (Patient not taking: Reported on 2/13/2020 ) 60 g 3   • triamcinolone acetonide 0.1 % External Cream Apply topicall fatigue. Negative for activity change, appetite change, chills, fever and unexpected weight change. HENT: Negative for congestion, dental problem, ear pain, hearing loss, mouth sores, rhinorrhea and sore throat.     Eyes: Negative for pain and visual dist encounter diagnosis)  Strep pharyngitis  Chronic neck pain  Chronic bilateral low back pain with bilateral sciatica  Chronic pain of right wrist    1.  Chronic idiopathic urticaria  -Reviewed recent Dermatology, Allergy/Immunology, and IC visit notes and re

## 2020-02-20 VITALS
SYSTOLIC BLOOD PRESSURE: 130 MMHG | HEART RATE: 109 BPM | RESPIRATION RATE: 16 BRPM | HEIGHT: 66 IN | BODY MASS INDEX: 23.95 KG/M2 | TEMPERATURE: 99 F | WEIGHT: 149 LBS | DIASTOLIC BLOOD PRESSURE: 90 MMHG | OXYGEN SATURATION: 98 %

## 2020-02-20 PROBLEM — F31.81 BIPOLAR II DISORDER (HCC): Status: ACTIVE | Noted: 2020-02-20

## 2020-02-20 PROBLEM — F90.9 ADHD: Status: ACTIVE | Noted: 2018-07-23

## 2020-02-20 NOTE — TELEPHONE ENCOUNTER
Patient viewed results and Dr. Heaton Chamber message via [address] RN calling to follow up if patient has any further questions. Provided call back number and office hours.       Notes recorded by Arthur Victoria MD on 2/18/2020 at 2:07 PM CST  Zeynep Szymanski your se

## 2020-02-21 DIAGNOSIS — S62.154A CLOSED NONDISPLACED FRACTURE OF HOOK PROCESS OF HAMATE OF RIGHT WRIST, INITIAL ENCOUNTER: Primary | ICD-10-CM

## 2020-02-21 DIAGNOSIS — G89.29 CHRONIC BILATERAL LOW BACK PAIN WITH BILATERAL SCIATICA: ICD-10-CM

## 2020-02-21 DIAGNOSIS — M54.2 CHRONIC NECK PAIN: ICD-10-CM

## 2020-02-21 DIAGNOSIS — M54.41 CHRONIC BILATERAL LOW BACK PAIN WITH BILATERAL SCIATICA: ICD-10-CM

## 2020-02-21 DIAGNOSIS — M54.42 CHRONIC BILATERAL LOW BACK PAIN WITH BILATERAL SCIATICA: ICD-10-CM

## 2020-02-21 DIAGNOSIS — G89.29 CHRONIC NECK PAIN: ICD-10-CM

## 2020-02-26 ENCOUNTER — PATIENT MESSAGE (OUTPATIENT)
Dept: ALLERGY | Facility: CLINIC | Age: 30
End: 2020-02-26

## 2020-02-26 DIAGNOSIS — M25.50 POLYARTHRALGIA: Primary | ICD-10-CM

## 2020-02-26 DIAGNOSIS — G89.29 OTHER CHRONIC PAIN: ICD-10-CM

## 2020-02-27 ENCOUNTER — HOSPITAL ENCOUNTER (EMERGENCY)
Facility: HOSPITAL | Age: 30
Discharge: HOME | End: 2020-02-27
Attending: EMERGENCY MEDICINE
Payer: MEDICAID

## 2020-02-27 ENCOUNTER — APPOINTMENT (OUTPATIENT)
Dept: GENERAL RADIOLOGY | Facility: HOSPITAL | Age: 30
End: 2020-02-27
Attending: EMERGENCY MEDICINE
Payer: MEDICAID

## 2020-02-27 VITALS
OXYGEN SATURATION: 99 % | BODY MASS INDEX: 24.75 KG/M2 | TEMPERATURE: 98 F | DIASTOLIC BLOOD PRESSURE: 76 MMHG | HEART RATE: 80 BPM | WEIGHT: 154 LBS | RESPIRATION RATE: 16 BRPM | HEIGHT: 66 IN | SYSTOLIC BLOOD PRESSURE: 120 MMHG

## 2020-02-27 DIAGNOSIS — R05.9 COUGH: Primary | ICD-10-CM

## 2020-02-27 DIAGNOSIS — R53.83 FATIGUE, UNSPECIFIED TYPE: ICD-10-CM

## 2020-02-27 DIAGNOSIS — M54.2 NECK PAIN: ICD-10-CM

## 2020-02-27 LAB
ANION GAP SERPL CALC-SCNC: 6 MMOL/L (ref 0–18)
B-HCG UR QL: NEGATIVE
BASOPHILS # BLD AUTO: 0.03 X10(3) UL (ref 0–0.2)
BASOPHILS NFR BLD AUTO: 0.4 %
BILIRUB UR QL: NEGATIVE
BUN BLD-MCNC: 16 MG/DL (ref 7–18)
BUN/CREAT SERPL: 18.2 (ref 10–20)
CALCIUM BLD-MCNC: 9.3 MG/DL (ref 8.5–10.1)
CHLORIDE SERPL-SCNC: 109 MMOL/L (ref 98–112)
CLARITY UR: CLEAR
CO2 SERPL-SCNC: 25 MMOL/L (ref 21–32)
COLOR UR: YELLOW
CREAT BLD-MCNC: 0.88 MG/DL (ref 0.55–1.02)
DEPRECATED RDW RBC AUTO: 41.3 FL (ref 35.1–46.3)
EOSINOPHIL # BLD AUTO: 0.16 X10(3) UL (ref 0–0.7)
EOSINOPHIL NFR BLD AUTO: 2 %
ERYTHROCYTE [DISTWIDTH] IN BLOOD BY AUTOMATED COUNT: 12.5 % (ref 11–15)
GLUCOSE BLD-MCNC: 115 MG/DL (ref 70–99)
GLUCOSE UR-MCNC: NEGATIVE MG/DL
HCT VFR BLD AUTO: 41.7 % (ref 35–48)
HGB BLD-MCNC: 14.1 G/DL (ref 12–16)
HGB UR QL STRIP.AUTO: NEGATIVE
IMM GRANULOCYTES # BLD AUTO: 0.02 X10(3) UL (ref 0–1)
IMM GRANULOCYTES NFR BLD: 0.3 %
KETONES UR-MCNC: NEGATIVE MG/DL
LEUKOCYTE ESTERASE UR QL STRIP.AUTO: NEGATIVE
LYMPHOCYTES # BLD AUTO: 2.02 X10(3) UL (ref 1–4)
LYMPHOCYTES NFR BLD AUTO: 25.3 %
MCH RBC QN AUTO: 30.5 PG (ref 26–34)
MCHC RBC AUTO-ENTMCNC: 33.8 G/DL (ref 31–37)
MCV RBC AUTO: 90.1 FL (ref 80–100)
MONOCYTES # BLD AUTO: 0.42 X10(3) UL (ref 0.1–1)
MONOCYTES NFR BLD AUTO: 5.3 %
NEUTROPHILS # BLD AUTO: 5.33 X10 (3) UL (ref 1.5–7.7)
NEUTROPHILS # BLD AUTO: 5.33 X10(3) UL (ref 1.5–7.7)
NEUTROPHILS NFR BLD AUTO: 66.7 %
NITRITE UR QL STRIP.AUTO: NEGATIVE
OSMOLALITY SERPL CALC.SUM OF ELEC: 292 MOSM/KG (ref 275–295)
PH UR: 5 [PH] (ref 5–8)
PLATELET # BLD AUTO: 322 10(3)UL (ref 150–450)
POTASSIUM SERPL-SCNC: 3.8 MMOL/L (ref 3.5–5.1)
PROT UR-MCNC: NEGATIVE MG/DL
RBC # BLD AUTO: 4.63 X10(6)UL (ref 3.8–5.3)
SODIUM SERPL-SCNC: 140 MMOL/L (ref 136–145)
SP GR UR STRIP: 1.02 (ref 1–1.03)
UROBILINOGEN UR STRIP-ACNC: <2
WBC # BLD AUTO: 8 X10(3) UL (ref 4–11)

## 2020-02-27 PROCEDURE — 96361 HYDRATE IV INFUSION ADD-ON: CPT

## 2020-02-27 PROCEDURE — 85025 COMPLETE CBC W/AUTO DIFF WBC: CPT | Performed by: EMERGENCY MEDICINE

## 2020-02-27 PROCEDURE — 81025 URINE PREGNANCY TEST: CPT

## 2020-02-27 PROCEDURE — 81003 URINALYSIS AUTO W/O SCOPE: CPT | Performed by: EMERGENCY MEDICINE

## 2020-02-27 PROCEDURE — 99284 EMERGENCY DEPT VISIT MOD MDM: CPT

## 2020-02-27 PROCEDURE — 80048 BASIC METABOLIC PNL TOTAL CA: CPT | Performed by: EMERGENCY MEDICINE

## 2020-02-27 PROCEDURE — 96374 THER/PROPH/DIAG INJ IV PUSH: CPT

## 2020-02-27 RX ORDER — LORAZEPAM 2 MG/ML
0.5 INJECTION INTRAMUSCULAR ONCE
Status: COMPLETED | OUTPATIENT
Start: 2020-02-27 | End: 2020-02-27

## 2020-02-27 RX ORDER — ACETAMINOPHEN 500 MG
1000 TABLET ORAL ONCE
Status: COMPLETED | OUTPATIENT
Start: 2020-02-27 | End: 2020-02-27

## 2020-02-27 NOTE — TELEPHONE ENCOUNTER
From: Marii Bonilla  To: Magdalena Lozano MD  Sent: 2/26/2020 7:09 PM CST  Subject: Visit Follow-up Question    I am still having rash on face and neck. Currently taking 2 xyzal in am and 2 at night, as well as 2 hydroxyzine in am and 2 at night.

## 2020-02-27 NOTE — TELEPHONE ENCOUNTER
LMTCB on pt's mobile voicemail asking that she call and ask to speak with an RN in order to be triaged due to c/o rash on face/neck.

## 2020-02-27 NOTE — ED NOTES
Pt informed this ER Tech that she doesn't want tylenol nor an IV.  Pt was being updated on test that would take place next

## 2020-02-27 NOTE — ED PROVIDER NOTES
Patient Seen in: [facility] Emergency Department    History   Patient presents with:  Cough/URI    Stated Complaint:     HPI    Patient presents to the emergency department today for evaluation of cough that started this morning as well as having External Cream,  Apply 1 Application topically 2 (two) times daily. Patient not taking: Reported on 2/13/2020    triamcinolone acetonide 0.1 % External Cream,  Apply topically 2 (two) times daily.  Apply bid  Patient not taking: Reported on 2/13/2020    Cl 02/27/20 0910 Oral   SpO2 02/27/20 0900 99 %   O2 Device 02/27/20 0910 None (Room air)       Current:/88   Pulse 82   Temp 98 °F (36.7 °C) (Oral)   Resp 20   Ht 167.6 cm (5' 6\")   Wt 69.9 kg   LMP 02/07/2020   SpO2 100%   BMI 24.86 kg/m²         Phy I after discussion with her she is frustrated because there is no clear cause for her ongoing sense that she just does not feel well fatigue muscle aches.   I recommended seeing a rheumatologist but she states she actually has an appointment already set up 21     In 1 week  For re-check          See the rheumatologist as planned on Monday      Medications Prescribed:  Current Discharge Medication List

## 2020-02-27 NOTE — ED INITIAL ASSESSMENT (HPI)
Pt states that she was treated for scarlet fever last week. Today she has a cough and a stiff neck. Pt was advised to come in. Mask given in triage.  Pt reports possible fever at home pt denies any recent travels

## 2020-02-27 NOTE — TELEPHONE ENCOUNTER
Call reviewed and noted. Agree with triage advice provided given her other symptoms outside of the rash at this time seem more concerning. Patient has follow-up appointment with me next week regarding the rash.   Patient has also seen dermatology within t

## 2020-02-27 NOTE — ED NOTES
Pt with scarlet fever one week ago. Denies having throat pain at the time. Throat red in color however no pain per pt. Lungs clear on exam. Pt describes left neck pain and intermittent HA that began this AM. Denies taking any meds for the pain.  PPE worn fo

## 2020-02-28 RX ORDER — CYCLOBENZAPRINE HCL 5 MG
TABLET ORAL
Qty: 30 TABLET | Refills: 0 | OUTPATIENT
Start: 2020-02-28

## 2020-02-28 RX ORDER — CYCLOBENZAPRINE HCL 5 MG
TABLET ORAL
Qty: 30 TABLET | Refills: 0 | Status: SHIPPED | OUTPATIENT
Start: 2020-02-28 | End: 2020-03-16

## 2020-03-02 ENCOUNTER — APPOINTMENT (OUTPATIENT)
Dept: LAB | Age: 30
End: 2020-03-02
Attending: INTERNAL MEDICINE
Payer: MEDICAID

## 2020-03-02 ENCOUNTER — TELEPHONE (OUTPATIENT)
Dept: FAMILY MEDICINE CLINIC | Facility: CLINIC | Age: 30
End: 2020-03-02

## 2020-03-02 ENCOUNTER — OFFICE VISIT (OUTPATIENT)
Dept: RHEUMATOLOGY | Facility: CLINIC | Age: 30
End: 2020-03-02
Payer: MEDICAID

## 2020-03-02 VITALS
DIASTOLIC BLOOD PRESSURE: 72 MMHG | SYSTOLIC BLOOD PRESSURE: 118 MMHG | BODY MASS INDEX: 24.75 KG/M2 | WEIGHT: 154 LBS | HEIGHT: 66 IN | HEART RATE: 100 BPM

## 2020-03-02 DIAGNOSIS — M54.2 NECK PAIN: ICD-10-CM

## 2020-03-02 DIAGNOSIS — M25.50 POLYARTHRALGIA: Primary | ICD-10-CM

## 2020-03-02 DIAGNOSIS — M25.50 POLYARTHRALGIA: ICD-10-CM

## 2020-03-02 LAB — ERYTHROCYTE [SEDIMENTATION RATE] IN BLOOD: 12 MM/HR (ref 0–20)

## 2020-03-02 PROCEDURE — 86140 C-REACTIVE PROTEIN: CPT | Performed by: INTERNAL MEDICINE

## 2020-03-02 PROCEDURE — 86160 COMPLEMENT ANTIGEN: CPT

## 2020-03-02 PROCEDURE — 86160 COMPLEMENT ANTIGEN: CPT | Performed by: INTERNAL MEDICINE

## 2020-03-02 PROCEDURE — 86161 COMPLEMENT/FUNCTION ACTIVITY: CPT

## 2020-03-02 PROCEDURE — 99214 OFFICE O/P EST MOD 30 MIN: CPT | Performed by: INTERNAL MEDICINE

## 2020-03-02 PROCEDURE — 36415 COLL VENOUS BLD VENIPUNCTURE: CPT | Performed by: INTERNAL MEDICINE

## 2020-03-02 PROCEDURE — 85652 RBC SED RATE AUTOMATED: CPT | Performed by: INTERNAL MEDICINE

## 2020-03-02 PROCEDURE — 86038 ANTINUCLEAR ANTIBODIES: CPT

## 2020-03-02 NOTE — TELEPHONE ENCOUNTER
Follow-up call after urgent care/emergency room visit. Patient to make an appointment for routine care sometime in the next 3 months. Patient to call if she is not much better.

## 2020-03-02 NOTE — PROGRESS NOTES
Bill Ulloa is a 30year old female who presents for Patient presents with:  Consult  Joint Pain: hips,knee, wrist, elbow, shoulder. Rash: face,neck,chest, back, knees, legs  Neck Pain  .    HPI:   CC: rash and joint pain  Consult: referred by PCP  Encounters:  03/02/20 : 154 lb (69.9 kg)  02/27/20 : 154 lb (69.9 kg)    Body mass index is 24.86 kg/m².       Current Outpatient Medications   Medication Sig Dispense Refill   • CYCLOBENZAPRINE 5 MG Oral Tab TAKE 1 TABLET(5 MG) BY MOUTH THREE TIMES DAILY A RELIEF) 25 MG Oral Cap Take 25 mg by mouth nightly as needed for Itching.         Past Medical History:   Diagnosis Date   • ADD (attention deficit disorder)    • Anxiety     not a current a issue   • Depression     not an issue   • Immunocompromised ([address] pulses. LUNGS: CTAB, no increased work of breathing  ABDOMEN:  soft NT/ND, +BS, no HSM  SKIN: No rashes or skin lesions.  No nail findings  MSK:  TTP in paraspinal region and shoulders  Cervical spine: FROM  Hands: no synovitis in DIP, PIP and MCP, strong 0.03   Immature Granulocyte Absolute      0.00 - 1.00 x10(3) uL 0.02   Neutrophils %      % 66.7   Lymphocytes %      % 25.3   Monocytes %      % 5.3   Eosinophils %      % 2.0   Basophils %      % 0.4   Immature Granulocyte %      % 0.3   Glucose      68 had work-up in 2018 which showed negative DEVORA, ESR, RF and CRP and autoimmune process was ruled out. She currently states that her neck and shoulder pain has worsened. She also reports that she continues to have urticaria.   - Plan to repeat DEVORA, ESR, CRP

## 2020-03-02 NOTE — PATIENT INSTRUCTIONS
You were seen today for joint pain, muscle pain and rash  Lets get get some more blood work to evaluate for anything autoimmune   Will get results in 2 weeks

## 2020-03-03 ENCOUNTER — OFFICE VISIT (OUTPATIENT)
Dept: FAMILY MEDICINE CLINIC | Facility: CLINIC | Age: 30
End: 2020-03-03
Payer: MEDICAID

## 2020-03-03 ENCOUNTER — OFFICE VISIT (OUTPATIENT)
Dept: ORTHOPEDICS CLINIC | Facility: CLINIC | Age: 30
End: 2020-03-03
Payer: MEDICAID

## 2020-03-03 ENCOUNTER — OFFICE VISIT (OUTPATIENT)
Dept: ALLERGY | Facility: CLINIC | Age: 30
End: 2020-03-03
Payer: MEDICAID

## 2020-03-03 VITALS
SYSTOLIC BLOOD PRESSURE: 124 MMHG | HEIGHT: 66 IN | WEIGHT: 151 LBS | TEMPERATURE: 98 F | DIASTOLIC BLOOD PRESSURE: 82 MMHG | BODY MASS INDEX: 24.27 KG/M2 | OXYGEN SATURATION: 98 % | HEART RATE: 93 BPM

## 2020-03-03 VITALS
TEMPERATURE: 98 F | HEART RATE: 97 BPM | DIASTOLIC BLOOD PRESSURE: 87 MMHG | RESPIRATION RATE: 18 BRPM | SYSTOLIC BLOOD PRESSURE: 137 MMHG | OXYGEN SATURATION: 100 %

## 2020-03-03 DIAGNOSIS — L50.1 CHRONIC IDIOPATHIC URTICARIA: Primary | ICD-10-CM

## 2020-03-03 DIAGNOSIS — S62.154A CLOSED NONDISPLACED FRACTURE OF HOOK PROCESS OF HAMATE OF RIGHT WRIST, INITIAL ENCOUNTER: Primary | ICD-10-CM

## 2020-03-03 DIAGNOSIS — J45.20 MILD INTERMITTENT ASTHMATIC BRONCHITIS WITHOUT COMPLICATION: Primary | ICD-10-CM

## 2020-03-03 LAB
C3 SERPL-MCNC: 132 MG/DL (ref 90–180)
C4 SERPL-MCNC: 28.6 MG/DL (ref 10–40)
CRP SERPL-MCNC: <0.29 MG/DL (ref ?–0.3)

## 2020-03-03 PROCEDURE — 99243 OFF/OP CNSLTJ NEW/EST LOW 30: CPT | Performed by: ORTHOPAEDIC SURGERY

## 2020-03-03 PROCEDURE — 99214 OFFICE O/P EST MOD 30 MIN: CPT | Performed by: ALLERGY & IMMUNOLOGY

## 2020-03-03 PROCEDURE — 25630 CLTX CARPL FX W/O MNPJ EA B1: CPT | Performed by: ORTHOPAEDIC SURGERY

## 2020-03-03 PROCEDURE — 99213 OFFICE O/P EST LOW 20 MIN: CPT | Performed by: FAMILY MEDICINE

## 2020-03-03 RX ORDER — ALBUTEROL SULFATE 90 UG/1
2 AEROSOL, METERED RESPIRATORY (INHALATION) EVERY 4 HOURS PRN
Qty: 1 INHALER | Refills: 1 | Status: SHIPPED | OUTPATIENT
Start: 2020-03-03 | End: 2020-09-21

## 2020-03-03 NOTE — PROGRESS NOTES
Monae Walker is a 30year old female. HPI:   No chief complaint on file. Patient is a 61-year-old female who presents for follow-up with a chief complaint of hives    Patient last seen by me on February 13, 2020.   Patient has a history of chron Smokeless tobacco: Never Used      Tobacco comment: 1 cigarette a month    Alcohol use:  Yes      Alcohol/week: 0.0 standard drinks      Comment: rarely    Drug use: Yes      Types: Cannabis       Medications (Active prior to today's visit):  Current Outpat Constitutional: responsive, no acute distress noted  Head/Face: NC/Atraumatic  Eyes/Vision: conjunctiva and lids are normal extraocular motion is intact   Ears/Audiometry: tympanic membranes are normal bilaterally hearing is grossly intact  Nose/Mouth/Th today, they were provided solely for patient education and training related to self administration of these medications. Teaching, instruction and sample was provided to the patient by myself.   Teaching included  a review of potential adverse side effects
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
## 2020-03-03 NOTE — PROGRESS NOTES
HPI:    Patient ID: Betty Rodriguez is a 30year old female who presents for approximately 1 week history of cough. Illness started about 7 to 10 days ago with sore throat that lasted 24 hours. Then she started to cough.   The cough is dry and \"asthma-l file        Active member of club or organization: Not on file        Attends meetings of clubs or organizations: Not on file        Relationship status: Not on file      Intimate partner violence:        Fear of current or ex partner: Not on file        E HIVES  Sertraline              HIVES  Sumatriptan             PALPITATIONS  Amitriptyline           UNKNOWN  Lexapro [Escitalopr*    OTHER (SEE COMMENTS)    Comment:Headache  Topiramate              OTHER (SEE COMMENTS)    Comment:Confusion and numbness  A

## 2020-03-03 NOTE — PATIENT INSTRUCTIONS
Recs: Continue with Xyzal 5 mg 4 times per day, Pepcid 20 mg twice a day. If refractory to above recommendations may add Benadryl 25 mg every 4-6 hours as needed  Reviewed potential treatment option of Xolair if refractory to above recommendations.   Maria L

## 2020-03-03 NOTE — PATIENT INSTRUCTIONS
Use albuterol inhaler, 2 inhalations every 4 hours as needed for cough. Use before cold air exposure and exercise.     Guaifenesin long-acting 600 mg take twice daily, mucinex is the brand name, drink lots of fluids including tea with lemon and honey also

## 2020-03-05 LAB — NUCLEAR IGG TITR SER IF: NEGATIVE {TITER}

## 2020-03-06 ENCOUNTER — PATIENT MESSAGE (OUTPATIENT)
Dept: FAMILY MEDICINE CLINIC | Facility: CLINIC | Age: 30
End: 2020-03-06

## 2020-03-06 ENCOUNTER — PATIENT MESSAGE (OUTPATIENT)
Dept: ALLERGY | Facility: CLINIC | Age: 30
End: 2020-03-06

## 2020-03-06 LAB
C-1-ESTERASE INHIBITOR, FUNCTI: 96 %
C-1-ESTERASE INHIBITOR: 31 MG/DL
COMPLEMENT COMPONENT 4: 29 MG/DL

## 2020-03-06 NOTE — TELEPHONE ENCOUNTER
RN calling patient to triage hives in regards to her MyChart message:    She reports that she took Clonazepam last night and woke up with hives to her face and chest this morning. Reports hives look similar to how they appeared recently.   She thinks Clona thinks this may be what has been causing her hives. Routed to Dr. Vern Miller for review.

## 2020-03-06 NOTE — TELEPHONE ENCOUNTER
Clonazepam added to list of medication allergies in patient's chart per Dr. Alyssa Weller recommendations.

## 2020-03-06 NOTE — TELEPHONE ENCOUNTER
From: Astrid Iqbal  To: Vu Pereira MD  Sent: 3/6/2020 8:58 AM CST  Subject: Visit Follow-up Question    Can you please add clonazepam to my list of allergies? It gives me hives/rash. I've been clear until I took . 25 last night to sleep and wo

## 2020-03-10 ENCOUNTER — TELEPHONE (OUTPATIENT)
Dept: ORTHOPEDICS CLINIC | Facility: CLINIC | Age: 30
End: 2020-03-10

## 2020-03-10 ENCOUNTER — OFFICE VISIT (OUTPATIENT)
Dept: FAMILY MEDICINE CLINIC | Facility: CLINIC | Age: 30
End: 2020-03-10
Payer: MEDICAID

## 2020-03-10 VITALS
WEIGHT: 146 LBS | BODY MASS INDEX: 23.46 KG/M2 | SYSTOLIC BLOOD PRESSURE: 120 MMHG | HEART RATE: 91 BPM | HEIGHT: 66 IN | TEMPERATURE: 98 F | DIASTOLIC BLOOD PRESSURE: 74 MMHG | OXYGEN SATURATION: 99 %

## 2020-03-10 DIAGNOSIS — G89.29 OTHER CHRONIC PAIN: Primary | ICD-10-CM

## 2020-03-10 PROCEDURE — 99213 OFFICE O/P EST LOW 20 MIN: CPT | Performed by: FAMILY MEDICINE

## 2020-03-10 NOTE — PROGRESS NOTES
HPI:    Patient ID: Isaura Tucker is a 30year old female who presents for medical cannabis paperwork. HPI  Here for medical cannabis paperwork. Would like it mostly for chronic pain.  States she does have many conditions that qualify for medical ma taking.   Penicillins             HIVES  Sertraline              HIVES  Sumatriptan             PALPITATIONS  Amitriptyline           UNKNOWN  Lexapro [Escitalopr*    OTHER (SEE COMMENTS)    Comment:Headache  Topiramate              OTHER (SEE COMMENTS) No prescriptions requested or ordered in this encounter       Imaging & Referrals:  None    A total of 20 minutes were spent face to face with patient, and >50% was spent on counseling and coordination of care.        Chetan Seals DO  NT#[id number]

## 2020-03-10 NOTE — TELEPHONE ENCOUNTER
S/w pt and she states she did go to the ER and had cast removed and put in temp cast. She states she purchased a sling to immobilize d/t her shoulder and elbow discomfort. She states she still has pain 7/10.   I s/w Dr. Georgina Bains and he would like to see pt o

## 2020-03-16 ENCOUNTER — OFFICE VISIT (OUTPATIENT)
Dept: ORTHOPEDICS CLINIC | Facility: CLINIC | Age: 30
End: 2020-03-16
Payer: MEDICAID

## 2020-03-16 DIAGNOSIS — S62.144D CLOSED NONDISPLACED FRACTURE OF BODY OF HAMATE OF RIGHT WRIST WITH ROUTINE HEALING, SUBSEQUENT ENCOUNTER: Primary | ICD-10-CM

## 2020-03-16 DIAGNOSIS — M25.511 ACUTE PAIN OF RIGHT SHOULDER: ICD-10-CM

## 2020-03-16 PROCEDURE — 99024 POSTOP FOLLOW-UP VISIT: CPT | Performed by: ORTHOPAEDIC SURGERY

## 2020-03-17 RX ORDER — LEVOCETIRIZINE DIHYDROCHLORIDE 5 MG/1
TABLET, FILM COATED ORAL
Qty: 120 TABLET | Refills: 0 | Status: SHIPPED | OUTPATIENT
Start: 2020-03-17 | End: 2020-09-21

## 2020-03-17 NOTE — TELEPHONE ENCOUNTER
Rx sent.   If Xyzal is not covered by her insurance up to 4 times per day then she may need to buy additional Xyzal over-the-counter

## 2020-03-17 NOTE — PROGRESS NOTES
NURSING INTAKE COMMENTS: Patient presents with:  Fracture: right wrist- pt went to ER on 3/4 d/t cast feeling too tight and they removed. HPI: This 30year old female presents today with complaints of right wrist pain.   Patient was last seen about 2 PALPITATIONS  Amitriptyline           UNKNOWN  Lexapro [Escitalopr*    OTHER (SEE COMMENTS)    Comment:Headache  Topiramate              OTHER (SEE COMMENTS)    Comment:Confusion and numbness  Adhesive Tape           RASH  Gabapentin              RASH  Fam Right upper extremity exam demonstrates short arm splint in appropriate position without any skin breakdown around the edges. Patient has full active flexion and extension of the digits without pain.   Right shoulder exam demonstrates full active range of prevertebral soft tissues. CERVICAL DISC LEVELS: C2-C3: No significant disc/facet abnormality, spinal stenosis, or foraminal stenosis. C3-C4: Minimal posterior disc-osteophyte complex formation.   No evidence of significant spinal canal or neural foramina roots have normal caliber, contour, and signal intensity. PARASPINAL AREA: No visible mass. OTHER: Small 1.4 cm left interpolar renal cyst.  Circumaortic left renal vein.   LUMBAR DISC LEVELS: L1-L2: No significant disc/facet abnormality, spinal stenosis, 1. 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 2. A tiny ganglion cyst dorsal to the scapholunate interspace.

## 2020-03-17 NOTE — TELEPHONE ENCOUNTER
Refill requested for:   Name from pharmacy: LEVOCETIRIZINE 5MG TABLETS         Will file in chart as: LEVOCETIRIZINE DIHYDROCHLORIDE 5 MG Oral Tab         Sig: TAKE 1 TABLET(5 MG) BY MOUTH FOUR TIMES DAILY    Disp:  120 tablet    Refills:  0 (Pharmacy requ

## 2020-03-17 NOTE — TELEPHONE ENCOUNTER
[employer] message sent to patient to notify her of Dr. Nava Meraz recommendations listed below.

## 2020-04-14 ENCOUNTER — TELEPHONE (OUTPATIENT)
Dept: ORTHOPEDICS CLINIC | Facility: CLINIC | Age: 30
End: 2020-04-14

## 2020-04-14 NOTE — TELEPHONE ENCOUNTER
Attempted 2 calls to reach patient for telephone appointment at 9 AM today. I left the patient a voicemail instructing her to call the clinic if she has any questions or concerns, but was unable to reach her for the appointment.

## 2020-09-21 RX ORDER — LEVOCETIRIZINE DIHYDROCHLORIDE 5 MG/1
5 TABLET, FILM COATED ORAL 4 TIMES DAILY
Qty: 120 TABLET | Refills: 0 | Status: SHIPPED | OUTPATIENT
Start: 2020-09-21 | End: 2020-10-05

## 2020-09-21 RX ORDER — ALBUTEROL SULFATE 90 UG/1
2 AEROSOL, METERED RESPIRATORY (INHALATION) EVERY 4 HOURS PRN
Qty: 1 INHALER | Refills: 1 | Status: SHIPPED | OUTPATIENT
Start: 2020-09-21 | End: 2020-10-16

## 2020-09-21 RX ORDER — ACETAMINOPHEN AND CODEINE PHOSPHATE 120; 12 MG/5ML; MG/5ML
SOLUTION ORAL
Qty: 28 TABLET | Refills: 0 | OUTPATIENT
Start: 2020-09-21

## 2020-09-21 RX ORDER — GABAPENTIN 100 MG/1
CAPSULE ORAL
Qty: 90 CAPSULE | Refills: 0 | OUTPATIENT
Start: 2020-09-21

## 2020-09-21 RX ORDER — FLUCONAZOLE 150 MG/1
TABLET ORAL
Qty: 3 TABLET | Refills: 0 | OUTPATIENT
Start: 2020-09-21

## 2020-09-21 RX ORDER — CYCLOBENZAPRINE HCL 5 MG
TABLET ORAL
Qty: 30 TABLET | Refills: 0 | OUTPATIENT
Start: 2020-09-21

## 2020-09-21 RX ORDER — VALACYCLOVIR HYDROCHLORIDE 500 MG/1
TABLET, FILM COATED ORAL
Qty: 30 TABLET | Refills: 0 | Status: SHIPPED | OUTPATIENT
Start: 2020-09-21 | End: 2020-10-05

## 2020-09-21 NOTE — TELEPHONE ENCOUNTER
Xyzal refilled x1 month at 4 times per day. Please call to schedule 6-month follow-up.   Patient will need follow-up appointment for further refills

## 2020-09-21 NOTE — TELEPHONE ENCOUNTER
Refill requested for    Name from pharmacy: LEVOCETIRIZINE 5MG TABLETS         Will file in chart as: LEVOCETIRIZINE DIHYDROCHLORIDE 5 MG Oral Tab    Sig: TAKE 1 TABLET(5 MG) BY MOUTH FOUR TIMES DAILY    Disp:  120 tablet    Refills:  0 (Pharmacy requested

## 2020-09-21 NOTE — TELEPHONE ENCOUNTER
RN called patient to notify her of RX refill and need for follow up. Follow up scheduled for next Wednesday 9/30.

## 2020-09-22 RX ORDER — FAMOTIDINE 40 MG/1
TABLET, FILM COATED ORAL
Qty: 60 TABLET | Refills: 5 | Status: SHIPPED | OUTPATIENT
Start: 2020-09-22 | End: 2020-10-05

## 2020-09-25 ENCOUNTER — TELEPHONE (OUTPATIENT)
Dept: FAMILY MEDICINE CLINIC | Facility: CLINIC | Age: 30
End: 2020-09-25

## 2020-09-25 NOTE — TELEPHONE ENCOUNTER
Pt scheduled with [facility] on 10/2/2020, added to wait list per pt's request. Pt verbalized understanding and agrees with POC.     Jacqueline Bustillos, DO [place] [address] Dr. Kingsley Nuñez 5 minutes ago (12:52 PM)     Yes, she needs an appointment to discuss & to complete any

## 2020-09-25 NOTE — TELEPHONE ENCOUNTER
Patient calling in regard to getting recertification on her medical Cannabis card will like to discuss

## 2020-09-29 ENCOUNTER — TELEPHONE (OUTPATIENT)
Dept: FAMILY MEDICINE CLINIC | Facility: CLINIC | Age: 30
End: 2020-09-29

## 2020-09-29 NOTE — TELEPHONE ENCOUNTER
Pt did not have any questions. Advised pt to keep appmnt on 10/5/2020 to establish care with EB and for OCP discussion. Pt verbalized understanding and agrees with POC.

## 2020-09-29 NOTE — TELEPHONE ENCOUNTER
Pt needs refill on her birth control she was supposed to come in 9/30 but she started period   She states her birth control isn't helping her and she wants to try a different one and would like to speak to someone   Please call back

## 2020-09-30 ENCOUNTER — OFFICE VISIT (OUTPATIENT)
Dept: ALLERGY | Facility: CLINIC | Age: 30
End: 2020-09-30
Payer: MEDICAID

## 2020-09-30 VITALS
HEIGHT: 66 IN | SYSTOLIC BLOOD PRESSURE: 116 MMHG | WEIGHT: 146 LBS | HEART RATE: 87 BPM | BODY MASS INDEX: 23.46 KG/M2 | OXYGEN SATURATION: 97 % | TEMPERATURE: 97 F | DIASTOLIC BLOOD PRESSURE: 77 MMHG

## 2020-09-30 DIAGNOSIS — J45.909 EXTRINSIC ASTHMA WITHOUT COMPLICATION, UNSPECIFIED ASTHMA SEVERITY, UNSPECIFIED WHETHER PERSISTENT: ICD-10-CM

## 2020-09-30 DIAGNOSIS — L50.1 CHRONIC IDIOPATHIC URTICARIA: Primary | ICD-10-CM

## 2020-09-30 PROCEDURE — 99214 OFFICE O/P EST MOD 30 MIN: CPT | Performed by: ALLERGY & IMMUNOLOGY

## 2020-09-30 PROCEDURE — 3074F SYST BP LT 130 MM HG: CPT | Performed by: ALLERGY & IMMUNOLOGY

## 2020-09-30 PROCEDURE — 3078F DIAST BP <80 MM HG: CPT | Performed by: ALLERGY & IMMUNOLOGY

## 2020-09-30 PROCEDURE — 3008F BODY MASS INDEX DOCD: CPT | Performed by: ALLERGY & IMMUNOLOGY

## 2020-09-30 RX ORDER — FLUTICASONE PROPIONATE AND SALMETEROL 100; 50 UG/1; UG/1
1 POWDER RESPIRATORY (INHALATION) 2 TIMES DAILY
Qty: 1 EACH | Refills: 0 | Status: SHIPPED | OUTPATIENT
Start: 2020-09-30 | End: 2020-11-02

## 2020-09-30 RX ORDER — LEVOCETIRIZINE DIHYDROCHLORIDE 5 MG/1
5 TABLET, FILM COATED ORAL EVERY 12 HOURS PRN
Qty: 60 TABLET | Refills: 5 | Status: SHIPPED | OUTPATIENT
Start: 2020-09-30 | End: 2021-08-11

## 2020-09-30 RX ORDER — FAMOTIDINE 20 MG/1
20 TABLET ORAL 2 TIMES DAILY
Qty: 60 TABLET | Refills: 5 | Status: SHIPPED | OUTPATIENT
Start: 2020-09-30 | End: 2021-08-11

## 2020-09-30 NOTE — PROGRESS NOTES
Joselyn Torres is a 26year old female.     HPI:   Patient presents with:  Hives: per patient follow up for hives, still has hives on upper chest , would like possible allergy testing as patient is interested in Allergy shots  Asthma: per patient is using month    Alcohol use:  Yes      Alcohol/week: 0.0 standard drinks      Comment: rarely    Drug use: Yes      Types: Cannabis       Medications (Active prior to today's visit):  Current Outpatient Medications   Medication Sig Dispense Refill   • Albuterol Diallo Respiratory:  Negative for cough, dyspnea and wheezing    PHYSICAL EXAM:   Constitutional: responsive, no acute distress noted  Head/Face: NC/Atraumatic  Eyes/Vision: conjunctiva and lids are normal extraocular motion is intact   Ears/Audiometry: tympani encounter.       Meds This Visit:  Requested Prescriptions      No prescriptions requested or ordered in this encounter       Imaging & Referrals:  None     9/30/2020  Jacek Callahan MD    If medication samples were provided today, they were provided sol

## 2020-09-30 NOTE — PATIENT INSTRUCTIONS
1. CIU  Continue with Xyzal 5 mg twice a day. May titrate up to 4 times per day if needed  Continue with Pepcid 20 mg twice a day  Application for Xolair provided. Patient to return with completed application for submission      2.  Asthma  Appears more p

## 2020-10-02 ENCOUNTER — OFFICE VISIT (OUTPATIENT)
Dept: FAMILY MEDICINE CLINIC | Facility: CLINIC | Age: 30
End: 2020-10-02
Payer: MEDICAID

## 2020-10-02 VITALS
HEIGHT: 66 IN | SYSTOLIC BLOOD PRESSURE: 100 MMHG | TEMPERATURE: 98 F | DIASTOLIC BLOOD PRESSURE: 80 MMHG | WEIGHT: 152 LBS | BODY MASS INDEX: 24.43 KG/M2 | OXYGEN SATURATION: 98 % | HEART RATE: 83 BPM

## 2020-10-02 DIAGNOSIS — G89.29 OTHER CHRONIC PAIN: Primary | ICD-10-CM

## 2020-10-02 PROCEDURE — 99213 OFFICE O/P EST LOW 20 MIN: CPT | Performed by: FAMILY MEDICINE

## 2020-10-02 PROCEDURE — 3008F BODY MASS INDEX DOCD: CPT | Performed by: FAMILY MEDICINE

## 2020-10-02 PROCEDURE — 3074F SYST BP LT 130 MM HG: CPT | Performed by: FAMILY MEDICINE

## 2020-10-02 PROCEDURE — 3079F DIAST BP 80-89 MM HG: CPT | Performed by: FAMILY MEDICINE

## 2020-10-02 NOTE — PROGRESS NOTES
HPI:    Patient ID: Isaura Tucker is a 26year old female who presents for medical cannabis paperwork. HPI  Here for medical cannabis paperwork. Would like it mostly for chronic pain.  States she does have many conditions that qualify for medical m (VENTOLIN HFA) 108 (90 Base) MCG/ACT Inhalation Aero Soln Inhale 2 puffs into the lungs every 4 (four) hours as needed for Wheezing.  1 Inhaler 1   • hydrOXYzine HCl 25 MG Oral Tab      • Norethindrone 0.35 MG Oral Tab      • amphetamine-dextroamphetamine ( cymbalta. -Medical cannabis form re-signed today and returned to patient. Pt aware of potential SEs of marijuana.   -Pain management referral previously generated.       Meds This Visit:  Requested Prescriptions      No prescriptions requested or ordered

## 2020-10-05 ENCOUNTER — LAB ENCOUNTER (OUTPATIENT)
Dept: LAB | Age: 30
End: 2020-10-05
Attending: OBSTETRICS & GYNECOLOGY
Payer: MEDICAID

## 2020-10-05 ENCOUNTER — PATIENT MESSAGE (OUTPATIENT)
Dept: ORTHOPEDICS CLINIC | Facility: CLINIC | Age: 30
End: 2020-10-05

## 2020-10-05 ENCOUNTER — OFFICE VISIT (OUTPATIENT)
Dept: OBGYN CLINIC | Facility: CLINIC | Age: 30
End: 2020-10-05
Payer: MEDICAID

## 2020-10-05 ENCOUNTER — TELEPHONE (OUTPATIENT)
Dept: ALLERGY | Facility: CLINIC | Age: 30
End: 2020-10-05

## 2020-10-05 VITALS
HEIGHT: 66 IN | WEIGHT: 143 LBS | BODY MASS INDEX: 22.98 KG/M2 | DIASTOLIC BLOOD PRESSURE: 70 MMHG | SYSTOLIC BLOOD PRESSURE: 108 MMHG

## 2020-10-05 DIAGNOSIS — Z01.419 WOMEN'S ANNUAL ROUTINE GYNECOLOGICAL EXAMINATION: ICD-10-CM

## 2020-10-05 DIAGNOSIS — Z01.419 WOMEN'S ANNUAL ROUTINE GYNECOLOGICAL EXAMINATION: Primary | ICD-10-CM

## 2020-10-05 DIAGNOSIS — L50.1 CHRONIC IDIOPATHIC URTICARIA: Primary | ICD-10-CM

## 2020-10-05 DIAGNOSIS — Z30.09 CONTRACEPTIVE EDUCATION: ICD-10-CM

## 2020-10-05 PROCEDURE — 87389 HIV-1 AG W/HIV-1&-2 AB AG IA: CPT

## 2020-10-05 PROCEDURE — 88175 CYTOPATH C/V AUTO FLUID REDO: CPT | Performed by: OBSTETRICS & GYNECOLOGY

## 2020-10-05 PROCEDURE — 3078F DIAST BP <80 MM HG: CPT | Performed by: OBSTETRICS & GYNECOLOGY

## 2020-10-05 PROCEDURE — 3008F BODY MASS INDEX DOCD: CPT | Performed by: OBSTETRICS & GYNECOLOGY

## 2020-10-05 PROCEDURE — 87624 HPV HI-RISK TYP POOLED RSLT: CPT | Performed by: OBSTETRICS & GYNECOLOGY

## 2020-10-05 PROCEDURE — 99385 PREV VISIT NEW AGE 18-39: CPT | Performed by: OBSTETRICS & GYNECOLOGY

## 2020-10-05 PROCEDURE — 36415 COLL VENOUS BLD VENIPUNCTURE: CPT

## 2020-10-05 PROCEDURE — 87491 CHLMYD TRACH DNA AMP PROBE: CPT | Performed by: OBSTETRICS & GYNECOLOGY

## 2020-10-05 PROCEDURE — 86780 TREPONEMA PALLIDUM: CPT

## 2020-10-05 PROCEDURE — 3074F SYST BP LT 130 MM HG: CPT | Performed by: OBSTETRICS & GYNECOLOGY

## 2020-10-05 PROCEDURE — 87340 HEPATITIS B SURFACE AG IA: CPT

## 2020-10-05 PROCEDURE — 87591 N.GONORRHOEAE DNA AMP PROB: CPT | Performed by: OBSTETRICS & GYNECOLOGY

## 2020-10-05 RX ORDER — VALACYCLOVIR HYDROCHLORIDE 1 G/1
TABLET, FILM COATED ORAL
Qty: 12 TABLET | Refills: 1 | Status: SHIPPED | OUTPATIENT
Start: 2020-10-05 | End: 2021-08-11

## 2020-10-05 RX ORDER — LEVONORGESTREL / ETHINYL ESTRADIOL AND ETHINYL ESTRADIOL 150-30(84)
1 KIT ORAL DAILY
Qty: 1 PACKAGE | Refills: 3 | Status: SHIPPED | OUTPATIENT
Start: 2020-10-05 | End: 2021-06-23

## 2020-10-05 RX ORDER — FLUTICASONE PROPIONATE 50 MCG
SPRAY, SUSPENSION (ML) NASAL
COMMUNITY
Start: 2020-05-15 | End: 2021-08-11

## 2020-10-05 RX ORDER — VALACYCLOVIR HYDROCHLORIDE 500 MG/1
TABLET, FILM COATED ORAL
Qty: 30 TABLET | Refills: 0 | Status: SHIPPED | OUTPATIENT
Start: 2020-10-05 | End: 2020-10-05

## 2020-10-05 NOTE — PROGRESS NOTES
NEW GYN H&P     10/5/2020  9:30 AM    Patient presents with:  New Patient: NEW/ANNUAL PAP  BIRTH CONTROL CONSULT   .    HPI: Patient is a 26year old  LMP 20 here to establish care - due for annual gyne exam, requesting STD scree and birth cont tablet 0       Past Medical History:   Diagnosis Date   • ADD (attention deficit disorder)    • Anxiety     not a current a issue   • Asthma    • Broken wrist     Right    • Depression     not an issue   • Human papilloma virus infection    • Immunocomprom complaints  Neurological: no complaints  Immunological: no complaints  Musculoskeletal:no complaints       /70   Ht 66\"   Wt 143 lb (64.9 kg)   LMP 09/27/2020 (Exact Date)   BMI 23.08 kg/m²     Exam:   GENERAL: well developed, well nourished, in no

## 2020-10-05 NOTE — TELEPHONE ENCOUNTER
Clinical portion of Xolair Prescriber Services From completed. Dr. Franklin Phipps given [place] Form to review and complete physician portion and sign.

## 2020-10-06 NOTE — TELEPHONE ENCOUNTER
Completed and signed Xolair [employer] form and Patient Consent Form faxed to [employer] at [phone]. Fax confirmation received. Await response from [employer].

## 2020-10-07 NOTE — TELEPHONE ENCOUNTER
Spoke with patient and offered her follow-up appointment for Monday, 10/12/2020 at 8:30 AM with Dr. Donna Rojas. Patient had no further concerns.

## 2020-10-07 NOTE — TELEPHONE ENCOUNTER
From: Claus Avila  To: Altagracia Holt MD  Sent: 10/5/2020 9:49 AM CDT  Subject: Other    Can I come in for another evaluation on my right wrist as everything got screwed up due to swelling and COVID.    [facility]

## 2020-10-09 NOTE — TELEPHONE ENCOUNTER
Response received from [employer] reporting that Benefit Investigation for Xolair has been completed. States .  . Ilene Perez primary care physician referral is required to see the specialist.     Prior authorization for Xolair is required, call [phone]

## 2020-10-12 ENCOUNTER — OFFICE VISIT (OUTPATIENT)
Dept: ORTHOPEDICS CLINIC | Facility: CLINIC | Age: 30
End: 2020-10-12
Payer: MEDICAID

## 2020-10-12 VITALS — WEIGHT: 145 LBS | HEIGHT: 67 IN | BODY MASS INDEX: 22.76 KG/M2

## 2020-10-12 DIAGNOSIS — S62.144D CLOSED NONDISPLACED FRACTURE OF BODY OF HAMATE OF RIGHT WRIST WITH ROUTINE HEALING, SUBSEQUENT ENCOUNTER: Primary | ICD-10-CM

## 2020-10-12 PROCEDURE — 3008F BODY MASS INDEX DOCD: CPT | Performed by: ORTHOPAEDIC SURGERY

## 2020-10-12 PROCEDURE — L3908 WHO COCK-UP NONMOLDE PRE OTS: HCPCS | Performed by: ORTHOPAEDIC SURGERY

## 2020-10-12 PROCEDURE — 99213 OFFICE O/P EST LOW 20 MIN: CPT | Performed by: ORTHOPAEDIC SURGERY

## 2020-10-12 NOTE — TELEPHONE ENCOUNTER
LMTCB on patient's mobile/home number voicemail. When patient returns call . . . Please inform patient that PA for Xolair was approved.       Patient's covered specialty pharmacy is [facility] and Xolair prescription has been se

## 2020-10-12 NOTE — PROGRESS NOTES
NURSING INTAKE COMMENTS: Patient presents with:  Wrist Pain: right wrist, still painful      HPI: This 26year old female presents today with complaints of right wrist pain, chronic. Patient had an injury to the wrist over a year ago.   MRI was consistent 10/12/2020 ) 12 tablet 1   • amphetamine-dextroamphetamine (ADDERALL) 20 MG Oral Tab Take 1 tablet (20 mg total) by mouth as needed.  (Patient not taking: Reported on 9/30/2020 ) 30 tablet 0       Penicillins             HIVES  Sertraline              HIVES grind test.    Imaging: No results found.          Lab Results   Component Value Date    WBC 8.0 02/27/2020    HGB 14.1 02/27/2020    .0 02/27/2020      Lab Results   Component Value Date     (H) 02/27/2020    BUN 16 02/27/2020    CREATSERUM 0

## 2020-10-12 NOTE — TELEPHONE ENCOUNTER
Patient's insurance [employer]) contacted at [phone], spoke with PA nurse, Chapin Florence. Abdulkadir HER completed over the telephone. Xolair PA approved.     PA number [id number]    Approval Dates: 10/12/2020 - 2/17/2021    Xolair [employer]

## 2020-10-12 NOTE — TELEPHONE ENCOUNTER
[facility] contacted at [phone], spoke with pharmacy service rep, Franklin Boone. Rep given Xolair PA approval information as below . . .     PA number [id number]     Approval Dates: 10/12/2020 - 2/17/2021

## 2020-10-12 NOTE — TELEPHONE ENCOUNTER
Dr. Juanjose Carroll, [employer] has sent Xolair prescription as below to [address] [place] [address] . Xolair prescription pended as phone in call for patient's records. Please review and sign prescription.

## 2020-10-13 NOTE — TELEPHONE ENCOUNTER
Spoke with patient, provided all details as per below in [employer] message.   Patient planning to call [employer] to set up the co-pay assistance program, then reach out to [address] to provide co-pay assistance details and personal

## 2020-10-15 NOTE — TELEPHONE ENCOUNTER
[facility] contacted at [phone], spoke with Keri Gutierrez, pharmacy service rep in physician services department.      Rep offers that insurance verification department is still in the process of verifying PA for Xolair

## 2020-10-16 RX ORDER — ALBUTEROL SULFATE 90 UG/1
AEROSOL, METERED RESPIRATORY (INHALATION)
Qty: 18 G | Refills: 0 | Status: SHIPPED | OUTPATIENT
Start: 2020-10-16

## 2020-10-16 NOTE — TELEPHONE ENCOUNTER
A refill request was received for:  Requested Prescriptions     Pending Prescriptions Disp Refills   • ALBUTEROL SULFATE  (90 Base) MCG/ACT Inhalation Aero Soln [Pharmacy Med Name: ALBUTEROL HFA INH(200 PUFFS)18GM] 18 g 0     Sig: INHALE 2 PUFFS INT

## 2020-10-17 ENCOUNTER — PATIENT MESSAGE (OUTPATIENT)
Dept: ORTHOPEDICS CLINIC | Facility: CLINIC | Age: 30
End: 2020-10-17

## 2020-10-19 RX ORDER — MELOXICAM 15 MG/1
15 TABLET ORAL DAILY
Qty: 30 TABLET | Refills: 0 | Status: SHIPPED | OUTPATIENT
Start: 2020-10-19 | End: 2021-08-11

## 2020-10-19 NOTE — TELEPHONE ENCOUNTER
I placed script for Mobic for patient to pick-up at the [facility] in [place].  She can schedule an appointment to see me this week or next for casting.

## 2020-10-20 NOTE — TELEPHONE ENCOUNTER
[facility] called at [phone], spoke with pharmacy service rep, Leatha Grossman.      Informed by pharmacy rep that patient's insurance (medicaid) is not contracted with [place] and patient will need to be charged for [employer]

## 2020-10-20 NOTE — TELEPHONE ENCOUNTER
LMTCB on patient's mobile voicemail. When patient returns call please explain below to patient.      Insurance demands [place] and [place] only due to her [employer] and patient must received Xolair injections in the [facility] in order to be cov

## 2020-10-21 ENCOUNTER — TELEPHONE (OUTPATIENT)
Dept: ALLERGY | Facility: CLINIC | Age: 30
End: 2020-10-21

## 2020-10-21 NOTE — TELEPHONE ENCOUNTER
Delle Aschoff from pharmacy stated they are unable to fill medication because they are not contracted with Medicaid. Please advise. Call back # [phone].     Omalizumab 150 MG/ML Subcutaneous Solution Prefilled Syringe 2 Syringe 5 10/12/2020    Sig:   Injec

## 2020-10-22 ENCOUNTER — TELEPHONE (OUTPATIENT)
Dept: ALLERGY | Facility: CLINIC | Age: 30
End: 2020-10-22

## 2020-10-22 NOTE — TELEPHONE ENCOUNTER
Per Noris Hammond, he is to know if would like to go to a specialty pharmacy ([facility]) or [address] [place] [address]. In regards to Xolair.

## 2020-10-22 NOTE — TELEPHONE ENCOUNTER
[employer] contacted at [phone]. Rep informed that patient will pursue coverage through [facility]. Rep informed patient's orders will be sent to [facility].      [facility] tax ID requested and reported to rep as     [id number]

## 2020-10-26 ENCOUNTER — TELEPHONE (OUTPATIENT)
Dept: FAMILY MEDICINE CLINIC | Facility: CLINIC | Age: 30
End: 2020-10-26

## 2020-10-26 DIAGNOSIS — Z20.822 EXPOSURE TO COVID-19 VIRUS: Primary | ICD-10-CM

## 2020-10-26 NOTE — TELEPHONE ENCOUNTER
Pt was exposed to covid19 on 10/20 and 10/22/2020. Not wearing masks nor socially distancing. Headache, nasal congestion, productive cough. Pt denies: fever, any other symptoms. Covid19 test ordered.  Advised pt to self-quarantine at least till results back

## 2020-10-26 NOTE — TELEPHONE ENCOUNTER
Xolair Prescription order, Xolair order set faxed to [facility] at [phone]. Fax confirmation received awaiting successful transmission. Awaiting patient's return call. When patient returns call, please report below . . .     [facility]sur

## 2020-10-26 NOTE — TELEPHONE ENCOUNTER
See 10/20/2020 note as below. Xolair Order Set Printed, Please Sign,     Please sign for fax the Xolair prescription as below.     [facility] called at [phone], spoke with pharmacy service rep, Magdaleno Mcpherson.      In

## 2020-10-27 ENCOUNTER — TELEPHONE (OUTPATIENT)
Dept: ALLERGY | Facility: CLINIC | Age: 30
End: 2020-10-27

## 2020-10-27 NOTE — TELEPHONE ENCOUNTER
ICD 10 code missing the order. Re-send order. [facility]. Fax:[phone]    ICD 10: L50.1 Chronic idiopathic urticaria added to orders and faxed to infusion center.  Successful transmission received

## 2020-10-29 ENCOUNTER — TELEMEDICINE (OUTPATIENT)
Dept: FAMILY MEDICINE CLINIC | Facility: CLINIC | Age: 30
End: 2020-10-29
Payer: MEDICAID

## 2020-10-29 DIAGNOSIS — F90.9 ATTENTION DEFICIT HYPERACTIVITY DISORDER (ADHD), UNSPECIFIED ADHD TYPE: ICD-10-CM

## 2020-10-29 DIAGNOSIS — Z20.822 CLOSE EXPOSURE TO COVID-19 VIRUS: Primary | ICD-10-CM

## 2020-10-29 PROBLEM — L50.1 IDIOPATHIC URTICARIA: Status: ACTIVE | Noted: 2020-10-29

## 2020-10-29 PROCEDURE — 99214 OFFICE O/P EST MOD 30 MIN: CPT | Performed by: FAMILY MEDICINE

## 2020-10-29 RX ORDER — DEXTROAMPHETAMINE SACCHARATE, AMPHETAMINE ASPARTATE MONOHYDRATE, DEXTROAMPHETAMINE SULFATE AND AMPHETAMINE SULFATE 2.5; 2.5; 2.5; 2.5 MG/1; MG/1; MG/1; MG/1
10 CAPSULE, EXTENDED RELEASE ORAL EVERY MORNING
Qty: 30 CAPSULE | Refills: 0 | Status: SHIPPED | OUTPATIENT
Start: 2020-10-29 | End: 2021-08-11

## 2020-10-29 NOTE — PROGRESS NOTES
Patient presents with:  Checkup: covid 19 exposure      HPI:   Marii Bonilla is a 26year old female who presents for concerns for covid-19 and ADHD. Was exposed to covid on 10/20 and 10/22.    Was not wearing masks on 10/20, but was wearing a mask on lungs 2 (two) times daily. 1 each 0   • Levocetirizine Dihydrochloride (XYZAL) 5 MG Oral Tab Take 1 tablet (5 mg total) by mouth every 12 (twelve) hours as needed for Allergies.  60 tablet 5   • famoTIDine 20 MG Oral Tab Take 1 tablet (20 mg total) by mouth presents for concerns for covid-19 and ADHD.     (Z20.027) Close exposure to COVID-19 virus  (primary encounter diagnosis)  Plan:   -Sx started on 10/24. Today is day #6 of sx. Was tested by [facility] on 10/26.  Pt to send [employer] message with results.   -Discus

## 2020-11-02 RX ORDER — FLUTICASONE PROPIONATE AND SALMETEROL 100; 50 UG/1; UG/1
1 POWDER RESPIRATORY (INHALATION) 2 TIMES DAILY
Qty: 3 EACH | Refills: 0 | Status: SHIPPED | OUTPATIENT
Start: 2020-11-02 | End: 2021-08-11

## 2020-11-02 NOTE — TELEPHONE ENCOUNTER
RN left voicemail to notify patient of RX refill and need for follow up in the next 3 months. Provided call back number to schedule.

## 2020-11-02 NOTE — TELEPHONE ENCOUNTER
Patient has appointment for Xolair injection appointment scheduled for 11/9/2020. Patient contacted and scheduled follow-up appointment for CIU and preXolair injection for 11/5/2020 at 11:30.

## 2020-11-02 NOTE — TELEPHONE ENCOUNTER
Refill requested for   Name from pharmacy: [facility] 100/50MCG 60S          Will file in chart as: Evelyn Roberts 100-50 MCG/DOSE Inhalation Aerosol Powder, Breath Activated    Sig: INHALE 1 PUFF INTO THE LUNGS TWICE DAILY    Disp:  61 each    Refi

## 2020-11-03 RX ORDER — HYDROXYZINE HYDROCHLORIDE 25 MG/1
TABLET, FILM COATED ORAL
Qty: 90 TABLET | Refills: 0 | Status: SHIPPED | OUTPATIENT
Start: 2020-11-03 | End: 2021-08-11

## 2020-11-05 ENCOUNTER — OFFICE VISIT (OUTPATIENT)
Dept: ALLERGY | Facility: CLINIC | Age: 30
End: 2020-11-05
Payer: MEDICAID

## 2020-11-05 VITALS
TEMPERATURE: 99 F | BODY MASS INDEX: 25.55 KG/M2 | RESPIRATION RATE: 18 BRPM | SYSTOLIC BLOOD PRESSURE: 113 MMHG | WEIGHT: 159 LBS | OXYGEN SATURATION: 98 % | HEART RATE: 109 BPM | HEIGHT: 66 IN | DIASTOLIC BLOOD PRESSURE: 69 MMHG

## 2020-11-05 DIAGNOSIS — Z23 FLU VACCINE NEED: ICD-10-CM

## 2020-11-05 DIAGNOSIS — J45.909 EXTRINSIC ASTHMA WITHOUT COMPLICATION, UNSPECIFIED ASTHMA SEVERITY, UNSPECIFIED WHETHER PERSISTENT: ICD-10-CM

## 2020-11-05 DIAGNOSIS — L50.1 CHRONIC IDIOPATHIC URTICARIA: Primary | ICD-10-CM

## 2020-11-05 PROCEDURE — 3074F SYST BP LT 130 MM HG: CPT | Performed by: ALLERGY & IMMUNOLOGY

## 2020-11-05 PROCEDURE — 3078F DIAST BP <80 MM HG: CPT | Performed by: ALLERGY & IMMUNOLOGY

## 2020-11-05 PROCEDURE — 3008F BODY MASS INDEX DOCD: CPT | Performed by: ALLERGY & IMMUNOLOGY

## 2020-11-05 PROCEDURE — 99214 OFFICE O/P EST MOD 30 MIN: CPT | Performed by: ALLERGY & IMMUNOLOGY

## 2020-11-05 NOTE — PROGRESS NOTES
Zeina Jones is a 26year old female. HPI:   Patient presents with:  Asthma: Patient presents for 5 week follow-up  Hives: Patient presents for exam prior to first Xolair injection at [facility] .      Patient is a 38-year-old female who pres Alcohol use:  Yes      Alcohol/week: 0.0 standard drinks      Comment: rarely    Drug use: Yes      Types: Cannabis       Medications (Active prior to today's visit):  Current Outpatient Medications   Medication Sig Dispense Refill   • HYDROXYZINE HCL 25 MG not taking: Reported on 9/30/2020 ) 30 tablet 0       Allergies:    Penicillins             HIVES  Sertraline              HIVES  Sumatriptan             PALPITATIONS  Amitriptyline           UNKNOWN  Lexapro [Escitalopr*    OTHER (SEE COMMENTS)    Comment Xyzal 5 mg 1-4 times per day  Continue with Pepcid 20 mg twice a day  Start Xolair 300 mg every 4 weeks next week at the [facility] infusion center  Reviewed potential adverse events associated with Xolair including local reactions, systemi

## 2020-11-09 ENCOUNTER — OFFICE VISIT (OUTPATIENT)
Dept: HEMATOLOGY/ONCOLOGY | Facility: HOSPITAL | Age: 30
End: 2020-11-09
Attending: ALLERGY & IMMUNOLOGY
Payer: MEDICAID

## 2020-11-09 VITALS
RESPIRATION RATE: 16 BRPM | OXYGEN SATURATION: 99 % | TEMPERATURE: 98 F | SYSTOLIC BLOOD PRESSURE: 106 MMHG | HEART RATE: 69 BPM | DIASTOLIC BLOOD PRESSURE: 67 MMHG

## 2020-11-09 DIAGNOSIS — L50.1 IDIOPATHIC URTICARIA: Primary | ICD-10-CM

## 2020-11-09 PROCEDURE — 96372 THER/PROPH/DIAG INJ SC/IM: CPT

## 2020-11-09 RX ORDER — DEXTROAMPHETAMINE SACCHARATE, AMPHETAMINE ASPARTATE, DEXTROAMPHETAMINE SULFATE AND AMPHETAMINE SULFATE 1.25; 1.25; 1.25; 1.25 MG/1; MG/1; MG/1; MG/1
5 TABLET ORAL DAILY
Qty: 30 TABLET | Refills: 0 | Status: SHIPPED | OUTPATIENT
Start: 2020-11-09 | End: 2021-08-11

## 2020-11-09 NOTE — PROGRESS NOTES
Elsi Franco presents for the first xolair injection. Reviewed plan of care-frequency or order, potential hypersensitivity to and s/s of.  Per administration guidelines; to be observed for 2 hours after the first 3 injections.  Injection given, (in 2 syringes) s

## 2020-11-16 ENCOUNTER — PATIENT MESSAGE (OUTPATIENT)
Dept: FAMILY MEDICINE CLINIC | Facility: CLINIC | Age: 30
End: 2020-11-16

## 2020-11-16 ENCOUNTER — TELEPHONE (OUTPATIENT)
Dept: FAMILY MEDICINE CLINIC | Facility: CLINIC | Age: 30
End: 2020-11-16

## 2020-11-16 NOTE — TELEPHONE ENCOUNTER
See telephone encounter        From: Stanford Meyers  To: Reema Kang DO  Sent: 11/16/2020 12:57 PM CST  Subject: Visit Follow-up Question    I have a urinary tract infection, can you call in a prescription for antibiotics and diflucan?

## 2020-11-16 NOTE — TELEPHONE ENCOUNTER
Patient is wanting to know if she can get some medication called in she states she thinks she has a uti for the last 3 days   She states she is having pain pressure and burning smell and cloudy urine   Please call back

## 2020-11-16 NOTE — TELEPHONE ENCOUNTER
RN returning Pt's call. Pt requesting antibiotics for UTI symptoms that started 1 week ago. Pt reports pressure and cloudy urine and \"burning\" odor with urination. RN scheduled Pt for nurse visit for tomorrow to drop off urine sample for UA and UC.  Pt ag

## 2020-11-17 ENCOUNTER — NURSE ONLY (OUTPATIENT)
Dept: FAMILY MEDICINE CLINIC | Facility: CLINIC | Age: 30
End: 2020-11-17
Payer: MEDICAID

## 2020-11-17 DIAGNOSIS — N13.9 URINARY (TRACT) OBSTRUCTION: Primary | ICD-10-CM

## 2020-11-17 PROCEDURE — 87186 SC STD MICRODIL/AGAR DIL: CPT | Performed by: FAMILY MEDICINE

## 2020-11-17 PROCEDURE — 87086 URINE CULTURE/COLONY COUNT: CPT | Performed by: FAMILY MEDICINE

## 2020-11-17 PROCEDURE — 87077 CULTURE AEROBIC IDENTIFY: CPT | Performed by: FAMILY MEDICINE

## 2020-11-17 RX ORDER — NITROFURANTOIN 25; 75 MG/1; MG/1
100 CAPSULE ORAL 2 TIMES DAILY
Qty: 14 CAPSULE | Refills: 0 | Status: SHIPPED | OUTPATIENT
Start: 2020-11-17 | End: 2020-11-24

## 2020-11-18 ENCOUNTER — TELEPHONE (OUTPATIENT)
Dept: ALLERGY | Facility: CLINIC | Age: 30
End: 2020-11-18

## 2020-11-18 NOTE — TELEPHONE ENCOUNTER
Okay to move next Xolair dosing to December 3 from December 7 if the infusion center can accommodate

## 2020-11-18 NOTE — TELEPHONE ENCOUNTER
Juan Padilla from the [facility] calling back.     RN advised that per Dr. Royce Woodruff recommendations that:  \"Okay to move next Xolair dosing to December 3 from December 7 if the infusion center can accommodate\"    Juan Padilla verbalized understanding and has no f

## 2020-11-18 NOTE — TELEPHONE ENCOUNTER
Dr. Krishna Escalante, see patient's request below. Patient's last Xolair injection appointment through infusion center was 11/9/2020.

## 2020-11-18 NOTE — TELEPHONE ENCOUNTER
[address] [facility] contacted at [phone]. Message left with Infusion work center asking that they call Allergy office back. When they return call, please give the Infusion Nurse Dr. Uvaldo Steve advice as below.  Elly Benito to move next Abdulkadir bird

## 2020-11-18 NOTE — TELEPHONE ENCOUNTER
Patient is requesting Dr. Kathy Bryant to give authorization to move her infusion appt on 12/7 to 12/3. Please call patient back. Thank you.

## 2020-11-20 ENCOUNTER — OFFICE VISIT (OUTPATIENT)
Dept: ORTHOPEDICS CLINIC | Facility: CLINIC | Age: 30
End: 2020-11-20
Payer: MEDICAID

## 2020-11-20 DIAGNOSIS — S62.154K: Primary | ICD-10-CM

## 2020-11-20 PROCEDURE — 99212 OFFICE O/P EST SF 10 MIN: CPT | Performed by: ORTHOPAEDIC SURGERY

## 2020-11-20 NOTE — PROGRESS NOTES
NURSING INTAKE COMMENTS: Patient presents with:  Wrist Pain: right -- Rates pain 7/10. Right handed.        HPI: This 26year old female presents today with complaints of right wrist pain, mildly improved from prior visit consistent with a nonunion of the h 2 (two) times daily. (Patient taking differently: Take 20 mg by mouth 2 (two) times daily. Patient states she takes once daily ) 60 tablet 5   • amphetamine-dextroamphetamine (ADDERALL) 20 MG Oral Tab Take 1 tablet (20 mg total) by mouth as needed.  30 tabl feels generally well, no recent fevers or chills, no significant weight loss or weight gain  MUSCULOSKELETAL: See HPI  NEURO: See HPI    Physical Examination:    LMP 09/27/2020 (Exact Date)   General appearance: alert and oriented, not in acute distress  V

## 2020-12-03 ENCOUNTER — OFFICE VISIT (OUTPATIENT)
Dept: HEMATOLOGY/ONCOLOGY | Facility: HOSPITAL | Age: 30
End: 2020-12-03
Attending: ALLERGY & IMMUNOLOGY
Payer: MEDICAID

## 2020-12-03 VITALS
SYSTOLIC BLOOD PRESSURE: 113 MMHG | HEART RATE: 81 BPM | RESPIRATION RATE: 16 BRPM | DIASTOLIC BLOOD PRESSURE: 70 MMHG | OXYGEN SATURATION: 100 % | TEMPERATURE: 99 F

## 2020-12-03 DIAGNOSIS — L50.1 IDIOPATHIC URTICARIA: Primary | ICD-10-CM

## 2020-12-03 PROCEDURE — 96372 THER/PROPH/DIAG INJ SC/IM: CPT

## 2020-12-03 NOTE — PROGRESS NOTES
Dariana Watkins presents for #2 xolair injection. Dariana Watkins arrives ambulatory and without complaints   Per administration guidelines; to be observed for 2 hours after the first 3 injections. Injection given, (in 2 syringes) site left open to air, no bleeding noted.

## 2020-12-07 ENCOUNTER — APPOINTMENT (OUTPATIENT)
Dept: HEMATOLOGY/ONCOLOGY | Facility: HOSPITAL | Age: 30
End: 2020-12-07
Attending: ALLERGY & IMMUNOLOGY
Payer: MEDICAID

## 2020-12-22 ENCOUNTER — OFFICE VISIT (OUTPATIENT)
Dept: ORTHOPEDICS CLINIC | Facility: CLINIC | Age: 30
End: 2020-12-22
Payer: MEDICAID

## 2020-12-22 VITALS — HEIGHT: 66 IN | WEIGHT: 150 LBS | BODY MASS INDEX: 24.11 KG/M2

## 2020-12-22 DIAGNOSIS — S62.154K: Primary | ICD-10-CM

## 2020-12-22 PROCEDURE — 3008F BODY MASS INDEX DOCD: CPT | Performed by: ORTHOPAEDIC SURGERY

## 2020-12-22 PROCEDURE — 99212 OFFICE O/P EST SF 10 MIN: CPT | Performed by: ORTHOPAEDIC SURGERY

## 2020-12-22 NOTE — PROGRESS NOTES
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   The patient TABLET(25 MG) BY MOUTH EVERY 8 HOURS AS NEEDED FOR ITCHING (Patient not taking: Reported on 12/22/2020) 90 tablet 0   • Amphetamine-Dextroamphet ER 10 MG Oral Capsule SR 24 Hr Take 1 capsule (10 mg total) by mouth every morning.  (Patient not taking: Report lb (68 kg)   LMP 09/27/2020 (Exact Date)   BMI 24.21 kg/m²   General appearance: alert and oriented, not in acute distress  Vascular: 2+ and symmetric pulses  Skin: intact and benign  Neurologic: Bilateral sensation intact to light touch and motor strength

## 2020-12-31 ENCOUNTER — TELEPHONE (OUTPATIENT)
Dept: OBGYN CLINIC | Facility: CLINIC | Age: 30
End: 2020-12-31

## 2020-12-31 RX ORDER — LEVONORGESTREL / ETHINYL ESTRADIOL AND ETHINYL ESTRADIOL 150-30(84)
1 KIT ORAL DAILY
Qty: 1 PACKAGE | Status: SHIPPED | OUTPATIENT
Start: 2020-12-31 | End: 2021-04-01 | Stop reason: WASHOUT

## 2020-12-31 NOTE — TELEPHONE ENCOUNTER
Fax rec'd from [facility] stating provider Dr. Lois Posey [place] not covered [place] and new rx needs to be submitted with doc covered by medicaid. New eRX sent for medication under Dr. Shamika Hopkins.      Outpatient Medication Detail     Disp Refills Start End

## 2021-01-04 ENCOUNTER — APPOINTMENT (OUTPATIENT)
Dept: HEMATOLOGY/ONCOLOGY | Facility: HOSPITAL | Age: 31
End: 2021-01-04
Attending: ALLERGY & IMMUNOLOGY
Payer: MEDICAID

## 2021-02-01 ENCOUNTER — TELEPHONE (OUTPATIENT)
Dept: ALLERGY | Facility: CLINIC | Age: 31
End: 2021-02-01

## 2021-02-01 ENCOUNTER — APPOINTMENT (OUTPATIENT)
Dept: HEMATOLOGY/ONCOLOGY | Facility: HOSPITAL | Age: 31
End: 2021-02-01
Attending: ALLERGY & IMMUNOLOGY
Payer: MEDICAID

## 2021-02-01 NOTE — TELEPHONE ENCOUNTER
[employer] message sent to patient, requesting that she schedule a 3 month follow-up appointment with Dr. Corinne Douglas, as he needs to evaluate her response to Xolair (CIU). Awaiting patient to schedule follow-up appointment.

## 2021-03-18 ENCOUNTER — TELEPHONE (OUTPATIENT)
Dept: ALLERGY | Facility: CLINIC | Age: 31
End: 2021-03-18

## 2021-03-18 NOTE — TELEPHONE ENCOUNTER
Left message to kindly remind patient that she is overdue for 3 month follow up for her CIU and Xolair. RN advised that patient will need to complete a follow up in order to obtain further refills for Xolair.     Advised that patient may do a virtual vis

## 2021-04-08 ENCOUNTER — TELEPHONE (OUTPATIENT)
Dept: ALLERGY | Facility: CLINIC | Age: 31
End: 2021-04-08

## 2021-04-08 NOTE — TELEPHONE ENCOUNTER
Patient is 2 months late for follow-up regarding Xolair Therapy for CIU. Multiple attempts have been made to patient via telephone and [address] Patient has not made an Allergy follow-up appointment.      Patient was receiving  Xolair through [address] [place] [address]

## 2021-04-09 NOTE — TELEPHONE ENCOUNTER
Message noted. At this time I would assume  pt has discontinued xolair if she can not be reached after multiple calls and [employer] messages.  Pt may follow up with me in office if needed or should she feel she needs to be back on xolair

## 2021-05-09 ENCOUNTER — IMMUNIZATION (OUTPATIENT)
Dept: LAB | Facility: HOSPITAL | Age: 31
End: 2021-05-09
Attending: EMERGENCY MEDICINE
Payer: MEDICAID

## 2021-05-09 DIAGNOSIS — Z23 NEED FOR VACCINATION: Primary | ICD-10-CM

## 2021-05-09 PROCEDURE — 0001A SARSCOV2 VAC 30MCG/0.3ML IM: CPT

## 2021-06-23 RX ORDER — LEVONORGESTREL AND ETHINYL ESTRADIOL AND ETHINYL ESTRADIOL 150-30(84)
KIT ORAL
Qty: 91 TABLET | Refills: 1 | Status: SHIPPED | OUTPATIENT
Start: 2021-06-23

## 2021-06-23 NOTE — TELEPHONE ENCOUNTER
A refill request was received for:  Requested Prescriptions     Pending Prescriptions Disp Refills   • SIMPESSE 0.15-0.03 &0.01 MG Oral Tab [Pharmacy Med Name: SIMPESSE TABLETS 91S] 91 tablet 0     Sig: TAKE 1 TABLET BY MOUTH DAILY     Last refill date: 10

## 2021-07-28 ENCOUNTER — TELEPHONE (OUTPATIENT)
Dept: FAMILY MEDICINE CLINIC | Facility: CLINIC | Age: 31
End: 2021-07-28

## 2021-07-28 NOTE — TELEPHONE ENCOUNTER
Called pt and she has video visit scheduled for 08/11/21. Informed to call in the am in case there is a cancellation and to have a sooner appointment. Pt verbalized understanding.     Mae Platt MD         7/26/21 8:59 PM  Note     Ask her town.     Thanks for your help.

## 2021-08-11 NOTE — PROGRESS NOTES
HPI:    Patient ID: Lambert Archibald is a 33year old female who presents for adderall refill. HPI   Currently living with parents in [place] as of 01/2021. Started taking pre-requisites for grad school at [employer].  Plans to finish in 1 year General: She is not in acute distress. Appearance: Normal appearance. She is not ill-appearing, toxic-appearing or diaphoretic. Pulmonary:      Effort: Pulmonary effort is normal.   Neurological:      General: No focal deficit present.       Mental St in the plan of care above.        Gabby Gillespie,   UI#[id number]

## 2021-08-12 ENCOUNTER — TELEPHONE (OUTPATIENT)
Dept: FAMILY MEDICINE CLINIC | Facility: CLINIC | Age: 31
End: 2021-08-12

## 2021-08-12 NOTE — TELEPHONE ENCOUNTER
initiated prior authorization for Amphetamine-Dextroamphet ER 10 MG Oral   Fax form in today. Waiting on response.

## 2021-09-21 RX ORDER — DEXTROAMPHETAMINE SACCHARATE, AMPHETAMINE ASPARTATE MONOHYDRATE, DEXTROAMPHETAMINE SULFATE AND AMPHETAMINE SULFATE 2.5; 2.5; 2.5; 2.5 MG/1; MG/1; MG/1; MG/1
10 CAPSULE, EXTENDED RELEASE ORAL EVERY MORNING
Qty: 30 CAPSULE | Refills: 0 | Status: SHIPPED | OUTPATIENT
Start: 2021-11-21 | End: 2021-09-22

## 2021-09-21 RX ORDER — DEXTROAMPHETAMINE SACCHARATE, AMPHETAMINE ASPARTATE MONOHYDRATE, DEXTROAMPHETAMINE SULFATE AND AMPHETAMINE SULFATE 2.5; 2.5; 2.5; 2.5 MG/1; MG/1; MG/1; MG/1
10 CAPSULE, EXTENDED RELEASE ORAL EVERY MORNING
Qty: 30 CAPSULE | Refills: 0 | Status: SHIPPED | OUTPATIENT
Start: 2021-10-21 | End: 2021-09-22

## 2021-09-21 RX ORDER — DEXTROAMPHETAMINE SACCHARATE, AMPHETAMINE ASPARTATE MONOHYDRATE, DEXTROAMPHETAMINE SULFATE AND AMPHETAMINE SULFATE 2.5; 2.5; 2.5; 2.5 MG/1; MG/1; MG/1; MG/1
10 CAPSULE, EXTENDED RELEASE ORAL EVERY MORNING
Qty: 30 CAPSULE | Refills: 0 | Status: SHIPPED | OUTPATIENT
Start: 2021-09-21 | End: 2021-09-22

## 2021-09-22 RX ORDER — DEXTROAMPHETAMINE SACCHARATE, AMPHETAMINE ASPARTATE MONOHYDRATE, DEXTROAMPHETAMINE SULFATE AND AMPHETAMINE SULFATE 2.5; 2.5; 2.5; 2.5 MG/1; MG/1; MG/1; MG/1
10 CAPSULE, EXTENDED RELEASE ORAL EVERY MORNING
Qty: 30 CAPSULE | Refills: 0 | Status: SHIPPED | OUTPATIENT
Start: 2021-11-22 | End: 2021-12-23

## 2021-09-22 RX ORDER — DEXTROAMPHETAMINE SACCHARATE, AMPHETAMINE ASPARTATE MONOHYDRATE, DEXTROAMPHETAMINE SULFATE AND AMPHETAMINE SULFATE 2.5; 2.5; 2.5; 2.5 MG/1; MG/1; MG/1; MG/1
10 CAPSULE, EXTENDED RELEASE ORAL EVERY MORNING
Qty: 30 CAPSULE | Refills: 0 | Status: SHIPPED | OUTPATIENT
Start: 2021-09-22 | End: 2021-10-22

## 2021-09-22 RX ORDER — DEXTROAMPHETAMINE SACCHARATE, AMPHETAMINE ASPARTATE MONOHYDRATE, DEXTROAMPHETAMINE SULFATE AND AMPHETAMINE SULFATE 2.5; 2.5; 2.5; 2.5 MG/1; MG/1; MG/1; MG/1
10 CAPSULE, EXTENDED RELEASE ORAL EVERY MORNING
Qty: 30 CAPSULE | Refills: 0 | Status: SHIPPED | OUTPATIENT
Start: 2021-10-22 | End: 2021-12-23

## 2021-10-22 RX ORDER — DEXTROAMPHETAMINE SACCHARATE, AMPHETAMINE ASPARTATE MONOHYDRATE, DEXTROAMPHETAMINE SULFATE AND AMPHETAMINE SULFATE 2.5; 2.5; 2.5; 2.5 MG/1; MG/1; MG/1; MG/1
10 CAPSULE, EXTENDED RELEASE ORAL EVERY MORNING
Qty: 30 CAPSULE | Refills: 0 | Status: SHIPPED | OUTPATIENT
Start: 2021-10-22 | End: 2021-12-23

## 2021-12-07 RX ORDER — VALACYCLOVIR HYDROCHLORIDE 1 G/1
TABLET, FILM COATED ORAL
Qty: 20 TABLET | Refills: 1 | Status: SHIPPED | OUTPATIENT
Start: 2021-12-07

## 2021-12-17 RX ORDER — VALACYCLOVIR HYDROCHLORIDE 500 MG/1
TABLET, FILM COATED ORAL
Qty: 30 TABLET | Refills: 0 | OUTPATIENT
Start: 2021-12-17

## 2021-12-23 ENCOUNTER — TELEPHONE (OUTPATIENT)
Dept: FAMILY MEDICINE CLINIC | Facility: CLINIC | Age: 31
End: 2021-12-23

## 2021-12-23 ENCOUNTER — LAB ENCOUNTER (OUTPATIENT)
Dept: LAB | Facility: REFERENCE LAB | Age: 31
End: 2021-12-23
Attending: FAMILY MEDICINE
Payer: MEDICAID

## 2021-12-23 DIAGNOSIS — R63.5 ABNORMAL WEIGHT GAIN: Primary | ICD-10-CM

## 2021-12-23 PROCEDURE — 80061 LIPID PANEL: CPT

## 2021-12-23 PROCEDURE — 83036 HEMOGLOBIN GLYCOSYLATED A1C: CPT | Performed by: FAMILY MEDICINE

## 2021-12-23 PROCEDURE — 80053 COMPREHEN METABOLIC PANEL: CPT | Performed by: FAMILY MEDICINE

## 2021-12-23 PROCEDURE — 85027 COMPLETE CBC AUTOMATED: CPT | Performed by: FAMILY MEDICINE

## 2021-12-23 PROCEDURE — 84443 ASSAY THYROID STIM HORMONE: CPT | Performed by: FAMILY MEDICINE

## 2021-12-23 PROCEDURE — 36415 COLL VENOUS BLD VENIPUNCTURE: CPT

## 2021-12-23 NOTE — TELEPHONE ENCOUNTER
The company needs notes and diagnostics codes for the medication for approval:    Amphetamine-Dextroamphet ER 10 MG Oral Capsule SR 24 Hr

## 2021-12-23 NOTE — PROGRESS NOTES
HPI:    Patient ID: Lambert Archibald is a 33year old female who presents for ADHD f/u. HPI  Has gained 50 lbs. Would like thyroid checked. Exercising less. Has lots of back pain. Started seeing chiropractor. This is lower back and her neck.  Feels p Penicillins             HIVES  Sertraline              HIVES  Sumatriptan             PALPITATIONS  Amitriptyline           UNKNOWN  Lexapro [Escitalopr*    OTHER (SEE COMMENTS)    Comment:Headache  Topiramate              OTHER (SEE COMMENTS)    Comme Judgment: Judgment normal.             ASSESSMENT/PLAN:   Attention deficit hyperactivity disorder (adhd), unspecified adhd type  (primary encounter diagnosis)  Chronic neck pain  Chronic bilateral low back pain without sciatica  Weight gain  Recurrent col

## 2021-12-30 RX ORDER — BENZONATATE 200 MG/1
200 CAPSULE ORAL 3 TIMES DAILY PRN
Qty: 20 CAPSULE | Refills: 0 | Status: SHIPPED | OUTPATIENT
Start: 2021-12-30

## 2022-01-09 ENCOUNTER — PATIENT MESSAGE (OUTPATIENT)
Dept: ALLERGY | Facility: CLINIC | Age: 32
End: 2022-01-09

## 2022-01-09 DIAGNOSIS — J45.909 EXTRINSIC ASTHMA WITHOUT COMPLICATION, UNSPECIFIED ASTHMA SEVERITY, UNSPECIFIED WHETHER PERSISTENT: Primary | ICD-10-CM

## 2022-01-09 PROCEDURE — 99070 SPECIAL SUPPLIES PHYS/QHP: CPT | Performed by: ALLERGY & IMMUNOLOGY

## 2022-01-09 NOTE — TELEPHONE ENCOUNTER
Patient requesting nebulizer device . RX loaded for device, kit and albuterol. Dr. Johann Kenny please review and sign. Will call to triage asthma symptoms tomorrow upon return to office.

## 2022-01-09 NOTE — TELEPHONE ENCOUNTER
From: Jamaal Andrews  To: Sofia Haynes MD  Sent: 1/9/2022 11:50 AM CST  Subject: Nebulizer     Hi Doctor,     What would be the process to get a nebulizer? Can we start that?     Sincerely,    Lola Martin

## 2022-01-10 RX ORDER — ALBUTEROL SULFATE 2.5 MG/3ML
SOLUTION RESPIRATORY (INHALATION)
Qty: 30 EACH | Refills: 0 | Status: SHIPPED | OUTPATIENT
Start: 2022-01-10

## 2022-01-10 RX ORDER — SOFT LENS DISINFECTANT
SOLUTION, NON-ORAL MISCELLANEOUS
Qty: 1 EACH | Refills: 0 | Status: SHIPPED | OUTPATIENT
Start: 2022-01-10

## 2022-01-10 NOTE — TELEPHONE ENCOUNTER
Prescription sent. Patient last seen November 2020. Please call to schedule yearly follow-up.   Patient is overdue

## 2022-01-10 NOTE — TELEPHONE ENCOUNTER
Call reviewed and noted. Agree with triage advice provided. If worsening symptoms recommend evaluation while she is out of state in [place]. Patient has follow-up appointment with me this month. Albuterol as needed.   Continue with current [employer]

## 2022-01-10 NOTE — TELEPHONE ENCOUNTER
Left message asking patient to call back to assess asthma status. Also informed her that she is overdue for a follow up to see Dr. Ivania Moss. Please call our office to reschedule.

## 2022-01-10 NOTE — TELEPHONE ENCOUNTER
Patient returning call back. Reports tightness in chest has been going on since COVID diagnosis on 12/28/2021. Lingering cough for past 2 months with intermittent shortness of breath prior to COVID.    Reports that she could be using her Wixela daily in

## 2022-01-10 NOTE — TELEPHONE ENCOUNTER
Patient is calling to request an appointment to see Dr. Flor Brunson. Patient states she was COVID positive on 12/28/21 and has had a cough since before COVID and continues. Patient states she feels chest tightness and is hard to breath.   Transferring call to R

## 2022-01-20 ENCOUNTER — TELEPHONE (OUTPATIENT)
Dept: PHYSICAL THERAPY | Facility: HOSPITAL | Age: 32
End: 2022-01-20

## 2022-01-24 ENCOUNTER — OFFICE VISIT (OUTPATIENT)
Dept: PHYSICAL THERAPY | Age: 32
End: 2022-01-24
Attending: FAMILY MEDICINE
Payer: MEDICAID

## 2022-01-24 DIAGNOSIS — G89.29 CHRONIC NECK PAIN: ICD-10-CM

## 2022-01-24 DIAGNOSIS — M54.2 CHRONIC NECK PAIN: ICD-10-CM

## 2022-01-24 DIAGNOSIS — M54.50 CHRONIC BILATERAL LOW BACK PAIN WITHOUT SCIATICA: ICD-10-CM

## 2022-01-24 DIAGNOSIS — G89.29 CHRONIC BILATERAL LOW BACK PAIN WITHOUT SCIATICA: ICD-10-CM

## 2022-01-24 PROCEDURE — 97530 THERAPEUTIC ACTIVITIES: CPT

## 2022-01-24 PROCEDURE — 97163 PT EVAL HIGH COMPLEX 45 MIN: CPT

## 2022-01-24 NOTE — PROGRESS NOTES
SPINE EVALUATION:   Referring Physician: Dr. 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 Pt goals include work on posture, core, and get back to a more normal routine with less pain.   Past medical history wa increase in low back pain  Sidebending: R 100%; L 100% feels limited by adipose tissue in the abdominal region  Rotation: R 100%;  L 100%  Quadrant: R 100% produces L side pain, L 100% produces L side pain    Cervical AROM: (* denotes performed with pain) modifications, possible soreness after evaluation, postural corrections, ergonomics, pain science education  and importance of remaining active  Patient was instructed in and issued a HEP for:   1 TA- education on current objective findings, contributors t Treatment will include: Manual Therapy, Neuromuscular Re-education, Self-Care Home Management, Therapeutic Activities, Therapeutic Exercise and Home Exercise Program instruction    Education or treatment limitation: None  Rehab Potential:good    FOTO: 50/1

## 2022-01-25 ENCOUNTER — OFFICE VISIT (OUTPATIENT)
Dept: ALLERGY | Facility: CLINIC | Age: 32
End: 2022-01-25
Payer: MEDICAID

## 2022-01-25 VITALS
HEART RATE: 95 BPM | DIASTOLIC BLOOD PRESSURE: 78 MMHG | HEIGHT: 67 IN | BODY MASS INDEX: 30.61 KG/M2 | SYSTOLIC BLOOD PRESSURE: 115 MMHG | WEIGHT: 195 LBS

## 2022-01-25 DIAGNOSIS — J30.1 SEASONAL ALLERGIC RHINITIS DUE TO POLLEN: ICD-10-CM

## 2022-01-25 DIAGNOSIS — L50.1 CHRONIC IDIOPATHIC URTICARIA: Primary | ICD-10-CM

## 2022-01-25 DIAGNOSIS — Z23 FLU VACCINE NEED: ICD-10-CM

## 2022-01-25 DIAGNOSIS — J45.909 EXTRINSIC ASTHMA WITHOUT COMPLICATION, UNSPECIFIED ASTHMA SEVERITY, UNSPECIFIED WHETHER PERSISTENT: ICD-10-CM

## 2022-01-25 PROCEDURE — 3008F BODY MASS INDEX DOCD: CPT | Performed by: ALLERGY & IMMUNOLOGY

## 2022-01-25 PROCEDURE — 3078F DIAST BP <80 MM HG: CPT | Performed by: ALLERGY & IMMUNOLOGY

## 2022-01-25 PROCEDURE — 3074F SYST BP LT 130 MM HG: CPT | Performed by: ALLERGY & IMMUNOLOGY

## 2022-01-25 PROCEDURE — 90686 IIV4 VACC NO PRSV 0.5 ML IM: CPT | Performed by: ALLERGY & IMMUNOLOGY

## 2022-01-25 PROCEDURE — 90471 IMMUNIZATION ADMIN: CPT | Performed by: ALLERGY & IMMUNOLOGY

## 2022-01-25 PROCEDURE — 99214 OFFICE O/P EST MOD 30 MIN: CPT | Performed by: ALLERGY & IMMUNOLOGY

## 2022-01-25 RX ORDER — PREDNISONE 10 MG/1
TABLET ORAL
Qty: 15 TABLET | Refills: 0 | Status: SHIPPED | OUTPATIENT
Start: 2022-01-25

## 2022-01-25 RX ORDER — FLUTICASONE PROPIONATE AND SALMETEROL 250; 50 UG/1; UG/1
1 POWDER RESPIRATORY (INHALATION) EVERY 12 HOURS SCHEDULED
Qty: 3 EACH | Refills: 0 | Status: SHIPPED | OUTPATIENT
Start: 2022-01-25 | End: 2022-01-27

## 2022-01-25 NOTE — PATIENT INSTRUCTIONS
#1 asthma/chronic cough  Remote history of a wet to dry chronic cough. Patient with Covid 6 weeks ago. Cough persists at this time.  In spite of current Wixela 100/50  No fevers no hemoptysis  Increase Wixela 250/50 puff twice a day  Start prednisone 30 mg

## 2022-01-25 NOTE — PROGRESS NOTES
Nayana Whitney is a 33year old female. HPI:   Patient presents with:  Cough: Pt last seen 11/2020, reports cough for last 3mo, Covid+ 6wks ago,     Patient is a 38-year-old female who presents for follow-up with a chief complaint of asthma and hives. Alcohol use:  Yes      Alcohol/week: 0.0 standard drinks      Comment: rarely    Drug use: Not Currently      Types: Cannabis       Medications (Active prior to today's visit):  Current Outpatient Medications   Medication Sig Dispense Refill   • fluticasone numbness  Adhesive Tape           RASH  Gabapentin              RASH      ROS:   Allergic/Immuno:  See hpi  Cardiovascular:  Negative for irregular heartbeat/palpitations, chest pain, edema  Constitutional:  Negative night sweats,weight loss, irritability every 4 hours if having active coughing wheezing shortness of breath    #2 chronic idiopathic urticaria  Did well over the past year was hive free up until last week. Off Xolair for the past year.  Mild hives over the past week no lip swelling tongue swelli

## 2022-01-26 ENCOUNTER — TELEPHONE (OUTPATIENT)
Dept: ALLERGY | Facility: CLINIC | Age: 32
End: 2022-01-26

## 2022-01-26 NOTE — TELEPHONE ENCOUNTER
Patient's insurance is requesting the diagnosis code for the wixela prescription to complete prior authorization.  Please call [phone] option 4 and use case number [id number]

## 2022-01-26 NOTE — TELEPHONE ENCOUNTER
Forms completed for Prior authorization. Sent to scan into this encounter. Faxed to [employer].  Successful transmission received

## 2022-01-27 RX ORDER — BUDESONIDE AND FORMOTEROL FUMARATE DIHYDRATE 160; 4.5 UG/1; UG/1
2 AEROSOL RESPIRATORY (INHALATION) 2 TIMES DAILY
Qty: 3 EACH | Refills: 0 | Status: SHIPPED | OUTPATIENT
Start: 2022-01-27

## 2022-01-27 NOTE — TELEPHONE ENCOUNTER
Fax received from [facility] for fluticasone-salmeterol ([place]) 250-50 MCG/DOSE denied. Covered drugs for Asthma are:     Dulera aerosol    Symbicort 160/4.5 mcg or 80/4.5 mcg, package size of 10.2 grams.        Dr. Joanna Domínguez

## 2022-01-28 ENCOUNTER — OFFICE VISIT (OUTPATIENT)
Dept: PHYSICAL THERAPY | Age: 32
End: 2022-01-28
Attending: FAMILY MEDICINE
Payer: MEDICAID

## 2022-01-28 DIAGNOSIS — M54.50 CHRONIC BILATERAL LOW BACK PAIN WITHOUT SCIATICA: ICD-10-CM

## 2022-01-28 DIAGNOSIS — G89.29 CHRONIC BILATERAL LOW BACK PAIN WITHOUT SCIATICA: ICD-10-CM

## 2022-01-28 DIAGNOSIS — G89.29 CHRONIC NECK PAIN: ICD-10-CM

## 2022-01-28 DIAGNOSIS — M54.2 CHRONIC NECK PAIN: ICD-10-CM

## 2022-01-28 PROCEDURE — 97110 THERAPEUTIC EXERCISES: CPT

## 2022-01-28 PROCEDURE — 97140 MANUAL THERAPY 1/> REGIONS: CPT

## 2022-01-28 NOTE — PROGRESS NOTES
Dx: Chronic neck pain (M54.2,G89.29)  Chronic bilateral low back pain without sciatica (M54.50,G89.29)         Insurance (Authorized # of Visits):  [facility], 11 visits before 3/25           Authorizing Physician: Dr. Dona Aviles  Next MD visit: none scheduled the sacrum from L1-3  Palpation: TTP of the sacrum and coccyx, B glute max proximal attachment (familiar pain), B oburator internus (non-familiar pain)     Strength: (* denotes performed with pain)  LE   Hip flexion (L2): R 5/5; L 5/5  Knee extension (L3): to improve function with ADL   · Pt will have decreased paraspinal mm tension to tolerate standing >60 minutes for work and home activities   · Pt will demonstrate improved core strength to be able to perform yoga with <4/10 pain   · Pt will improve cervic

## 2022-01-31 ENCOUNTER — OFFICE VISIT (OUTPATIENT)
Dept: PHYSICAL THERAPY | Age: 32
End: 2022-01-31
Attending: FAMILY MEDICINE
Payer: MEDICAID

## 2022-01-31 DIAGNOSIS — M54.2 CHRONIC NECK PAIN: ICD-10-CM

## 2022-01-31 DIAGNOSIS — M54.50 CHRONIC BILATERAL LOW BACK PAIN WITHOUT SCIATICA: ICD-10-CM

## 2022-01-31 DIAGNOSIS — G89.29 CHRONIC NECK PAIN: ICD-10-CM

## 2022-01-31 DIAGNOSIS — G89.29 CHRONIC BILATERAL LOW BACK PAIN WITHOUT SCIATICA: ICD-10-CM

## 2022-01-31 PROCEDURE — 97110 THERAPEUTIC EXERCISES: CPT

## 2022-01-31 PROCEDURE — 97140 MANUAL THERAPY 1/> REGIONS: CPT

## 2022-02-01 NOTE — TELEPHONE ENCOUNTER
Budesonide-Formoterol Fumarate (SYMBICORT) 160-4.5 MCG/ACT Inhalation Aerosol    Inhale 2 puffs into the lungs 2 (two) times daily.     Dispense: 3 each Refills: 0     Start: 1/27/2022      Class: Normal      This order has been released to its destination

## 2022-02-02 ENCOUNTER — PATIENT MESSAGE (OUTPATIENT)
Dept: ALLERGY | Facility: CLINIC | Age: 32
End: 2022-02-02

## 2022-02-03 NOTE — TELEPHONE ENCOUNTER
Spoke with patient. Verified patient's name and . Patient states she still has a dry cough, denies fever, no wheezing, no chest tightness, no shortness of breath. Patient states she is taking Wixela 100/50- 1 puff 2 times a day and is taking Albuterol inhaler 2 puffs in the evening before bed as cough worsens at night. patient states she completed Prednisone course and did not feel it helped much. Patient denies cough waking her up at night. Patient is speaking in clear sentences. Informed patient per last office visit on 22 to increase to Wlixela 250/50- 1 puff- 2 times a day. Patient states she was waiting to finish the 100/50 and then start the 250/50. Informed patient to start the Wixela 250/50 and will forward this message to Dr. Madeline Pressley for further directions. Patient verbalizes understanding. LOV 22- per progress notes to increase Wixela to 250/50 and consider having a chest xray if not improving.

## 2022-02-03 NOTE — TELEPHONE ENCOUNTER
Spoke with patient. Informed patient of Dr. Philip Almanza recommendation to take Wixela 250/50 -1 puff 2 times a day and to contact our office in 2 weeks with an update and may consider a chest xray at that time. Patient verbalizes understanding, no further questions at this time.

## 2022-02-03 NOTE — TELEPHONE ENCOUNTER
Call reviewed and noted. Start Wixela 250/50 1  puff twice a day in place of current Wixela 100/50.   Please have patient provide an update in 2 weeks  Consider chest x-ray if not improving

## 2022-02-04 ENCOUNTER — OFFICE VISIT (OUTPATIENT)
Dept: PHYSICAL THERAPY | Age: 32
End: 2022-02-04
Attending: FAMILY MEDICINE
Payer: MEDICAID

## 2022-02-04 DIAGNOSIS — G89.29 CHRONIC NECK PAIN: ICD-10-CM

## 2022-02-04 DIAGNOSIS — G89.29 CHRONIC BILATERAL LOW BACK PAIN WITHOUT SCIATICA: ICD-10-CM

## 2022-02-04 DIAGNOSIS — M54.2 CHRONIC NECK PAIN: ICD-10-CM

## 2022-02-04 DIAGNOSIS — M54.50 CHRONIC BILATERAL LOW BACK PAIN WITHOUT SCIATICA: ICD-10-CM

## 2022-02-04 PROCEDURE — 97110 THERAPEUTIC EXERCISES: CPT

## 2022-02-04 PROCEDURE — 97140 MANUAL THERAPY 1/> REGIONS: CPT

## 2022-02-07 ENCOUNTER — OFFICE VISIT (OUTPATIENT)
Dept: PHYSICAL THERAPY | Age: 32
End: 2022-02-07
Attending: FAMILY MEDICINE
Payer: MEDICAID

## 2022-02-07 DIAGNOSIS — G89.29 CHRONIC NECK PAIN: ICD-10-CM

## 2022-02-07 DIAGNOSIS — M54.2 CHRONIC NECK PAIN: ICD-10-CM

## 2022-02-07 DIAGNOSIS — M54.50 CHRONIC BILATERAL LOW BACK PAIN WITHOUT SCIATICA: ICD-10-CM

## 2022-02-07 DIAGNOSIS — G89.29 CHRONIC BILATERAL LOW BACK PAIN WITHOUT SCIATICA: ICD-10-CM

## 2022-02-07 PROCEDURE — 97112 NEUROMUSCULAR REEDUCATION: CPT

## 2022-02-07 PROCEDURE — 97110 THERAPEUTIC EXERCISES: CPT

## 2022-02-07 PROCEDURE — 97140 MANUAL THERAPY 1/> REGIONS: CPT

## 2022-02-09 ENCOUNTER — OFFICE VISIT (OUTPATIENT)
Dept: PHYSICAL THERAPY | Age: 32
End: 2022-02-09
Attending: FAMILY MEDICINE
Payer: MEDICAID

## 2022-02-09 DIAGNOSIS — M54.50 CHRONIC BILATERAL LOW BACK PAIN WITHOUT SCIATICA: ICD-10-CM

## 2022-02-09 DIAGNOSIS — G89.29 CHRONIC BILATERAL LOW BACK PAIN WITHOUT SCIATICA: ICD-10-CM

## 2022-02-09 DIAGNOSIS — M54.2 CHRONIC NECK PAIN: ICD-10-CM

## 2022-02-09 DIAGNOSIS — G89.29 CHRONIC NECK PAIN: ICD-10-CM

## 2022-02-09 PROCEDURE — 97140 MANUAL THERAPY 1/> REGIONS: CPT

## 2022-02-09 PROCEDURE — 97112 NEUROMUSCULAR REEDUCATION: CPT

## 2022-02-10 ENCOUNTER — IMMUNIZATION (OUTPATIENT)
Dept: LAB | Age: 32
End: 2022-02-10
Attending: EMERGENCY MEDICINE
Payer: MEDICAID

## 2022-02-10 DIAGNOSIS — Z23 NEED FOR VACCINATION: Primary | ICD-10-CM

## 2022-02-10 PROCEDURE — 0064A SARSCOV2 VAC 50MCG/0.25ML IM: CPT

## 2022-02-14 ENCOUNTER — OFFICE VISIT (OUTPATIENT)
Dept: PHYSICAL THERAPY | Age: 32
End: 2022-02-14
Attending: FAMILY MEDICINE
Payer: MEDICAID

## 2022-02-14 DIAGNOSIS — M54.50 CHRONIC BILATERAL LOW BACK PAIN WITHOUT SCIATICA: ICD-10-CM

## 2022-02-14 DIAGNOSIS — M54.2 CHRONIC NECK PAIN: ICD-10-CM

## 2022-02-14 DIAGNOSIS — G89.29 CHRONIC NECK PAIN: ICD-10-CM

## 2022-02-14 DIAGNOSIS — G89.29 CHRONIC BILATERAL LOW BACK PAIN WITHOUT SCIATICA: ICD-10-CM

## 2022-02-14 PROCEDURE — 97112 NEUROMUSCULAR REEDUCATION: CPT

## 2022-02-14 PROCEDURE — 97140 MANUAL THERAPY 1/> REGIONS: CPT

## 2022-02-14 PROCEDURE — 97110 THERAPEUTIC EXERCISES: CPT

## 2022-02-16 ENCOUNTER — OFFICE VISIT (OUTPATIENT)
Dept: PHYSICAL THERAPY | Age: 32
End: 2022-02-16
Attending: FAMILY MEDICINE
Payer: MEDICAID

## 2022-02-16 DIAGNOSIS — M54.50 CHRONIC BILATERAL LOW BACK PAIN WITHOUT SCIATICA: ICD-10-CM

## 2022-02-16 DIAGNOSIS — G89.29 CHRONIC BILATERAL LOW BACK PAIN WITHOUT SCIATICA: ICD-10-CM

## 2022-02-16 DIAGNOSIS — G89.29 CHRONIC NECK PAIN: ICD-10-CM

## 2022-02-16 DIAGNOSIS — M54.2 CHRONIC NECK PAIN: ICD-10-CM

## 2022-02-16 PROCEDURE — 97140 MANUAL THERAPY 1/> REGIONS: CPT

## 2022-02-16 PROCEDURE — 97110 THERAPEUTIC EXERCISES: CPT

## 2022-02-17 ENCOUNTER — OFFICE VISIT (OUTPATIENT)
Dept: PHYSICAL THERAPY | Age: 32
End: 2022-02-17
Attending: FAMILY MEDICINE
Payer: MEDICAID

## 2022-02-17 PROCEDURE — 97140 MANUAL THERAPY 1/> REGIONS: CPT

## 2022-02-22 ENCOUNTER — OFFICE VISIT (OUTPATIENT)
Dept: PHYSICAL THERAPY | Age: 32
End: 2022-02-22
Attending: FAMILY MEDICINE
Payer: MEDICAID

## 2022-02-22 PROCEDURE — 97140 MANUAL THERAPY 1/> REGIONS: CPT

## 2022-02-22 PROCEDURE — 97110 THERAPEUTIC EXERCISES: CPT

## 2022-02-22 PROCEDURE — 97112 NEUROMUSCULAR REEDUCATION: CPT

## 2022-03-01 ENCOUNTER — OFFICE VISIT (OUTPATIENT)
Dept: PHYSICAL THERAPY | Age: 32
End: 2022-03-01
Attending: FAMILY MEDICINE
Payer: MEDICAID

## 2022-03-01 PROCEDURE — 97140 MANUAL THERAPY 1/> REGIONS: CPT

## 2022-03-01 PROCEDURE — 97110 THERAPEUTIC EXERCISES: CPT

## 2022-03-08 ENCOUNTER — OFFICE VISIT (OUTPATIENT)
Dept: PHYSICAL THERAPY | Age: 32
End: 2022-03-08
Attending: FAMILY MEDICINE
Payer: MEDICAID

## 2022-03-08 PROCEDURE — 97110 THERAPEUTIC EXERCISES: CPT

## 2022-03-08 PROCEDURE — 97112 NEUROMUSCULAR REEDUCATION: CPT

## 2022-03-08 PROCEDURE — 97140 MANUAL THERAPY 1/> REGIONS: CPT

## 2022-03-16 ENCOUNTER — APPOINTMENT (OUTPATIENT)
Dept: PHYSICAL THERAPY | Age: 32
End: 2022-03-16
Attending: FAMILY MEDICINE
Payer: MEDICAID

## 2022-03-17 ENCOUNTER — OFFICE VISIT (OUTPATIENT)
Dept: PHYSICAL THERAPY | Age: 32
End: 2022-03-17
Attending: FAMILY MEDICINE
Payer: MEDICAID

## 2022-03-17 PROCEDURE — 97140 MANUAL THERAPY 1/> REGIONS: CPT

## 2022-03-21 ENCOUNTER — OFFICE VISIT (OUTPATIENT)
Dept: PHYSICAL THERAPY | Age: 32
End: 2022-03-21
Attending: FAMILY MEDICINE
Payer: MEDICAID

## 2022-03-21 PROCEDURE — 97140 MANUAL THERAPY 1/> REGIONS: CPT

## 2022-03-24 ENCOUNTER — OFFICE VISIT (OUTPATIENT)
Dept: PHYSICAL THERAPY | Age: 32
End: 2022-03-24
Attending: FAMILY MEDICINE
Payer: MEDICAID

## 2022-03-24 PROCEDURE — 97140 MANUAL THERAPY 1/> REGIONS: CPT

## 2022-04-07 ENCOUNTER — OFFICE VISIT (OUTPATIENT)
Dept: PHYSICAL THERAPY | Age: 32
End: 2022-04-07
Attending: FAMILY MEDICINE
Payer: MEDICAID

## 2022-04-07 PROCEDURE — 97140 MANUAL THERAPY 1/> REGIONS: CPT

## 2022-04-13 ENCOUNTER — OFFICE VISIT (OUTPATIENT)
Dept: PHYSICAL THERAPY | Age: 32
End: 2022-04-13
Attending: FAMILY MEDICINE
Payer: MEDICAID

## 2022-04-13 PROCEDURE — 97140 MANUAL THERAPY 1/> REGIONS: CPT

## 2022-04-21 ENCOUNTER — OFFICE VISIT (OUTPATIENT)
Dept: FAMILY MEDICINE CLINIC | Facility: CLINIC | Age: 32
End: 2022-04-21
Payer: MEDICAID

## 2022-04-21 VITALS
WEIGHT: 186 LBS | BODY MASS INDEX: 29.19 KG/M2 | HEART RATE: 76 BPM | HEIGHT: 67 IN | DIASTOLIC BLOOD PRESSURE: 82 MMHG | SYSTOLIC BLOOD PRESSURE: 124 MMHG | OXYGEN SATURATION: 98 %

## 2022-04-21 DIAGNOSIS — M53.3 COCCYGEAL PAIN: ICD-10-CM

## 2022-04-21 DIAGNOSIS — M54.50 CHRONIC BILATERAL LOW BACK PAIN WITHOUT SCIATICA: Primary | ICD-10-CM

## 2022-04-21 DIAGNOSIS — G89.29 CHRONIC BILATERAL LOW BACK PAIN WITHOUT SCIATICA: Primary | ICD-10-CM

## 2022-04-21 PROCEDURE — 3074F SYST BP LT 130 MM HG: CPT | Performed by: FAMILY MEDICINE

## 2022-04-21 PROCEDURE — 3008F BODY MASS INDEX DOCD: CPT | Performed by: FAMILY MEDICINE

## 2022-04-21 PROCEDURE — 99214 OFFICE O/P EST MOD 30 MIN: CPT | Performed by: FAMILY MEDICINE

## 2022-04-21 PROCEDURE — 3079F DIAST BP 80-89 MM HG: CPT | Performed by: FAMILY MEDICINE

## 2022-04-21 RX ORDER — CYCLOBENZAPRINE HCL 5 MG
5 TABLET ORAL NIGHTLY PRN
Qty: 30 TABLET | Refills: 1 | Status: SHIPPED | OUTPATIENT
Start: 2022-04-21

## 2022-04-21 RX ORDER — LIDOCAINE 50 MG/G
1 PATCH TOPICAL DAILY PRN
Qty: 30 PATCH | Refills: 0 | Status: SHIPPED | OUTPATIENT
Start: 2022-04-21

## 2022-04-25 ENCOUNTER — OFFICE VISIT (OUTPATIENT)
Dept: OBGYN CLINIC | Facility: CLINIC | Age: 32
End: 2022-04-25
Payer: MEDICAID

## 2022-04-25 VITALS
SYSTOLIC BLOOD PRESSURE: 112 MMHG | HEIGHT: 67 IN | DIASTOLIC BLOOD PRESSURE: 80 MMHG | BODY MASS INDEX: 28.55 KG/M2 | WEIGHT: 181.88 LBS

## 2022-04-25 DIAGNOSIS — Z01.419 WOMEN'S ANNUAL ROUTINE GYNECOLOGICAL EXAMINATION: Primary | ICD-10-CM

## 2022-04-25 DIAGNOSIS — N94.6 DYSMENORRHEA: ICD-10-CM

## 2022-04-25 DIAGNOSIS — N64.4 BREAST PAIN: ICD-10-CM

## 2022-04-25 PROBLEM — G89.29 CHRONIC LOW BACK PAIN: Status: ACTIVE | Noted: 2022-04-25

## 2022-04-25 PROBLEM — M54.50 CHRONIC LOW BACK PAIN: Status: ACTIVE | Noted: 2022-04-25

## 2022-04-25 PROCEDURE — 99395 PREV VISIT EST AGE 18-39: CPT | Performed by: OBSTETRICS & GYNECOLOGY

## 2022-04-25 PROCEDURE — 3074F SYST BP LT 130 MM HG: CPT | Performed by: OBSTETRICS & GYNECOLOGY

## 2022-04-25 PROCEDURE — 87624 HPV HI-RISK TYP POOLED RSLT: CPT | Performed by: OBSTETRICS & GYNECOLOGY

## 2022-04-25 PROCEDURE — 3008F BODY MASS INDEX DOCD: CPT | Performed by: OBSTETRICS & GYNECOLOGY

## 2022-04-25 PROCEDURE — 3079F DIAST BP 80-89 MM HG: CPT | Performed by: OBSTETRICS & GYNECOLOGY

## 2022-04-25 RX ORDER — LEVONORGESTREL / ETHINYL ESTRADIOL AND ETHINYL ESTRADIOL 150-30(84)
1 KIT ORAL DAILY
Qty: 90 TABLET | Refills: 3 | Status: SHIPPED | OUTPATIENT
Start: 2022-04-25 | End: 2023-04-20

## 2022-04-26 LAB — HPV I/H RISK 1 DNA SPEC QL NAA+PROBE: NEGATIVE

## 2022-05-02 RX ORDER — DEXTROAMPHETAMINE SACCHARATE, AMPHETAMINE ASPARTATE MONOHYDRATE, DEXTROAMPHETAMINE SULFATE AND AMPHETAMINE SULFATE 2.5; 2.5; 2.5; 2.5 MG/1; MG/1; MG/1; MG/1
10 CAPSULE, EXTENDED RELEASE ORAL EVERY MORNING
Qty: 30 CAPSULE | Refills: 0 | Status: SHIPPED | OUTPATIENT
Start: 2022-05-02

## 2022-05-02 NOTE — TELEPHONE ENCOUNTER
Please review. Protocol failed or does not have a protocol. Requested Prescriptions   Pending Prescriptions Disp Refills    amphetamine-dextroamphetamine ER 10 MG Oral Capsule SR 24 Hr 30 capsule 0     Sig: Take 1 capsule (10 mg total) by mouth every morning.         There is no refill protocol information for this order         Future Appointments         Provider Department Appt Notes    In 1 month Sabrinagladys, [facility], [address] [place] [address], [address], [place] Back and joint pain, current providers and pt recommend a work up    In 1 month Tonny Em MD [address]-Physiatr History of Back pain recently made worse by pt          Recent Outpatient Visits              1 week ago [employer] annual routine gynecological examination    [facility], Daniel Wynn MD    Office Visit    1 week ago Chronic bilateral low back pain without sciatica    [employer], [address], [facility] [address] Clarissa Hart DO    Office Visit    2 weeks ago     [facility], [place], [place]    Office Visit    3 weeks ago     [address] [place]    Office Visit    1 month ago     [address] [address] [place]    Office Visit

## 2022-05-24 RX ORDER — LEVONORGESTREL / ETHINYL ESTRADIOL AND ETHINYL ESTRADIOL 150-30(84)
1 KIT ORAL DAILY
Qty: 91 TABLET | Refills: 3 | Status: SHIPPED | OUTPATIENT
Start: 2022-05-24

## 2022-06-01 ENCOUNTER — HOSPITAL ENCOUNTER (OUTPATIENT)
Dept: GENERAL RADIOLOGY | Facility: HOSPITAL | Age: 32
Discharge: HOME | End: 2022-06-01
Attending: INTERNAL MEDICINE
Payer: MEDICAID

## 2022-06-01 ENCOUNTER — OFFICE VISIT (OUTPATIENT)
Dept: RHEUMATOLOGY | Facility: CLINIC | Age: 32
End: 2022-06-01
Payer: MEDICAID

## 2022-06-01 VITALS
HEIGHT: 67 IN | RESPIRATION RATE: 16 BRPM | DIASTOLIC BLOOD PRESSURE: 84 MMHG | BODY MASS INDEX: 29.19 KG/M2 | WEIGHT: 186 LBS | SYSTOLIC BLOOD PRESSURE: 125 MMHG | HEART RATE: 89 BPM

## 2022-06-01 DIAGNOSIS — G89.29 CHRONIC BILATERAL LOW BACK PAIN WITHOUT SCIATICA: ICD-10-CM

## 2022-06-01 DIAGNOSIS — M79.10 MYALGIA: ICD-10-CM

## 2022-06-01 DIAGNOSIS — M54.50 CHRONIC BILATERAL LOW BACK PAIN WITHOUT SCIATICA: ICD-10-CM

## 2022-06-01 DIAGNOSIS — E55.9 VITAMIN D DEFICIENCY: ICD-10-CM

## 2022-06-01 DIAGNOSIS — M25.50 POLYARTHRALGIA: Primary | ICD-10-CM

## 2022-06-01 PROCEDURE — 99244 OFF/OP CNSLTJ NEW/EST MOD 40: CPT | Performed by: INTERNAL MEDICINE

## 2022-06-01 PROCEDURE — 3008F BODY MASS INDEX DOCD: CPT | Performed by: INTERNAL MEDICINE

## 2022-06-01 PROCEDURE — 72202 X-RAY EXAM SI JOINTS 3/> VWS: CPT | Performed by: INTERNAL MEDICINE

## 2022-06-01 PROCEDURE — 3074F SYST BP LT 130 MM HG: CPT | Performed by: INTERNAL MEDICINE

## 2022-06-01 PROCEDURE — 3079F DIAST BP 80-89 MM HG: CPT | Performed by: INTERNAL MEDICINE

## 2022-06-01 RX ORDER — PREGABALIN 50 MG/1
50 CAPSULE ORAL NIGHTLY
Qty: 30 CAPSULE | Refills: 1 | Status: SHIPPED | OUTPATIENT
Start: 2022-06-01

## 2022-06-06 ENCOUNTER — LAB ENCOUNTER (OUTPATIENT)
Dept: LAB | Facility: HOSPITAL | Age: 32
End: 2022-06-06
Attending: INTERNAL MEDICINE
Payer: MEDICAID

## 2022-06-06 DIAGNOSIS — M54.50 CHRONIC BILATERAL LOW BACK PAIN WITHOUT SCIATICA: ICD-10-CM

## 2022-06-06 DIAGNOSIS — G89.29 CHRONIC BILATERAL LOW BACK PAIN WITHOUT SCIATICA: ICD-10-CM

## 2022-06-06 DIAGNOSIS — M25.50 POLYARTHRALGIA: ICD-10-CM

## 2022-06-06 DIAGNOSIS — E55.9 VITAMIN D DEFICIENCY: ICD-10-CM

## 2022-06-06 DIAGNOSIS — M79.10 MYALGIA: ICD-10-CM

## 2022-06-06 LAB
CK SERPL-CCNC: 229 U/L
CRP SERPL-MCNC: 0.97 MG/DL (ref ?–0.3)
ERYTHROCYTE [SEDIMENTATION RATE] IN BLOOD: 12 MM/HR
RHEUMATOID FACT SERPL-ACNC: <10 IU/ML (ref ?–15)
VIT D+METAB SERPL-MCNC: 65.8 NG/ML (ref 30–100)

## 2022-06-06 PROCEDURE — 86431 RHEUMATOID FACTOR QUANT: CPT

## 2022-06-06 PROCEDURE — 36415 COLL VENOUS BLD VENIPUNCTURE: CPT

## 2022-06-06 PROCEDURE — 82306 VITAMIN D 25 HYDROXY: CPT

## 2022-06-06 PROCEDURE — 85652 RBC SED RATE AUTOMATED: CPT

## 2022-06-06 PROCEDURE — 86235 NUCLEAR ANTIGEN ANTIBODY: CPT

## 2022-06-06 PROCEDURE — 86200 CCP ANTIBODY: CPT

## 2022-06-06 PROCEDURE — 82085 ASSAY OF ALDOLASE: CPT

## 2022-06-06 PROCEDURE — 86140 C-REACTIVE PROTEIN: CPT

## 2022-06-06 PROCEDURE — 81374 HLA I TYPING 1 ANTIGEN LR: CPT

## 2022-06-06 PROCEDURE — 82550 ASSAY OF CK (CPK): CPT

## 2022-06-07 LAB — ALDOLASE, SERUM: 4.9 U/L

## 2022-06-08 ENCOUNTER — OFFICE VISIT (OUTPATIENT)
Dept: PHYSICAL MEDICINE AND REHAB | Facility: CLINIC | Age: 32
End: 2022-06-08
Payer: MEDICAID

## 2022-06-08 ENCOUNTER — TELEPHONE (OUTPATIENT)
Dept: NEUROLOGY | Facility: CLINIC | Age: 32
End: 2022-06-08

## 2022-06-08 VITALS
WEIGHT: 189 LBS | BODY MASS INDEX: 29.66 KG/M2 | DIASTOLIC BLOOD PRESSURE: 82 MMHG | OXYGEN SATURATION: 98 % | RESPIRATION RATE: 16 BRPM | HEIGHT: 67 IN | SYSTOLIC BLOOD PRESSURE: 120 MMHG | HEART RATE: 90 BPM

## 2022-06-08 DIAGNOSIS — M54.41 CHRONIC BILATERAL LOW BACK PAIN WITH BILATERAL SCIATICA: Primary | ICD-10-CM

## 2022-06-08 DIAGNOSIS — M54.42 CHRONIC BILATERAL LOW BACK PAIN WITH BILATERAL SCIATICA: Primary | ICD-10-CM

## 2022-06-08 DIAGNOSIS — M54.16 LUMBAR RADICULOPATHY: ICD-10-CM

## 2022-06-08 DIAGNOSIS — G89.29 CHRONIC BILATERAL LOW BACK PAIN WITH BILATERAL SCIATICA: Primary | ICD-10-CM

## 2022-06-08 DIAGNOSIS — M51.9 LUMBAR DISC DISEASE: ICD-10-CM

## 2022-06-08 DIAGNOSIS — F31.81 BIPOLAR II DISORDER (HCC): ICD-10-CM

## 2022-06-08 DIAGNOSIS — M54.16 LUMBAR RADICULOPATHY: Primary | ICD-10-CM

## 2022-06-08 PROCEDURE — 3074F SYST BP LT 130 MM HG: CPT | Performed by: PHYSICAL MEDICINE & REHABILITATION

## 2022-06-08 PROCEDURE — 3008F BODY MASS INDEX DOCD: CPT | Performed by: PHYSICAL MEDICINE & REHABILITATION

## 2022-06-08 PROCEDURE — 99204 OFFICE O/P NEW MOD 45 MIN: CPT | Performed by: PHYSICAL MEDICINE & REHABILITATION

## 2022-06-08 PROCEDURE — 3079F DIAST BP 80-89 MM HG: CPT | Performed by: PHYSICAL MEDICINE & REHABILITATION

## 2022-06-08 NOTE — TELEPHONE ENCOUNTER
[employer] for authorization of approval for Bilateral S1 TFESI cpt codes [id number], [id number]. Authorization Number: [id number] valid 6/8/2022 - 8/7/2022. Will inform Nursing.

## 2022-06-08 NOTE — PATIENT INSTRUCTIONS
Plan  I will do bilateral S1 TFESI's. She will get into PT for Ashley therapy. The patient will follow up in 2 months, but the patient will call me 2 weeks after having the injection to let me know how the injection worked.

## 2022-06-08 NOTE — TELEPHONE ENCOUNTER
Patient has been scheduled for Bilateral S1 TFESI  on 07/08/22 at the [address] [place] [address] with [address] [place]. -Anesthesia type: LOCAL. -If receiving MAC or IVC sedation patient will need to get COVID tested 3 days prior even if already vaccinated (order placed by [address]  -If scheduling [address] [place] [address] covid testing required for all procedures whether patient is vaccinated or not. -Patient informed not to eat or drink anything after midnight the night prior to the procedure, if being sedated. -Patient was advised that if he/she does receive the covid vaccine it needs to be at least 2 weeks before or after the injection. -Medications and allergies reviewed. -Patient reminded to hold NSAIDs (Ibuprofen, ASA 81, Aleve, Naproxen, Mobic etc.) for 3 days prior to [place]  if BMI is greater than 35. For Cervical injections only hold multivitamins, Vitamin E, Fish Oil, Phentermine (Lomaira) for 7 days prior to injection and NSAIDS.  mg to be held for 7 days prior to injections.  -If patient is receiving MAC/IVCS Phentermine Cliff Luanne) will need to be held for 7 days prior to injection.  -If on blood thinner clearance has been received to hold this medication by provider.   -Patient informed he/she will need a  to and from procedure. -[facility] is located in the [facility] 1st floor. Patient may park in the yellow parking. Patient verbalized understanding and agrees with plan.  -----> Scheduled in Epic: Yes  -----> Scheduled in [employer]:  Yes

## 2022-06-09 ENCOUNTER — TELEPHONE (OUTPATIENT)
Dept: NEUROLOGY | Facility: CLINIC | Age: 32
End: 2022-06-09

## 2022-06-09 LAB — HLA-B27: NEGATIVE

## 2022-06-10 LAB — CCP IGG SERPL-ACNC: 1.6 U/ML (ref 0–6.9)

## 2022-06-10 RX ORDER — TRAMADOL HYDROCHLORIDE 50 MG/1
50 TABLET ORAL EVERY 6 HOURS PRN
Qty: 30 TABLET | Refills: 0 | Status: SHIPPED | OUTPATIENT
Start: 2022-06-10 | End: 2022-07-10

## 2022-06-10 NOTE — TELEPHONE ENCOUNTER
Left detailed message for patient about new rx. She can call back if no improvement after taking it.

## 2022-06-15 ENCOUNTER — OFFICE VISIT (OUTPATIENT)
Dept: RHEUMATOLOGY | Facility: CLINIC | Age: 32
End: 2022-06-15
Payer: MEDICAID

## 2022-06-15 VITALS
BODY MASS INDEX: 29.66 KG/M2 | DIASTOLIC BLOOD PRESSURE: 82 MMHG | RESPIRATION RATE: 16 BRPM | HEART RATE: 91 BPM | WEIGHT: 189 LBS | SYSTOLIC BLOOD PRESSURE: 116 MMHG | HEIGHT: 67 IN

## 2022-06-15 DIAGNOSIS — G89.29 CHRONIC BILATERAL LOW BACK PAIN WITHOUT SCIATICA: ICD-10-CM

## 2022-06-15 DIAGNOSIS — M54.50 CHRONIC BILATERAL LOW BACK PAIN WITHOUT SCIATICA: ICD-10-CM

## 2022-06-15 DIAGNOSIS — M79.7 FIBROMYALGIA: Primary | ICD-10-CM

## 2022-06-15 LAB
ENA SS-A AB SER-ACNC: <100 AU/ML (ref ?–100)
ENA SS-B AB SER-ACNC: <100 AU/ML (ref ?–100)

## 2022-06-15 PROCEDURE — 3079F DIAST BP 80-89 MM HG: CPT | Performed by: INTERNAL MEDICINE

## 2022-06-15 PROCEDURE — 3074F SYST BP LT 130 MM HG: CPT | Performed by: INTERNAL MEDICINE

## 2022-06-15 PROCEDURE — 99214 OFFICE O/P EST MOD 30 MIN: CPT | Performed by: INTERNAL MEDICINE

## 2022-06-15 PROCEDURE — 3008F BODY MASS INDEX DOCD: CPT | Performed by: INTERNAL MEDICINE

## 2022-06-15 NOTE — PATIENT INSTRUCTIONS
1. Wait til after epidural injections   2. Try pregabilin 50mg at night - - call if you want to increase the dose to 75mg   3. cyclboenzaprine 5-10mg at night   4. cannabis for pain  5. Return to clinic in 3 months.

## 2022-06-16 ENCOUNTER — TELEPHONE (OUTPATIENT)
Dept: PHYSICAL MEDICINE AND REHAB | Facility: CLINIC | Age: 32
End: 2022-06-16

## 2022-06-16 DIAGNOSIS — F90.9 ATTENTION DEFICIT HYPERACTIVITY DISORDER (ADHD), UNSPECIFIED ADHD TYPE: ICD-10-CM

## 2022-06-16 RX ORDER — DEXTROAMPHETAMINE SACCHARATE, AMPHETAMINE ASPARTATE MONOHYDRATE, DEXTROAMPHETAMINE SULFATE AND AMPHETAMINE SULFATE 2.5; 2.5; 2.5; 2.5 MG/1; MG/1; MG/1; MG/1
10 CAPSULE, EXTENDED RELEASE ORAL EVERY MORNING
Qty: 30 CAPSULE | Refills: 0 | Status: SHIPPED | OUTPATIENT
Start: 2022-06-16

## 2022-06-16 RX ORDER — CYCLOBENZAPRINE HCL 5 MG
5 TABLET ORAL NIGHTLY PRN
Qty: 30 TABLET | Refills: 1 | Status: SHIPPED | OUTPATIENT
Start: 2022-06-16

## 2022-06-16 NOTE — TELEPHONE ENCOUNTER
I do not recommend 10mg dose. If she is needing the flexeril daily or if the 5mg isn't enough, needs to f/u with specialist for pain management. Flexeril is not a realistic long-term option for pain, and should not be taken daily for extended periods of time.

## 2022-06-16 NOTE — TELEPHONE ENCOUNTER
LOV: 6/8/22  Next injection: 7/8/22  NOV: 8/17/22    Patient currently taking Tramadol 50 mg- 1 tab every 6 hrs prn  Lyrica 50 mg- 1 cap at bedtime      Spoke with patient who stated the Tramadol helps for about an hour. Before taking Tramadol pain is about 6-7/10 and for the hour afterwards gets down to 1/10. Informed patient update will be relayed to [address] for further recommendations. Patient was appreciative. Awaiting recommendations from [address]

## 2022-06-16 NOTE — TELEPHONE ENCOUNTER
Per [facility]: \"she can take 2 of them at one time , but to take more than 8 in one day\"    Spoke with patient and relayed above information. Patient was appreciative. Pharmacy verified. Tramadol rx called in to pharmacy. Rx routed to [facility] to co-sign.

## 2022-06-16 NOTE — TELEPHONE ENCOUNTER
Pt would like to know if Dr Beryl Cruz can prescribe a stronger medication than (traMADol (ULTRAM) 50 MG Oral Tab)  she is do for refill and said its not that strong.

## 2022-06-17 RX ORDER — TRAMADOL HYDROCHLORIDE 50 MG/1
100 TABLET ORAL EVERY 6 HOURS PRN
Qty: 240 TABLET | Refills: 0 | OUTPATIENT
Start: 2022-06-17

## 2022-06-21 LAB
ENA SS-A AB SER-ACNC: <100 AU/ML (ref ?–100)
ENA SS-B AB SER-ACNC: <100 AU/ML (ref ?–100)

## 2022-06-27 ENCOUNTER — TELEPHONE (OUTPATIENT)
Dept: FAMILY MEDICINE CLINIC | Facility: CLINIC | Age: 32
End: 2022-06-27

## 2022-06-27 NOTE — TELEPHONE ENCOUNTER
Initiated PA for amphetamine-dextroamphetamine ER 10 MG Oral Capsule SR 24 Hr through [employer] [employer]. Await outcome.

## 2022-06-28 ENCOUNTER — MED REC SCAN ONLY (OUTPATIENT)
Dept: FAMILY MEDICINE CLINIC | Facility: CLINIC | Age: 32
End: 2022-06-28

## 2022-06-28 NOTE — TELEPHONE ENCOUNTER
Received fax from [address] [place] [address],[address] requesting chart notes. Faxed chart notes from 02/28/2022 and 12/23/2021 office visits back to [phone]. Await outcome.

## 2022-07-06 RX ORDER — SODIUM CHLORIDE, SODIUM LACTATE, POTASSIUM CHLORIDE, CALCIUM CHLORIDE 600; 310; 30; 20 MG/100ML; MG/100ML; MG/100ML; MG/100ML
INJECTION, SOLUTION INTRAVENOUS CONTINUOUS
Status: CANCELLED | OUTPATIENT
Start: 2022-07-06

## 2022-07-06 RX ORDER — ACETAMINOPHEN 500 MG
1000 TABLET ORAL ONCE
Status: CANCELLED | OUTPATIENT
Start: 2022-07-06 | End: 2022-07-06

## 2022-07-08 ENCOUNTER — APPOINTMENT (OUTPATIENT)
Dept: GENERAL RADIOLOGY | Facility: HOSPITAL | Age: 32
End: 2022-07-08
Attending: PHYSICAL MEDICINE & REHABILITATION
Payer: MEDICAID

## 2022-07-08 ENCOUNTER — HOSPITAL ENCOUNTER (OUTPATIENT)
Facility: HOSPITAL | Age: 32
Setting detail: HOSPITAL OUTPATIENT SURGERY
Discharge: HOME | End: 2022-07-08
Attending: PHYSICAL MEDICINE & REHABILITATION | Admitting: PHYSICAL MEDICINE & REHABILITATION
Payer: MEDICAID

## 2022-07-08 VITALS
WEIGHT: 182 LBS | HEART RATE: 98 BPM | RESPIRATION RATE: 16 BRPM | BODY MASS INDEX: 28.56 KG/M2 | HEIGHT: 67 IN | TEMPERATURE: 99 F | OXYGEN SATURATION: 98 % | DIASTOLIC BLOOD PRESSURE: 87 MMHG | SYSTOLIC BLOOD PRESSURE: 141 MMHG

## 2022-07-08 DIAGNOSIS — M54.16 LUMBAR RADICULOPATHY: ICD-10-CM

## 2022-07-08 LAB — B-HCG UR QL: NEGATIVE

## 2022-07-08 PROCEDURE — 3E0R3BZ INTRODUCTION OF ANESTHETIC AGENT INTO SPINAL CANAL, PERCUTANEOUS APPROACH: ICD-10-PCS | Performed by: PHYSICAL MEDICINE & REHABILITATION

## 2022-07-08 PROCEDURE — 3E0R33Z INTRODUCTION OF ANTI-INFLAMMATORY INTO SPINAL CANAL, PERCUTANEOUS APPROACH: ICD-10-PCS | Performed by: PHYSICAL MEDICINE & REHABILITATION

## 2022-07-08 PROCEDURE — 64483 NJX AA&/STRD TFRM EPI L/S 1: CPT | Performed by: PHYSICAL MEDICINE & REHABILITATION

## 2022-07-08 RX ORDER — TRIAMCINOLONE ACETONIDE 40 MG/ML
INJECTION, SUSPENSION INTRA-ARTICULAR; INTRAMUSCULAR AS NEEDED
Status: DISCONTINUED | OUTPATIENT
Start: 2022-07-08 | End: 2022-07-08 | Stop reason: HOSPADM

## 2022-07-08 RX ORDER — LIDOCAINE HYDROCHLORIDE 10 MG/ML
INJECTION, SOLUTION EPIDURAL; INFILTRATION; INTRACAUDAL; PERINEURAL AS NEEDED
Status: DISCONTINUED | OUTPATIENT
Start: 2022-07-08 | End: 2022-07-08 | Stop reason: HOSPADM

## 2022-07-08 NOTE — OPERATIVE REPORT
[facility] Operative Report    BILATERAL S1 TFESI   NAME:  Margaret Herrmann    MR #:    [id number] :  1990     PHYSICIAN:  Zuleika Em MD        Operative Report    DATE OF PROCEDURE: 2022   PREOPERATIVE DIAGNOSES: Problem   right > left S1 radiculopathy   L5-S1 right & right paracentral mild bulging disc       POSTOPERATIVE DIAGNOSES:   same    PROCEDURES: Bilateral S1 transforaminal epidural steroid injections done under fluoroscopic guidance with contrast enhancement. SURGEON: Zuleika Em MD   ANESTHESIA: Local   INDICATIONS:      OPERATIVE PROCEDURE:  Written consent was obtained from the patient. The patient was brought into the operating room and placed in the prone position on the fluoroscopy table with pillow underneath her abdomen. The patient's skin was cleaned and draped in a normal sterile fashion. Using AP fluoroscopy, all five lumbar vertebrae were identified. Then the bilateral first pedicles were identified with the bilateral first sacral foramen inferior to them. Then, 3.5 inch inch, 22-gauge spinal needles were inserted and directed towards the bilateral first sacral foramen. When they were felt to be in good position, Omnipaque-240 contrast was used to obtain a good epidurograms indicating correct needle placement. Then, aspiration was performed. No blood, fluid, or air was aspirated. Then, the patient was injected with a 3 cc solution of 1.5 cc of 40 mg/cc of Kenalog and 1.5 cc of 1% PF lidocaine without epinephrine on each side. After this, the needles were removed. The patient's skin was cleaned. Band-Aids were applied. The patient was transferred to the cart and into [facility]. The patient was given discharge instructions and will follow up in the clinic as scheduled. Throughout the whole procedure, the patient's pulse oximetry and vital signs were monitored and they remained completely stable.   Also, throughout the whole procedure, prior to injection of any medication, aspiration was performed. No blood, fluid, or air was aspirated at anytime.

## 2022-07-08 NOTE — DISCHARGE SUMMARY
[place] [facility]    Discharge Summary    Nasrin Cantor Patient Status:  Hospital Outpatient Surgery    1990 MRN [id number]   Location [facility] PRE OP RECOVERY Attending Bre Rowland MD   Hosp Day # 0 PCP Khushboo Ruth DO     Date of Admission: 2022 Disposition: Home or Self Care     Date of Discharge: 2022    Admitting Diagnosis: Lumbar radiculopathy [M54.16]    Discharge Diagnosis: Patient Active Problem List:     Irritable bowel syndrome     Anxiety     ADHD     Depression     Raynaud's disease without gangrene     Vasovagal syndrome     Orthostatic hypotension     Vasovagal syncope     Migraine aura, persistent     Recurrent cold sores     Chronic neck pain     Sciatica of right side     Chronic pain of right wrist     Bipolar II disorder (HCC)     Women's annual routine gynecological examination     Contraceptive education     Idiopathic urticaria     Breast pain     Chronic low back pain     Dysmenorrhea     L5-S1 right & right paracentral mild bulging disc     right > left S1 radiculopathy      Procedures: bilateral S1 TFESI's    Complications: none    Discharge Condition: Stable    Discharge Medications:      Discharge Medications      ASK your doctor about these medications      Instructions Prescription details   albuterol 108 (90 Base) MCG/ACT Aers  Commonly known as: Ventolin HFA      INHALE 2 PUFFS INTO THE LUNGS EVERY 6 HOURS AS NEEDED FOR SHORTNESS OF BREATH OR WHEEZING   Quantity: 18 g  Refills: 0     albuterol (2.5 MG/3ML) 0.083% Nebu  Commonly known as: Ventolin      Inhale 3 mL (2.5 mg) by nebulization - Unspecified route, every 4-6 hours PRNs   Quantity: 30 each  Refills: 0     amphetamine-dextroamphetamine ER 10 MG Cp24  Commonly known as: Adderall XR      Take 1 capsule (10 mg total) by mouth every morning.    Quantity: 30 capsule  Refills: 0     Budesonide-Formoterol Fumarate 160-4.5 MCG/ACT Aero  Commonly known as: Symbicort      Inhale 2 puffs into the lungs 2 (two) times daily. Quantity: 3 each  Refills: 0     cyclobenzaprine 5 MG Tabs  Commonly known as: Flexeril      Take 1 tablet (5 mg total) by mouth nightly as needed for Muscle spasms. Quantity: 30 tablet  Refills: 1     Levonorgest-Eth Estrad 91-Day 0.15-0.03 &0.01 MG Tabs  Commonly known as: Simpesse      Take 1 tablet by mouth daily. Quantity: 91 tablet  Refills: 3     lidocaine 5 % Ptch  Commonly known as: Lidoderm      Place 1 patch onto the skin daily as needed. Quantity: 30 patch  Refills: 0     pregabalin 50 MG Caps  Commonly known as: Lyrica      Take 1 capsule (50 mg total) by mouth at bedtime. Quantity: 30 capsule  Refills: 1     traMADol 50 MG Tabs  Commonly known as: Ultram      Take 2 tablets (100 mg total) by mouth every 6 (six) hours as needed for Pain. Max 8 tablets daily. Quantity: 240 tablet  Refills: 0            Follow up Visits: Follow-up with Dr. Pattie Martinez in 10 days with a phone call and in clinic as scheduled. Other Discharge Instructions: ice 20 minutes on and 20 minutes off for 2 hours and then as needed for the pain. Remove dressing in 24 hours. Umair Martinez MD  7/8/2022  2:09 PM

## 2022-07-18 ENCOUNTER — OFFICE VISIT (OUTPATIENT)
Dept: PHYSICAL THERAPY | Age: 32
End: 2022-07-18
Attending: PHYSICAL MEDICINE & REHABILITATION
Payer: MEDICAID

## 2022-07-18 DIAGNOSIS — M54.16 LUMBAR RADICULOPATHY: ICD-10-CM

## 2022-07-18 DIAGNOSIS — M51.9 LUMBAR DISC DISEASE: ICD-10-CM

## 2022-07-18 DIAGNOSIS — F31.81 BIPOLAR II DISORDER (HCC): ICD-10-CM

## 2022-07-18 DIAGNOSIS — G89.29 CHRONIC BILATERAL LOW BACK PAIN WITH BILATERAL SCIATICA: ICD-10-CM

## 2022-07-18 DIAGNOSIS — M54.41 CHRONIC BILATERAL LOW BACK PAIN WITH BILATERAL SCIATICA: ICD-10-CM

## 2022-07-18 DIAGNOSIS — M54.42 CHRONIC BILATERAL LOW BACK PAIN WITH BILATERAL SCIATICA: ICD-10-CM

## 2022-07-18 PROCEDURE — 97161 PT EVAL LOW COMPLEX 20 MIN: CPT

## 2022-07-18 PROCEDURE — 97110 THERAPEUTIC EXERCISES: CPT

## 2022-07-21 ENCOUNTER — TELEPHONE (OUTPATIENT)
Dept: PHYSICAL MEDICINE AND REHAB | Facility: CLINIC | Age: 32
End: 2022-07-21

## 2022-07-21 ENCOUNTER — OFFICE VISIT (OUTPATIENT)
Dept: PHYSICAL THERAPY | Age: 32
End: 2022-07-21
Attending: PHYSICAL MEDICINE & REHABILITATION
Payer: MEDICAID

## 2022-07-21 DIAGNOSIS — M54.41 CHRONIC BILATERAL LOW BACK PAIN WITH BILATERAL SCIATICA: ICD-10-CM

## 2022-07-21 DIAGNOSIS — M54.16 LUMBAR RADICULOPATHY: ICD-10-CM

## 2022-07-21 DIAGNOSIS — M54.42 CHRONIC BILATERAL LOW BACK PAIN WITH BILATERAL SCIATICA: ICD-10-CM

## 2022-07-21 DIAGNOSIS — M51.9 LUMBAR DISC DISEASE: ICD-10-CM

## 2022-07-21 DIAGNOSIS — F31.81 BIPOLAR II DISORDER (HCC): ICD-10-CM

## 2022-07-21 DIAGNOSIS — G89.29 CHRONIC BILATERAL LOW BACK PAIN WITH BILATERAL SCIATICA: ICD-10-CM

## 2022-07-21 PROCEDURE — 97140 MANUAL THERAPY 1/> REGIONS: CPT

## 2022-07-21 PROCEDURE — 97110 THERAPEUTIC EXERCISES: CPT

## 2022-07-21 NOTE — TELEPHONE ENCOUNTER
Condition update after Procedure. - Patient had Bilateral S1 Transforaminal epidural steroid injection on 07/08/2022 with Dr. Ronit Mcdonough. - 75% relief, tingling with complete relief, intermittent numbness remains to anterior lower LEFT calf radiating down to ankle. If pain is worse:  - Pain level prior to procedure:   8  - Current pain level:  4    - Pain location: mid lower back, bilateral hips (R>L). - Pain description: aching and throbbing  - Current pain treatment: heat/ice, PT and CBD topically, CBD orally, CBG orally for inflammation  - Current prescription pain medications/dosing: Patient cannot tramadol, patient has not been taking cyclobenzaprine d/t pharmacist telling her d/t reaction to amitriptyline she may have reaction to cyclobenzaprine    - LOV: 7/8/2022 Lynsey English MD    - NOV: 8/17/2022 Lynsey Englihs MD   - Plan from LOV: Per Dr. Jeremy Madisonlisa Lawton will get into PT for Ashley therapy. The patient will follow up in 2 months, but the patient will call me 2 weeks after having the injection to let me know how the injection worked. \"    - Cannot take tramadol d/t severe nausea, patient also reporting she could have been nauseous d/t severe post op pain for several days post procedure. - Patient reporting severe itching to the injection site starting yesterday 7/20/2022. Denies purulent discharge, denies fever symptoms. Has not taken any medication for that or applied any product to that area. Patient has hx of \"Idiopathic urticaria. \"  - Patient reporting low grade fever of 99 for 3 days post procedure. Denies fevers since. - Patient reporting x1 episode of urinary incontinence x5 days ago. Increasing urinary urgency and bowel urgency. Denies bowel incontinence. Will discuss updates with Dr. Jeremy Mcwilliams at this time.

## 2022-07-21 NOTE — TELEPHONE ENCOUNTER
Bowel and bladder urgency started 7/10/22 or 7/11/22. Bladder incontinence happened about 7/13/2022. Since starting has not gotten worse but stayed the exact same without improvement. Patient has no personal history of incontinence. Per Dr. Salvador Silverio: If patient has any episode of bowel incontinence - patient is to go to the ER immediately. If patient has any additional episodes of urinary incontinence - patient is to call our office immediately so we can set her up for an appointment as soon as possible. Patient verbalized understanding by repeating instructions back and was thankful.

## 2022-07-25 ENCOUNTER — OFFICE VISIT (OUTPATIENT)
Dept: PHYSICAL THERAPY | Age: 32
End: 2022-07-25
Attending: PHYSICAL MEDICINE & REHABILITATION
Payer: MEDICAID

## 2022-07-25 DIAGNOSIS — M54.16 LUMBAR RADICULOPATHY: ICD-10-CM

## 2022-07-25 DIAGNOSIS — M54.41 CHRONIC BILATERAL LOW BACK PAIN WITH BILATERAL SCIATICA: ICD-10-CM

## 2022-07-25 DIAGNOSIS — M51.9 LUMBAR DISC DISEASE: ICD-10-CM

## 2022-07-25 DIAGNOSIS — M54.42 CHRONIC BILATERAL LOW BACK PAIN WITH BILATERAL SCIATICA: ICD-10-CM

## 2022-07-25 DIAGNOSIS — G89.29 CHRONIC BILATERAL LOW BACK PAIN WITH BILATERAL SCIATICA: ICD-10-CM

## 2022-07-25 DIAGNOSIS — F31.81 BIPOLAR II DISORDER (HCC): ICD-10-CM

## 2022-07-25 PROCEDURE — 97110 THERAPEUTIC EXERCISES: CPT

## 2022-07-25 PROCEDURE — 97140 MANUAL THERAPY 1/> REGIONS: CPT

## 2022-07-27 ENCOUNTER — OFFICE VISIT (OUTPATIENT)
Dept: PHYSICAL THERAPY | Age: 32
End: 2022-07-27
Attending: PHYSICAL MEDICINE & REHABILITATION
Payer: MEDICAID

## 2022-07-27 DIAGNOSIS — M54.16 LUMBAR RADICULOPATHY: ICD-10-CM

## 2022-07-27 DIAGNOSIS — M54.41 CHRONIC BILATERAL LOW BACK PAIN WITH BILATERAL SCIATICA: ICD-10-CM

## 2022-07-27 DIAGNOSIS — G89.29 CHRONIC BILATERAL LOW BACK PAIN WITH BILATERAL SCIATICA: ICD-10-CM

## 2022-07-27 DIAGNOSIS — F31.81 BIPOLAR II DISORDER (HCC): ICD-10-CM

## 2022-07-27 DIAGNOSIS — M51.9 LUMBAR DISC DISEASE: ICD-10-CM

## 2022-07-27 DIAGNOSIS — M54.42 CHRONIC BILATERAL LOW BACK PAIN WITH BILATERAL SCIATICA: ICD-10-CM

## 2022-07-27 PROCEDURE — 97110 THERAPEUTIC EXERCISES: CPT

## 2022-07-27 PROCEDURE — 97140 MANUAL THERAPY 1/> REGIONS: CPT

## 2022-08-01 ENCOUNTER — OFFICE VISIT (OUTPATIENT)
Dept: PHYSICAL THERAPY | Age: 32
End: 2022-08-01
Attending: PHYSICAL MEDICINE & REHABILITATION
Payer: MEDICAID

## 2022-08-01 DIAGNOSIS — M54.16 LUMBAR RADICULOPATHY: ICD-10-CM

## 2022-08-01 DIAGNOSIS — G89.29 CHRONIC BILATERAL LOW BACK PAIN WITH BILATERAL SCIATICA: ICD-10-CM

## 2022-08-01 DIAGNOSIS — F31.81 BIPOLAR II DISORDER (HCC): ICD-10-CM

## 2022-08-01 DIAGNOSIS — M51.9 LUMBAR DISC DISEASE: ICD-10-CM

## 2022-08-01 DIAGNOSIS — M54.42 CHRONIC BILATERAL LOW BACK PAIN WITH BILATERAL SCIATICA: ICD-10-CM

## 2022-08-01 DIAGNOSIS — M54.41 CHRONIC BILATERAL LOW BACK PAIN WITH BILATERAL SCIATICA: ICD-10-CM

## 2022-08-01 PROCEDURE — 97110 THERAPEUTIC EXERCISES: CPT

## 2022-08-02 DIAGNOSIS — F90.9 ATTENTION DEFICIT HYPERACTIVITY DISORDER (ADHD), UNSPECIFIED ADHD TYPE: ICD-10-CM

## 2022-08-03 RX ORDER — LIDOCAINE 50 MG/G
1 PATCH TOPICAL DAILY PRN
Qty: 30 PATCH | Refills: 0 | Status: SHIPPED | OUTPATIENT
Start: 2022-08-03

## 2022-08-03 RX ORDER — DEXTROAMPHETAMINE SACCHARATE, AMPHETAMINE ASPARTATE MONOHYDRATE, DEXTROAMPHETAMINE SULFATE AND AMPHETAMINE SULFATE 2.5; 2.5; 2.5; 2.5 MG/1; MG/1; MG/1; MG/1
10 CAPSULE, EXTENDED RELEASE ORAL EVERY MORNING
Qty: 30 CAPSULE | Refills: 0 | Status: SHIPPED | OUTPATIENT
Start: 2022-08-03

## 2022-08-04 ENCOUNTER — APPOINTMENT (OUTPATIENT)
Dept: PHYSICAL THERAPY | Age: 32
End: 2022-08-04
Payer: MEDICAID

## 2022-08-04 ENCOUNTER — OFFICE VISIT (OUTPATIENT)
Dept: PHYSICAL THERAPY | Age: 32
End: 2022-08-04
Attending: PHYSICAL MEDICINE & REHABILITATION
Payer: MEDICAID

## 2022-08-04 DIAGNOSIS — M54.16 LUMBAR RADICULOPATHY: ICD-10-CM

## 2022-08-04 DIAGNOSIS — G89.29 CHRONIC BILATERAL LOW BACK PAIN WITH BILATERAL SCIATICA: ICD-10-CM

## 2022-08-04 DIAGNOSIS — M54.42 CHRONIC BILATERAL LOW BACK PAIN WITH BILATERAL SCIATICA: ICD-10-CM

## 2022-08-04 DIAGNOSIS — M54.41 CHRONIC BILATERAL LOW BACK PAIN WITH BILATERAL SCIATICA: ICD-10-CM

## 2022-08-04 DIAGNOSIS — M51.9 LUMBAR DISC DISEASE: ICD-10-CM

## 2022-08-04 DIAGNOSIS — F31.81 BIPOLAR II DISORDER (HCC): ICD-10-CM

## 2022-08-04 PROCEDURE — 97110 THERAPEUTIC EXERCISES: CPT

## 2022-08-08 ENCOUNTER — OFFICE VISIT (OUTPATIENT)
Dept: PHYSICAL THERAPY | Age: 32
End: 2022-08-08
Attending: PHYSICAL MEDICINE & REHABILITATION
Payer: MEDICAID

## 2022-08-08 ENCOUNTER — APPOINTMENT (OUTPATIENT)
Dept: PHYSICAL THERAPY | Age: 32
End: 2022-08-08
Payer: MEDICAID

## 2022-08-08 DIAGNOSIS — M51.9 LUMBAR DISC DISEASE: ICD-10-CM

## 2022-08-08 DIAGNOSIS — G89.29 CHRONIC BILATERAL LOW BACK PAIN WITH BILATERAL SCIATICA: ICD-10-CM

## 2022-08-08 DIAGNOSIS — F31.81 BIPOLAR II DISORDER (HCC): ICD-10-CM

## 2022-08-08 DIAGNOSIS — M54.41 CHRONIC BILATERAL LOW BACK PAIN WITH BILATERAL SCIATICA: ICD-10-CM

## 2022-08-08 DIAGNOSIS — M54.16 LUMBAR RADICULOPATHY: ICD-10-CM

## 2022-08-08 DIAGNOSIS — M54.42 CHRONIC BILATERAL LOW BACK PAIN WITH BILATERAL SCIATICA: ICD-10-CM

## 2022-08-08 PROCEDURE — 97110 THERAPEUTIC EXERCISES: CPT

## 2022-08-08 PROCEDURE — 97140 MANUAL THERAPY 1/> REGIONS: CPT

## 2022-08-10 ENCOUNTER — APPOINTMENT (OUTPATIENT)
Dept: PHYSICAL THERAPY | Age: 32
End: 2022-08-10
Payer: MEDICAID

## 2022-08-10 ENCOUNTER — APPOINTMENT (OUTPATIENT)
Dept: PHYSICAL THERAPY | Age: 32
End: 2022-08-10
Attending: PHYSICAL MEDICINE & REHABILITATION
Payer: MEDICAID

## 2022-08-16 ENCOUNTER — OFFICE VISIT (OUTPATIENT)
Dept: SURGERY | Facility: CLINIC | Age: 32
End: 2022-08-16
Payer: MEDICAID

## 2022-08-16 ENCOUNTER — APPOINTMENT (OUTPATIENT)
Dept: PHYSICAL THERAPY | Age: 32
End: 2022-08-16
Payer: MEDICAID

## 2022-08-16 VITALS
DIASTOLIC BLOOD PRESSURE: 82 MMHG | WEIGHT: 178 LBS | SYSTOLIC BLOOD PRESSURE: 138 MMHG | HEIGHT: 67 IN | BODY MASS INDEX: 27.94 KG/M2

## 2022-08-16 DIAGNOSIS — R21 RASH AND NONSPECIFIC SKIN ERUPTION: Primary | ICD-10-CM

## 2022-08-16 DIAGNOSIS — L72.3 SEBACEOUS CYST: ICD-10-CM

## 2022-08-16 PROCEDURE — 3075F SYST BP GE 130 - 139MM HG: CPT | Performed by: OBSTETRICS & GYNECOLOGY

## 2022-08-16 PROCEDURE — 3008F BODY MASS INDEX DOCD: CPT | Performed by: OBSTETRICS & GYNECOLOGY

## 2022-08-16 PROCEDURE — 3079F DIAST BP 80-89 MM HG: CPT | Performed by: OBSTETRICS & GYNECOLOGY

## 2022-08-16 PROCEDURE — 99213 OFFICE O/P EST LOW 20 MIN: CPT | Performed by: OBSTETRICS & GYNECOLOGY

## 2022-08-17 ENCOUNTER — TELEPHONE (OUTPATIENT)
Dept: NEUROLOGY | Facility: CLINIC | Age: 32
End: 2022-08-17

## 2022-08-17 ENCOUNTER — OFFICE VISIT (OUTPATIENT)
Dept: PHYSICAL MEDICINE AND REHAB | Facility: CLINIC | Age: 32
End: 2022-08-17
Payer: MEDICAID

## 2022-08-17 VITALS — OXYGEN SATURATION: 99 % | HEART RATE: 96 BPM | BODY MASS INDEX: 27 KG/M2 | WEIGHT: 172 LBS

## 2022-08-17 DIAGNOSIS — R20.0 BILATERAL HAND NUMBNESS: ICD-10-CM

## 2022-08-17 DIAGNOSIS — M54.16 LUMBAR RADICULOPATHY: Primary | ICD-10-CM

## 2022-08-17 DIAGNOSIS — F41.9 ANXIETY: ICD-10-CM

## 2022-08-17 DIAGNOSIS — R15.2 INCONTINENCE OF FECES WITH FECAL URGENCY: ICD-10-CM

## 2022-08-17 DIAGNOSIS — K58.0 IRRITABLE BOWEL SYNDROME WITH DIARRHEA: ICD-10-CM

## 2022-08-17 DIAGNOSIS — R15.9 INCONTINENCE OF FECES WITH FECAL URGENCY: ICD-10-CM

## 2022-08-17 DIAGNOSIS — N39.41 URGE INCONTINENCE OF URINE: ICD-10-CM

## 2022-08-17 DIAGNOSIS — M51.9 LUMBAR DISC DISEASE: ICD-10-CM

## 2022-08-17 PROCEDURE — 99215 OFFICE O/P EST HI 40 MIN: CPT | Performed by: PHYSICAL MEDICINE & REHABILITATION

## 2022-08-17 NOTE — TELEPHONE ENCOUNTER
[employer] for authorization of Bilateral S1 TFESI. cpt codes [id number], [id number]. Shanika Villafana Authorization Number: [id number] valid 8/17/2022 - 10/16/2022. Will inform Nursing.

## 2022-08-27 ENCOUNTER — HOSPITAL ENCOUNTER (OUTPATIENT)
Dept: MRI IMAGING | Facility: HOSPITAL | Age: 32
Discharge: HOME | End: 2022-08-27
Attending: PHYSICAL MEDICINE & REHABILITATION
Payer: MEDICAID

## 2022-08-27 ENCOUNTER — OFFICE VISIT (OUTPATIENT)
Dept: DERMATOLOGY CLINIC | Facility: CLINIC | Age: 32
End: 2022-08-27
Payer: MEDICAID

## 2022-08-27 DIAGNOSIS — R15.2 INCONTINENCE OF FECES WITH FECAL URGENCY: ICD-10-CM

## 2022-08-27 DIAGNOSIS — L73.9 FOLLICULITIS: ICD-10-CM

## 2022-08-27 DIAGNOSIS — R15.9 INCONTINENCE OF FECES WITH FECAL URGENCY: ICD-10-CM

## 2022-08-27 DIAGNOSIS — N39.41 URGE INCONTINENCE OF URINE: ICD-10-CM

## 2022-08-27 DIAGNOSIS — M54.16 LUMBAR RADICULOPATHY: ICD-10-CM

## 2022-08-27 DIAGNOSIS — L72.9 CYST OF SKIN: Primary | ICD-10-CM

## 2022-08-27 DIAGNOSIS — B07.9 VERRUCA(E): ICD-10-CM

## 2022-08-27 PROCEDURE — 72158 MRI LUMBAR SPINE W/O & W/DYE: CPT | Performed by: PHYSICAL MEDICINE & REHABILITATION

## 2022-08-27 PROCEDURE — 17110 DESTRUCTION B9 LES UP TO 14: CPT | Performed by: PHYSICIAN ASSISTANT

## 2022-08-27 PROCEDURE — A9575 INJ GADOTERATE MEGLUMI 0.1ML: HCPCS | Performed by: PHYSICAL MEDICINE & REHABILITATION

## 2022-08-27 PROCEDURE — 99213 OFFICE O/P EST LOW 20 MIN: CPT | Performed by: PHYSICIAN ASSISTANT

## 2022-08-27 RX ORDER — VALACYCLOVIR HYDROCHLORIDE 1 G/1
TABLET, FILM COATED ORAL
COMMUNITY
Start: 2022-07-26

## 2022-08-27 RX ORDER — CLINDAMYCIN PHOSPHATE 10 UG/ML
1 LOTION TOPICAL DAILY
Qty: 60 ML | Refills: 2 | Status: SHIPPED | OUTPATIENT
Start: 2022-08-27

## 2022-08-29 ENCOUNTER — OFFICE VISIT (OUTPATIENT)
Dept: PHYSICAL THERAPY | Age: 32
End: 2022-08-29
Attending: PHYSICAL MEDICINE & REHABILITATION
Payer: MEDICAID

## 2022-08-29 PROCEDURE — 97110 THERAPEUTIC EXERCISES: CPT

## 2022-08-29 PROCEDURE — 97140 MANUAL THERAPY 1/> REGIONS: CPT

## 2022-08-31 ENCOUNTER — TELEPHONE (OUTPATIENT)
Dept: DERMATOLOGY CLINIC | Facility: CLINIC | Age: 32
End: 2022-08-31

## 2022-08-31 ENCOUNTER — OFFICE VISIT (OUTPATIENT)
Dept: PHYSICAL THERAPY | Age: 32
End: 2022-08-31
Attending: PHYSICAL MEDICINE & REHABILITATION
Payer: MEDICAID

## 2022-08-31 PROCEDURE — 97110 THERAPEUTIC EXERCISES: CPT

## 2022-08-31 NOTE — TELEPHONE ENCOUNTER
Patient knows she may have to pay out of pocket for this. Please let her know to do so if she hasn't picked it up already. Can offer Good [facility] as well.

## 2022-09-02 DIAGNOSIS — F90.9 ATTENTION DEFICIT HYPERACTIVITY DISORDER (ADHD), UNSPECIFIED ADHD TYPE: ICD-10-CM

## 2022-09-04 RX ORDER — ALBUTEROL SULFATE 90 UG/1
2 AEROSOL, METERED RESPIRATORY (INHALATION) EVERY 6 HOURS PRN
Qty: 3 EACH | Refills: 1 | Status: SHIPPED | OUTPATIENT
Start: 2022-09-04

## 2022-09-05 RX ORDER — DEXTROAMPHETAMINE SACCHARATE, AMPHETAMINE ASPARTATE MONOHYDRATE, DEXTROAMPHETAMINE SULFATE AND AMPHETAMINE SULFATE 2.5; 2.5; 2.5; 2.5 MG/1; MG/1; MG/1; MG/1
10 CAPSULE, EXTENDED RELEASE ORAL EVERY MORNING
Qty: 30 CAPSULE | Refills: 0 | Status: SHIPPED | OUTPATIENT
Start: 2022-09-05

## 2022-09-05 NOTE — TELEPHONE ENCOUNTER
Refill passed per [facility] protocol. Requested Prescriptions   Pending Prescriptions Disp Refills    albuterol 108 (90 Base) MCG/ACT Inhalation Aero Soln 18 g 0     Sig: Inhale 2 puffs into the lungs every 6 (six) hours as needed for Wheezing or Shortness of Breath.         Asthma & COPD Medication Protocol Passed - 9/2/2022  8:29 AM        Passed - In person appointment or virtual visit in the past 6 mos or appointment in next 3 mos       Recent Outpatient Visits              4 days ago     [address] Jared Marinelli [place]    Office Visit    6 days ago     [address] Jared Marinelli [place]    Office Visit    1 week ago Cyst of skin    [facility] D[facility]at[facility] [place], [place]    Office Visit    2 weeks ago right > left S1 radiculopathy    Aracelis Conway MD    Office Visit    2 weeks ago Rash and nonspecific skin eruption    [address], [facility] Severiano Davis MD    Office Visit     Future Appointments         Provider Department Appt Notes    In 2 days Couri, Dorean Fothergill,  [facility], [place]-Physiatry EMG BUE    In 3 days Fiordaliza Weller, [facility] Surgery Cyst of skin  *policy informed    In 2 weeks Le Arriaza MD [facility], [address] [facility], [address] 3 mo f/u    In 3 weeks Jose Villalpando PA-C [facility] Dermatology check few spots of concern     In 3 weeks Kirstin Cedeno MD [address] [place] [address], [address] [facility] Restarting xolair, rashes are returning, dog walking and training is triggering my asthma    In 1 month Jared Marinelli, [address]     In 1 month Elsusu Marinelli, [address]     In 2 months Amari Price, Mukul Valles MD [facility], [facility]-Physiatry                   amphetamine-dextroamphetamine ER 10 MG Oral Capsule SR 24 Hr 30 capsule 0     Sig: Take 1 capsule (10 mg total) by mouth every morning.         There is no refill protocol information for this order           Recent Outpatient Visits              4 days ago     [address] Lyudmila Vasquez [place]    Office Visit    6 days ago     [address] Lyudmila Vasquez [place]    Office Visit    1 week ago Cyst of skin    [facility] Rebecca Carter [place]    Office Visit    2 weeks ago right > left S1 radiculopathy    [facility], Xavier Layton MD    Office Visit    2 weeks ago Rash and nonspecific skin eruption    [address], [place] [address] Denton Montano MD    Office Visit            Future Appointments         Provider Department Appt Notes    In 2 days Mukul Em  [facility] B[facility]    In 3 days David Urbina MD [facility] Surgery Cyst of skin  *policy informed    In 2 weeks Griffin Knowles MD [facility], [address], [employer] 3 mo f/u    In 3 weeks TAINA Suarez-HARRISON [facility] Derm[facility] check few spots of concern     In 3 weeks Zenia Garay MD [facility], [facility], [facility] Restarting xolair, rashes are returning, dog walking and training is triggering my asthma    In 1 month Lyudmila Vasquez, [address]     In 1 month Lyudmila Clementeat, [address]     In 2 months [address], Mukul Valles MD [address] [place] [address]

## 2022-09-06 ENCOUNTER — APPOINTMENT (OUTPATIENT)
Dept: PHYSICAL THERAPY | Age: 32
End: 2022-09-06
Payer: MEDICAID

## 2022-09-06 ENCOUNTER — PROCEDURE VISIT (OUTPATIENT)
Dept: PHYSICAL MEDICINE AND REHAB | Facility: CLINIC | Age: 32
End: 2022-09-06
Payer: MEDICAID

## 2022-09-06 DIAGNOSIS — R20.0 BILATERAL HAND NUMBNESS: Primary | ICD-10-CM

## 2022-09-06 PROBLEM — M48.061 LUMBAR FORAMINAL STENOSIS: Status: ACTIVE | Noted: 2022-09-06

## 2022-09-06 PROCEDURE — 95886 MUSC TEST DONE W/N TEST COMP: CPT | Performed by: PHYSICAL MEDICINE & REHABILITATION

## 2022-09-06 PROCEDURE — 95912 NRV CNDJ TEST 11-12 STUDIES: CPT | Performed by: PHYSICAL MEDICINE & REHABILITATION

## 2022-09-07 ENCOUNTER — OFFICE VISIT (OUTPATIENT)
Dept: SURGERY | Facility: CLINIC | Age: 32
End: 2022-09-07
Payer: MEDICAID

## 2022-09-07 VITALS — WEIGHT: 169 LBS | HEIGHT: 67 IN | BODY MASS INDEX: 26.53 KG/M2

## 2022-09-07 DIAGNOSIS — L72.0 EPIDERMAL INCLUSION CYST: Primary | ICD-10-CM

## 2022-09-07 PROCEDURE — 3008F BODY MASS INDEX DOCD: CPT | Performed by: SURGERY

## 2022-09-07 PROCEDURE — 99244 OFF/OP CNSLTJ NEW/EST MOD 40: CPT | Performed by: SURGERY

## 2022-09-12 ENCOUNTER — PATIENT MESSAGE (OUTPATIENT)
Dept: PHYSICAL MEDICINE AND REHAB | Facility: CLINIC | Age: 32
End: 2022-09-12

## 2022-09-12 DIAGNOSIS — M50.90 CERVICAL DISC DISEASE: ICD-10-CM

## 2022-09-12 DIAGNOSIS — R20.0 BILATERAL HAND NUMBNESS: ICD-10-CM

## 2022-09-12 DIAGNOSIS — M50.20 CERVICAL HERNIATED DISC: ICD-10-CM

## 2022-09-12 DIAGNOSIS — M54.16 LUMBAR RADICULOPATHY: Primary | ICD-10-CM

## 2022-09-12 NOTE — TELEPHONE ENCOUNTER
From: Sarah Wallace  To: Jody Perry MD  Sent: 9/12/2022 8:34 AM CDT  Subject: REFERRAL REQUEST for Possible Renal Cyst    Good Morning! Can I have a referral for the possible renal cyst that showed up on an X-ray and most recent Lumbar MRI? I have been experiencing mild to moderate pain in the kidney area for years (with one trip to the ER for it) and would like to have it reviewed. And what do we think of the other cysts found? Thank you again for all your help!

## 2022-09-12 NOTE — TELEPHONE ENCOUNTER
From: Warren Marcos  To: Doris Sicard A. Wynn Alpha, MD  Sent: 9/12/2022 8:29 AM CDT  Subject: CERVICAL SPINE MRI Review Request    Good Morning Dr. Tien Huffman,    Just a reminder per your request to review my old cervical spine MRI from 2019/2020 to see if there is anything there that could be causing the painful numbness and tingling in both hands and arms. Did you want to order a new MRI? You had also mentioned that as a possibility when we met for EMG/NCS last week. Thank you so much for your help!

## 2022-09-12 NOTE — TELEPHONE ENCOUNTER
Per lumbar MRI report: \"Incidental S2 sacral Tarlov cysts. \" \"Probable small 1.5 cm left interpolar renal cyst. \"    Patient asking about cysts that were found on most recent MRI and is requesting a referral for the possible renal cyst. Stated she has had mild/moderate pain in the kidney area for years. Message forwarded to Marielos Pollard to advise.

## 2022-09-12 NOTE — TELEPHONE ENCOUNTER
LOV: 8/17/22  NOV: 11/23/22    Plan per Familia Lee at [address] [place] [address]: \"She will get a new MRI of the lumbar spine due to the urinary and bowel incontinence that she developed after the last injections. If the MRI scan does not show any significant pathology, then I will perform bilateral S1 TFESI(s) again. She will resume her pelvic floor home exercises. The patient will continue with PT. The patient will continue with her home exercise program.  I will do an EMG/NCS of the bilateral arms and hands due to the hand numbness and pain. She will follow up in 2-3 months. \"    Patient had an EMG on 9/6/22. Patient completed lumbar MRI on 8/27/22. Patient requesting Familia Lee review old cervical spine MRI that was completed on 2/18/2020 to see if there is anything on the MRI that would be causing her painful numbness/tingling in both hands and arms. Patient wondering if a new cervical spine MRI is needed. Update forwarded to Familia Lee to advise.

## 2022-09-13 ENCOUNTER — TELEPHONE (OUTPATIENT)
Dept: NEUROLOGY | Facility: CLINIC | Age: 32
End: 2022-09-13

## 2022-09-13 DIAGNOSIS — M54.41 CHRONIC BILATERAL LOW BACK PAIN WITH BILATERAL SCIATICA: ICD-10-CM

## 2022-09-13 DIAGNOSIS — G89.29 CHRONIC BILATERAL LOW BACK PAIN WITH BILATERAL SCIATICA: ICD-10-CM

## 2022-09-13 DIAGNOSIS — M54.42 CHRONIC BILATERAL LOW BACK PAIN WITH BILATERAL SCIATICA: ICD-10-CM

## 2022-09-13 DIAGNOSIS — M48.061 LUMBAR FORAMINAL STENOSIS: Primary | ICD-10-CM

## 2022-09-13 NOTE — TELEPHONE ENCOUNTER
She can see Dr. Kavon Silverio who is a nephrologist.  The Tarlov cysts are not a concern. The EMG test was normal.  I will need to re-evaluate her hands.

## 2022-09-13 NOTE — TELEPHONE ENCOUNTER
[employer]  Online for authorization of Bilateral S1 TFESI cpt codes [id number], [id number]. Authorization Number: [id number] valid 9/13/2022 - 11/12/2022. Will  inform Nursing.

## 2022-09-14 NOTE — TELEPHONE ENCOUNTER
Spoke with patient and she is having a L groin cyst removed on 11/01/22. Dr. Wilma Jones next available appt at [facility] is 11/11/22. Patient states they gave her a 1 week recovery time after procedure. Patient would like to be sedated or would like to take some valium before procedure. Let the patient know I will ask Dr. Kilo Michelle and get back to her.

## 2022-09-16 ENCOUNTER — TELEPHONE (OUTPATIENT)
Dept: DERMATOLOGY CLINIC | Facility: CLINIC | Age: 32
End: 2022-09-16

## 2022-09-16 NOTE — TELEPHONE ENCOUNTER
PA already addressed, will not proceed r/t coverage. Pt aware to have to pay out of pocket. Appt 9/26 for further discussion if needed.

## 2022-09-19 NOTE — TELEPHONE ENCOUNTER
Message sent to patient via [employer]. Injection placed on hold for [address] [place] [address] on 11/18/22. Waiting for patient response for IV sedation or Valium.

## 2022-09-20 ENCOUNTER — APPOINTMENT (OUTPATIENT)
Dept: CT IMAGING | Facility: HOSPITAL | Age: 32
End: 2022-09-20
Attending: EMERGENCY MEDICINE

## 2022-09-20 ENCOUNTER — HOSPITAL ENCOUNTER (EMERGENCY)
Facility: HOSPITAL | Age: 32
Discharge: HOME | End: 2022-09-21
Attending: EMERGENCY MEDICINE

## 2022-09-20 VITALS
HEIGHT: 67 IN | RESPIRATION RATE: 18 BRPM | HEART RATE: 79 BPM | SYSTOLIC BLOOD PRESSURE: 133 MMHG | BODY MASS INDEX: 26.53 KG/M2 | OXYGEN SATURATION: 100 % | DIASTOLIC BLOOD PRESSURE: 83 MMHG | WEIGHT: 169 LBS | TEMPERATURE: 98 F

## 2022-09-20 DIAGNOSIS — R31.9 HEMATURIA, UNSPECIFIED TYPE: Primary | ICD-10-CM

## 2022-09-20 LAB
ANION GAP SERPL CALC-SCNC: 5 MMOL/L (ref 0–18)
B-HCG UR QL: NEGATIVE
BASOPHILS # BLD AUTO: 0.05 X10(3) UL (ref 0–0.2)
BASOPHILS NFR BLD AUTO: 0.4 %
BILIRUB UR QL: NEGATIVE
BUN BLD-MCNC: 14 MG/DL (ref 7–18)
BUN/CREAT SERPL: 15.4 (ref 10–20)
CALCIUM BLD-MCNC: 9.3 MG/DL (ref 8.5–10.1)
CHLORIDE SERPL-SCNC: 103 MMOL/L (ref 98–112)
CLARITY UR: CLEAR
CO2 SERPL-SCNC: 28 MMOL/L (ref 21–32)
COLOR UR: YELLOW
CREAT BLD-MCNC: 0.91 MG/DL
DEPRECATED RDW RBC AUTO: 43.8 FL (ref 35.1–46.3)
EOSINOPHIL # BLD AUTO: 0.25 X10(3) UL (ref 0–0.7)
EOSINOPHIL NFR BLD AUTO: 2.1 %
ERYTHROCYTE [DISTWIDTH] IN BLOOD BY AUTOMATED COUNT: 13.5 % (ref 11–15)
GFR SERPLBLD BASED ON 1.73 SQ M-ARVRAT: 85 ML/MIN/1.73M2 (ref 60–?)
GLUCOSE BLD-MCNC: 106 MG/DL (ref 70–99)
GLUCOSE UR-MCNC: NEGATIVE MG/DL
HCT VFR BLD AUTO: 44 %
HGB BLD-MCNC: 14.6 G/DL
IMM GRANULOCYTES # BLD AUTO: 0.03 X10(3) UL (ref 0–1)
IMM GRANULOCYTES NFR BLD: 0.2 %
KETONES UR-MCNC: NEGATIVE MG/DL
LEUKOCYTE ESTERASE UR QL STRIP.AUTO: NEGATIVE
LYMPHOCYTES # BLD AUTO: 3.34 X10(3) UL (ref 1–4)
LYMPHOCYTES NFR BLD AUTO: 27.8 %
MCH RBC QN AUTO: 29.5 PG (ref 26–34)
MCHC RBC AUTO-ENTMCNC: 33.2 G/DL (ref 31–37)
MCV RBC AUTO: 88.9 FL
MONOCYTES # BLD AUTO: 0.67 X10(3) UL (ref 0.1–1)
MONOCYTES NFR BLD AUTO: 5.6 %
NEUTROPHILS # BLD AUTO: 7.68 X10 (3) UL (ref 1.5–7.7)
NEUTROPHILS # BLD AUTO: 7.68 X10(3) UL (ref 1.5–7.7)
NEUTROPHILS NFR BLD AUTO: 63.9 %
NITRITE UR QL STRIP.AUTO: NEGATIVE
OSMOLALITY SERPL CALC.SUM OF ELEC: 283 MOSM/KG (ref 275–295)
PH UR: 6 [PH] (ref 5–8)
PLATELET # BLD AUTO: 453 10(3)UL (ref 150–450)
POTASSIUM SERPL-SCNC: 4.1 MMOL/L (ref 3.5–5.1)
PROT UR-MCNC: NEGATIVE MG/DL
RBC # BLD AUTO: 4.95 X10(6)UL
SODIUM SERPL-SCNC: 136 MMOL/L (ref 136–145)
SP GR UR STRIP: 1.01 (ref 1–1.03)
UROBILINOGEN UR STRIP-ACNC: 0.2
WBC # BLD AUTO: 12 X10(3) UL (ref 4–11)

## 2022-09-20 PROCEDURE — 99284 EMERGENCY DEPT VISIT MOD MDM: CPT

## 2022-09-20 PROCEDURE — 80048 BASIC METABOLIC PNL TOTAL CA: CPT

## 2022-09-20 PROCEDURE — 80048 BASIC METABOLIC PNL TOTAL CA: CPT | Performed by: EMERGENCY MEDICINE

## 2022-09-20 PROCEDURE — 81001 URINALYSIS AUTO W/SCOPE: CPT

## 2022-09-20 PROCEDURE — 85025 COMPLETE CBC W/AUTO DIFF WBC: CPT

## 2022-09-20 PROCEDURE — 81025 URINE PREGNANCY TEST: CPT

## 2022-09-20 PROCEDURE — 81001 URINALYSIS AUTO W/SCOPE: CPT | Performed by: EMERGENCY MEDICINE

## 2022-09-20 PROCEDURE — 36415 COLL VENOUS BLD VENIPUNCTURE: CPT

## 2022-09-20 PROCEDURE — 74176 CT ABD & PELVIS W/O CONTRAST: CPT | Performed by: EMERGENCY MEDICINE

## 2022-09-20 PROCEDURE — 85025 COMPLETE CBC W/AUTO DIFF WBC: CPT | Performed by: EMERGENCY MEDICINE

## 2022-09-20 PROCEDURE — 81015 MICROSCOPIC EXAM OF URINE: CPT

## 2022-09-20 NOTE — TELEPHONE ENCOUNTER
*Order updated to reflect MAC sedation since IVCS is unable to be done at [facility]. Patient has been scheduled for Bilateral S1 TFESI on 11/18/22 at the [facility] with [address]. -Anesthesia type: MAC.  -If scheduling [facility] covid testing required for all procedures whether patient is vaccinated or not. -Patient informed not to eat or drink anything after midnight the night prior to the procedure, if being sedated. -Patient was advised that if he/she does receive the covid vaccine it needs to be at least 2 weeks before or after the injection. -Medications and allergies reviewed. -Patient reminded to hold NSAIDs (Ibuprofen, ASA 81, Aleve, Naproxen, Mobic, Diclofenac, Etodolac, Celebrex etc.) for 3 days prior to [facility]  if BMI is greater than 35. For Cervical injections only hold multivitamins, Vitamin E, Fish Oil, Phentermine (Lomaira) for 7 days prior to injection and NSAIDS.  mg to be held for 7 days prior to injections.  -If patient is receiving MAC/IVCS Phentermine Daralene Ball) will need to be held for 7 days prior to injection.  -If on blood thinner clearance has been received to hold this medication by provider.   -Patient informed he/she will need a  to and from procedure. -[facility] is located in the [facility] 1st floor. Patient may park in the yellow parking. Patient verbalized understanding and agrees with plan.  -----> Scheduled in Epic: Yes  -----> Scheduled in [employer]:  Yes

## 2022-09-20 NOTE — TELEPHONE ENCOUNTER
Called [employer] to request extension for  authorization of Bilateral S1 TFESI cpt codes [id number], 11528. Authorization Number: [id number] valid 9/13/2022 - 11/12/2022. S/w Meghann HAMLINJeremiah Cannot extend end date. Reference # [id number]         [employer] for authorization of Bilateral S1 TFESI cpt codes [id number]. Authorization [id number]  valid 9/20/2022 - 11/19/2022    Scheduled on 11/18/22. Will  inform Nursing.

## 2022-09-21 NOTE — ED INITIAL ASSESSMENT (HPI)
Patient reporting n/v, chills, hematuria and \"tissue in toilet\" after urinating. +left flank pain.
: Yes

## 2022-09-22 ENCOUNTER — PATIENT MESSAGE (OUTPATIENT)
Dept: SURGERY | Facility: CLINIC | Age: 32
End: 2022-09-22

## 2022-09-22 DIAGNOSIS — L72.3 SEBACEOUS CYST: Primary | ICD-10-CM

## 2022-09-23 NOTE — TELEPHONE ENCOUNTER
From: Edgardo Fuentes  To: Jovany Hankins MD  Sent: 9/22/2022 8:48 PM CDT  Subject: Partial cyst pop    Hi Dr. Mercy Ceja,    My cyst partially popped today. Much drainage. Please advise.

## 2022-09-25 ENCOUNTER — IMMUNIZATION (OUTPATIENT)
Dept: LAB | Age: 32
End: 2022-09-25
Attending: EMERGENCY MEDICINE
Payer: MEDICAID

## 2022-09-25 ENCOUNTER — HOSPITAL ENCOUNTER (EMERGENCY)
Facility: HOSPITAL | Age: 32
Discharge: HOME | End: 2022-09-25
Attending: EMERGENCY MEDICINE
Payer: MEDICAID

## 2022-09-25 ENCOUNTER — APPOINTMENT (OUTPATIENT)
Dept: CT IMAGING | Facility: HOSPITAL | Age: 32
End: 2022-09-25
Attending: EMERGENCY MEDICINE
Payer: MEDICAID

## 2022-09-25 ENCOUNTER — APPOINTMENT (OUTPATIENT)
Dept: ULTRASOUND IMAGING | Facility: HOSPITAL | Age: 32
End: 2022-09-25
Attending: EMERGENCY MEDICINE
Payer: MEDICAID

## 2022-09-25 VITALS
SYSTOLIC BLOOD PRESSURE: 132 MMHG | TEMPERATURE: 98 F | HEART RATE: 74 BPM | WEIGHT: 164 LBS | OXYGEN SATURATION: 99 % | DIASTOLIC BLOOD PRESSURE: 76 MMHG | RESPIRATION RATE: 18 BRPM | BODY MASS INDEX: 26 KG/M2

## 2022-09-25 DIAGNOSIS — Z23 NEED FOR VACCINATION: Primary | ICD-10-CM

## 2022-09-25 DIAGNOSIS — R10.32 ABDOMINAL PAIN, LEFT LOWER QUADRANT: Primary | ICD-10-CM

## 2022-09-25 LAB
ANION GAP SERPL CALC-SCNC: 6 MMOL/L (ref 0–18)
B-HCG UR QL: NEGATIVE
BASOPHILS # BLD AUTO: 0.05 X10(3) UL (ref 0–0.2)
BASOPHILS NFR BLD AUTO: 0.3 %
BILIRUB UR QL CFM: NEGATIVE
BUN BLD-MCNC: 8 MG/DL (ref 7–18)
BUN/CREAT SERPL: 9.5 (ref 10–20)
CALCIUM BLD-MCNC: 8.7 MG/DL (ref 8.5–10.1)
CHLORIDE SERPL-SCNC: 105 MMOL/L (ref 98–112)
CLARITY UR: CLEAR
CO2 SERPL-SCNC: 28 MMOL/L (ref 21–32)
COLOR UR: YELLOW
CREAT BLD-MCNC: 0.84 MG/DL
DEPRECATED RDW RBC AUTO: 45.3 FL (ref 35.1–46.3)
EOSINOPHIL # BLD AUTO: 0.19 X10(3) UL (ref 0–0.7)
EOSINOPHIL NFR BLD AUTO: 1.3 %
ERYTHROCYTE [DISTWIDTH] IN BLOOD BY AUTOMATED COUNT: 13.3 % (ref 11–15)
GFR SERPLBLD BASED ON 1.73 SQ M-ARVRAT: 93 ML/MIN/1.73M2 (ref 60–?)
GLUCOSE BLD-MCNC: 80 MG/DL (ref 70–99)
GLUCOSE UR-MCNC: NEGATIVE MG/DL
HCT VFR BLD AUTO: 41.9 %
HGB BLD-MCNC: 13.3 G/DL
IMM GRANULOCYTES # BLD AUTO: 0.05 X10(3) UL (ref 0–1)
IMM GRANULOCYTES NFR BLD: 0.3 %
LYMPHOCYTES # BLD AUTO: 3.1 X10(3) UL (ref 1–4)
LYMPHOCYTES NFR BLD AUTO: 21.1 %
MCH RBC QN AUTO: 29.2 PG (ref 26–34)
MCHC RBC AUTO-ENTMCNC: 31.7 G/DL (ref 31–37)
MCV RBC AUTO: 91.9 FL
MONOCYTES # BLD AUTO: 0.88 X10(3) UL (ref 0.1–1)
MONOCYTES NFR BLD AUTO: 6 %
NEUTROPHILS # BLD AUTO: 10.44 X10 (3) UL (ref 1.5–7.7)
NEUTROPHILS # BLD AUTO: 10.44 X10(3) UL (ref 1.5–7.7)
NEUTROPHILS NFR BLD AUTO: 71 %
NITRITE UR QL STRIP.AUTO: NEGATIVE
OSMOLALITY SERPL CALC.SUM OF ELEC: 285 MOSM/KG (ref 275–295)
PH UR: 5.5 [PH] (ref 5–8)
PLATELET # BLD AUTO: 425 10(3)UL (ref 150–450)
POTASSIUM SERPL-SCNC: 3.5 MMOL/L (ref 3.5–5.1)
PROT UR-MCNC: NEGATIVE MG/DL
RBC # BLD AUTO: 4.56 X10(6)UL
SODIUM SERPL-SCNC: 139 MMOL/L (ref 136–145)
SP GR UR STRIP: 1.02 (ref 1–1.03)
UROBILINOGEN UR STRIP-ACNC: 0.2
WBC # BLD AUTO: 14.7 X10(3) UL (ref 4–11)
YEAST UR QL: PRESENT /HPF
YEAST UR QL: PRESENT /HPF

## 2022-09-25 PROCEDURE — 87086 URINE CULTURE/COLONY COUNT: CPT | Performed by: EMERGENCY MEDICINE

## 2022-09-25 PROCEDURE — 76830 TRANSVAGINAL US NON-OB: CPT | Performed by: EMERGENCY MEDICINE

## 2022-09-25 PROCEDURE — 96375 TX/PRO/DX INJ NEW DRUG ADDON: CPT

## 2022-09-25 PROCEDURE — 81025 URINE PREGNANCY TEST: CPT

## 2022-09-25 PROCEDURE — 81015 MICROSCOPIC EXAM OF URINE: CPT | Performed by: EMERGENCY MEDICINE

## 2022-09-25 PROCEDURE — 80048 BASIC METABOLIC PNL TOTAL CA: CPT | Performed by: EMERGENCY MEDICINE

## 2022-09-25 PROCEDURE — 76856 US EXAM PELVIC COMPLETE: CPT | Performed by: EMERGENCY MEDICINE

## 2022-09-25 PROCEDURE — 99284 EMERGENCY DEPT VISIT MOD MDM: CPT

## 2022-09-25 PROCEDURE — 93975 VASCULAR STUDY: CPT | Performed by: EMERGENCY MEDICINE

## 2022-09-25 PROCEDURE — 81001 URINALYSIS AUTO W/SCOPE: CPT | Performed by: EMERGENCY MEDICINE

## 2022-09-25 PROCEDURE — 0134A SARSCOV2 VAC BVL 50MCG/0.5ML: CPT

## 2022-09-25 PROCEDURE — 96374 THER/PROPH/DIAG INJ IV PUSH: CPT

## 2022-09-25 PROCEDURE — 87077 CULTURE AEROBIC IDENTIFY: CPT | Performed by: EMERGENCY MEDICINE

## 2022-09-25 PROCEDURE — 85025 COMPLETE CBC W/AUTO DIFF WBC: CPT | Performed by: EMERGENCY MEDICINE

## 2022-09-25 PROCEDURE — 74176 CT ABD & PELVIS W/O CONTRAST: CPT | Performed by: EMERGENCY MEDICINE

## 2022-09-25 RX ORDER — ONDANSETRON 2 MG/ML
4 INJECTION INTRAMUSCULAR; INTRAVENOUS ONCE
Status: COMPLETED | OUTPATIENT
Start: 2022-09-25 | End: 2022-09-25

## 2022-09-25 RX ORDER — ONDANSETRON 4 MG/1
4 TABLET, ORALLY DISINTEGRATING ORAL EVERY 4 HOURS PRN
Qty: 10 TABLET | Refills: 0 | Status: SHIPPED | OUTPATIENT
Start: 2022-09-25 | End: 2022-10-05

## 2022-09-25 RX ORDER — CEPHALEXIN 500 MG/1
500 CAPSULE ORAL 2 TIMES DAILY
Qty: 10 CAPSULE | Refills: 0 | Status: SHIPPED | OUTPATIENT
Start: 2022-09-25 | End: 2022-10-28 | Stop reason: ALTCHOICE

## 2022-09-25 RX ORDER — KETOROLAC TROMETHAMINE 15 MG/ML
15 INJECTION, SOLUTION INTRAMUSCULAR; INTRAVENOUS ONCE
Status: COMPLETED | OUTPATIENT
Start: 2022-09-25 | End: 2022-09-25

## 2022-09-26 ENCOUNTER — OFFICE VISIT (OUTPATIENT)
Dept: ALLERGY | Facility: CLINIC | Age: 32
End: 2022-09-26

## 2022-09-26 VITALS
OXYGEN SATURATION: 99 % | HEIGHT: 67 IN | SYSTOLIC BLOOD PRESSURE: 147 MMHG | WEIGHT: 164.81 LBS | DIASTOLIC BLOOD PRESSURE: 91 MMHG | BODY MASS INDEX: 25.87 KG/M2 | HEART RATE: 92 BPM

## 2022-09-26 DIAGNOSIS — Z23 FLU VACCINE NEED: ICD-10-CM

## 2022-09-26 DIAGNOSIS — L50.1 CHRONIC IDIOPATHIC URTICARIA: Primary | ICD-10-CM

## 2022-09-26 DIAGNOSIS — J30.1 SEASONAL ALLERGIC RHINITIS DUE TO POLLEN: ICD-10-CM

## 2022-09-26 DIAGNOSIS — Z92.29 COVID-19 VACCINE SERIES COMPLETED: ICD-10-CM

## 2022-09-26 DIAGNOSIS — J45.909 EXTRINSIC ASTHMA WITHOUT COMPLICATION, UNSPECIFIED ASTHMA SEVERITY, UNSPECIFIED WHETHER PERSISTENT: ICD-10-CM

## 2022-09-26 RX ORDER — FLUCONAZOLE 200 MG/1
200 TABLET ORAL DAILY
Qty: 7 TABLET | Refills: 0 | Status: SHIPPED | OUTPATIENT
Start: 2022-09-26 | End: 2022-10-03

## 2022-09-26 NOTE — PATIENT INSTRUCTIONS
#1 chronic idiopathic urticaria  Increasing hives since April 2022 in spite of Xyzal once a day. May increase Xyzal up to 2-4 times per day. Patient to start with twice a day. Patient with previous response to Xolair in the past.  Patient wishes to reexplore the Xolair option. Application for Xolair provided. Reviewed may take up to 4 weeks from approval  Reviewed potential side effects with Xolair. #2 asthma  Continue with Symbicort 2 puffs twice a day with albuterol as needed. No ED visits or prednisone in the interim. Denies symptoms more than 2 days/week.     #3 allergic rhinitis  Continue with Xyzal.  May add Flonase 2 sprays per nostril once a day if having problem nasal congestion postnasal drip  Suspect more nonallergic rhinitis component given her negative serum IgE testing in the past    #4 COVID vaccinations up-to-date x 4 doses     #5 recommend flu vaccine this fall    #6 pneumonia vaccine up-to-date

## 2022-09-26 NOTE — ED INITIAL ASSESSMENT (HPI)
Pt reports being here earlier this week for left sided flank pain radiating into abd. Still having pain. Want further workup.

## 2022-09-27 ENCOUNTER — TELEPHONE (OUTPATIENT)
Dept: ALLERGY | Facility: CLINIC | Age: 32
End: 2022-09-27

## 2022-09-27 NOTE — TELEPHONE ENCOUNTER
Patient completed [employer] Enrollment Form and Consent patient sections at 9/26/2022 Allergy Physician Visit. 9/27/2022, RN completed clinical portion of form. Dr. Eliane Cruz reviewed form and completed Rx section, signed form. Completed form with copy of insurance card faxed to [address] [place] [address] at [phone]. Fax confirmation received noting successful transmission of fax. Await response from [address] [place] [address] as far as benefit investigation.

## 2022-09-28 ENCOUNTER — TELEPHONE (OUTPATIENT)
Dept: NEPHROLOGY | Facility: CLINIC | Age: 32
End: 2022-09-28

## 2022-09-28 NOTE — TELEPHONE ENCOUNTER
Patient missed her consult with Dr. Hesham Richey today and had to reschedule due to late arrival. Patient was lost. She is rescheduled for 10/17/22 first available with a waitlist and is requesting a sooner appointment. Please follow-up with patient. Thank you.

## 2022-09-30 ENCOUNTER — PATIENT MESSAGE (OUTPATIENT)
Dept: SURGERY | Facility: CLINIC | Age: 32
End: 2022-09-30

## 2022-09-30 DIAGNOSIS — R10.9 ABDOMINAL DISCOMFORT: Primary | ICD-10-CM

## 2022-09-30 DIAGNOSIS — B37.9 YEAST INFECTION: Primary | ICD-10-CM

## 2022-09-30 NOTE — TELEPHONE ENCOUNTER
From: Mahnaz Herrera  To: Arcelia Wallace MD  Sent: 9/30/2022 7:21 AM CDT  Subject: Er visits and yeast infection    I passed kidney stones about a week ago, your earliest appointment was Oct 17. I am still in a lot of pain from the kidney stones. I am still passing blood about 25% of the time and about to finish my antibiotic tomorrow, and I have a yeast infection that was detected in the ER AND that is causing burning and discomfort and was not prescribed anything for that. Nephrologist cannot see me until Oct 11. I copied this from a message that was sent today to doctor alen, as well. Please let me know if you can call in scripts for the yeast infection and or see me sooner or do a video call. Thank you so much.

## 2022-10-03 ENCOUNTER — OFFICE VISIT (OUTPATIENT)
Dept: FAMILY MEDICINE CLINIC | Facility: CLINIC | Age: 32
End: 2022-10-03
Payer: MEDICAID

## 2022-10-03 VITALS
HEIGHT: 67 IN | WEIGHT: 166 LBS | DIASTOLIC BLOOD PRESSURE: 72 MMHG | OXYGEN SATURATION: 98 % | SYSTOLIC BLOOD PRESSURE: 132 MMHG | BODY MASS INDEX: 26.06 KG/M2 | HEART RATE: 79 BPM

## 2022-10-03 DIAGNOSIS — R10.9 ACUTE LEFT FLANK PAIN: ICD-10-CM

## 2022-10-03 DIAGNOSIS — R30.0 DYSURIA: Primary | ICD-10-CM

## 2022-10-03 PROCEDURE — 3008F BODY MASS INDEX DOCD: CPT | Performed by: FAMILY MEDICINE

## 2022-10-03 PROCEDURE — 3078F DIAST BP <80 MM HG: CPT | Performed by: FAMILY MEDICINE

## 2022-10-03 PROCEDURE — 87086 URINE CULTURE/COLONY COUNT: CPT | Performed by: FAMILY MEDICINE

## 2022-10-03 PROCEDURE — 99215 OFFICE O/P EST HI 40 MIN: CPT | Performed by: FAMILY MEDICINE

## 2022-10-03 PROCEDURE — 3075F SYST BP GE 130 - 139MM HG: CPT | Performed by: FAMILY MEDICINE

## 2022-10-03 RX ORDER — OMEPRAZOLE 40 MG/1
40 CAPSULE, DELAYED RELEASE ORAL DAILY
Qty: 90 CAPSULE | Refills: 0 | Status: SHIPPED | OUTPATIENT
Start: 2022-10-03

## 2022-10-03 RX ORDER — MELOXICAM 7.5 MG/1
7.5 TABLET ORAL DAILY
Qty: 30 TABLET | Refills: 0 | Status: SHIPPED | OUTPATIENT
Start: 2022-10-03

## 2022-10-03 RX ORDER — NITROFURANTOIN 25; 75 MG/1; MG/1
100 CAPSULE ORAL 2 TIMES DAILY
Qty: 14 CAPSULE | Refills: 0 | Status: SHIPPED | OUTPATIENT
Start: 2022-10-03 | End: 2022-10-10

## 2022-10-04 ENCOUNTER — TELEPHONE (OUTPATIENT)
Dept: FAMILY MEDICINE CLINIC | Facility: CLINIC | Age: 32
End: 2022-10-04

## 2022-10-04 ENCOUNTER — OFFICE VISIT (OUTPATIENT)
Dept: OBGYN CLINIC | Facility: CLINIC | Age: 32
End: 2022-10-04
Payer: MEDICAID

## 2022-10-04 ENCOUNTER — PATIENT MESSAGE (OUTPATIENT)
Dept: FAMILY MEDICINE CLINIC | Facility: CLINIC | Age: 32
End: 2022-10-04

## 2022-10-04 VITALS
DIASTOLIC BLOOD PRESSURE: 72 MMHG | BODY MASS INDEX: 26 KG/M2 | WEIGHT: 165.63 LBS | SYSTOLIC BLOOD PRESSURE: 120 MMHG | HEIGHT: 67 IN

## 2022-10-04 DIAGNOSIS — F90.9 ATTENTION DEFICIT HYPERACTIVITY DISORDER (ADHD), UNSPECIFIED ADHD TYPE: ICD-10-CM

## 2022-10-04 DIAGNOSIS — N89.8 VAGINAL DISCHARGE: Primary | ICD-10-CM

## 2022-10-04 PROCEDURE — 87491 CHLMYD TRACH DNA AMP PROBE: CPT | Performed by: OBSTETRICS & GYNECOLOGY

## 2022-10-04 PROCEDURE — 87205 SMEAR GRAM STAIN: CPT | Performed by: OBSTETRICS & GYNECOLOGY

## 2022-10-04 PROCEDURE — 3074F SYST BP LT 130 MM HG: CPT | Performed by: OBSTETRICS & GYNECOLOGY

## 2022-10-04 PROCEDURE — 99213 OFFICE O/P EST LOW 20 MIN: CPT | Performed by: OBSTETRICS & GYNECOLOGY

## 2022-10-04 PROCEDURE — 87591 N.GONORRHOEAE DNA AMP PROB: CPT | Performed by: OBSTETRICS & GYNECOLOGY

## 2022-10-04 PROCEDURE — 87808 TRICHOMONAS ASSAY W/OPTIC: CPT | Performed by: OBSTETRICS & GYNECOLOGY

## 2022-10-04 PROCEDURE — 3008F BODY MASS INDEX DOCD: CPT | Performed by: OBSTETRICS & GYNECOLOGY

## 2022-10-04 PROCEDURE — 3078F DIAST BP <80 MM HG: CPT | Performed by: OBSTETRICS & GYNECOLOGY

## 2022-10-04 PROCEDURE — 87106 FUNGI IDENTIFICATION YEAST: CPT | Performed by: OBSTETRICS & GYNECOLOGY

## 2022-10-04 RX ORDER — DEXTROAMPHETAMINE SACCHARATE, AMPHETAMINE ASPARTATE MONOHYDRATE, DEXTROAMPHETAMINE SULFATE AND AMPHETAMINE SULFATE 2.5; 2.5; 2.5; 2.5 MG/1; MG/1; MG/1; MG/1
10 CAPSULE, EXTENDED RELEASE ORAL EVERY MORNING
Qty: 30 CAPSULE | Refills: 0 | Status: SHIPPED | OUTPATIENT
Start: 2022-10-04

## 2022-10-04 NOTE — TELEPHONE ENCOUNTER
Hammad Erickson RN 10/4/2022 10:49 AM CDT        ----- Message -----  From: Param Rivas  Sent: 10/4/2022 9:02 AM CDT  To: Em Rn Triage  Subject: Vomiting and nausea     Took one meloxicam as directed with omerprazole around 5 am this morning. I threw up about an hour and a half later. No relief in inflammatory pain symptoms. Still hurts to sit, to pee. Slight change in feeling a little less bloated from two doses of antibiotic. Please advise. Thank you.

## 2022-10-04 NOTE — TELEPHONE ENCOUNTER
Please review. Protocol Failed or has No Protocol. Requested Prescriptions   Pending Prescriptions Disp Refills    amphetamine-dextroamphetamine ER 10 MG Oral Capsule SR 24 Hr 30 capsule 0     Sig: Take 1 capsule (10 mg total) by mouth every morning.         There is no refill protocol information for this order           Future Appointments         Provider Department Appt Notes    In 1 week Marisela Zambrano MD [employer], [facility], [address] [place] [address] post renal cyst pain    In 1 week Dre Palacio, [address] [facility], [address], [place] Kidney stones    In 2 weeks [facility]id Townsend, [facility]     In 1 month Blaire Mccoy MD [employer], [employer], [address], [place] Abdominal discomfort    In 1 month Vee Em MD [address] [place] [address] Group Bilateral S1 TFESI MAC    In 1 month Vee Em MD [address]-Physiatry             Recent Outpatient Visits              Today Vaginal discharge    [address], [address], MD    Office Visit    Yesterday Dysuria    [address] [facility], [place] Dre Palacio DO    Office Visit    1 week ago Chronic idiopathic urticaria    [address], Wayne De La O MD    Office Visit    3 weeks ago Epidermal inclusion cyst    [facility] Yo Schneider MD    Office Visit    1 month ago     [address] [place] Vahid Townsend, [place]    Office Visit

## 2022-10-04 NOTE — TELEPHONE ENCOUNTER
Parminder Aleman RN 10/4/2022 3:22 PM CDT        ----- Message -----  From: Nurys Huynh  Sent: 10/4/2022 2:13 PM CDT  To: Em Rn Triage  Subject: Vomiting and nausea     They told me to write a message to you.     That nothing in her specialty is causing my symptoms to talk to Dr Jaylin Morris and the kidney specialist.

## 2022-10-04 NOTE — TELEPHONE ENCOUNTER
----- Message from Michel Delong RN sent at 10/4/2022  3:22 PM CDT -----  Regarding: FW: Vomiting and nausea       ----- Message -----  From: Nella Jay  Sent: 10/4/2022   2:13 PM CDT  To: Em Rn Triage  Subject: Vomiting and nausea                              They told me to write a message to you.     That nothing in her specialty is causing my symptoms to talk to Dr Tracy Larios and the kidney specialist.

## 2022-10-05 ENCOUNTER — PATIENT MESSAGE (OUTPATIENT)
Dept: FAMILY MEDICINE CLINIC | Facility: CLINIC | Age: 32
End: 2022-10-05

## 2022-10-05 LAB
C TRACH DNA SPEC QL NAA+PROBE: NEGATIVE
N GONORRHOEA DNA SPEC QL NAA+PROBE: NEGATIVE

## 2022-10-05 RX ORDER — ONDANSETRON 4 MG/1
4 TABLET, ORALLY DISINTEGRATING ORAL EVERY 8 HOURS PRN
Qty: 20 TABLET | Refills: 0 | Status: SHIPPED | OUTPATIENT
Start: 2022-10-05

## 2022-10-06 LAB
GENITAL VAGINOSIS SCREEN: POSITIVE
TRICHOMONAS SCREEN: NEGATIVE

## 2022-10-11 ENCOUNTER — OFFICE VISIT (OUTPATIENT)
Dept: NEPHROLOGY | Facility: CLINIC | Age: 32
End: 2022-10-11
Payer: MEDICAID

## 2022-10-11 VITALS
DIASTOLIC BLOOD PRESSURE: 88 MMHG | SYSTOLIC BLOOD PRESSURE: 131 MMHG | HEIGHT: 67 IN | WEIGHT: 166 LBS | HEART RATE: 101 BPM | BODY MASS INDEX: 26.06 KG/M2

## 2022-10-11 DIAGNOSIS — N20.0 KIDNEY STONE: Primary | ICD-10-CM

## 2022-10-11 PROCEDURE — 3079F DIAST BP 80-89 MM HG: CPT | Performed by: INTERNAL MEDICINE

## 2022-10-11 PROCEDURE — 3008F BODY MASS INDEX DOCD: CPT | Performed by: INTERNAL MEDICINE

## 2022-10-11 PROCEDURE — 3075F SYST BP GE 130 - 139MM HG: CPT | Performed by: INTERNAL MEDICINE

## 2022-10-11 PROCEDURE — 99245 OFF/OP CONSLTJ NEW/EST HI 55: CPT | Performed by: INTERNAL MEDICINE

## 2022-10-11 RX ORDER — PARSLEY 450 MG
500 CAPSULE ORAL
COMMUNITY

## 2022-10-11 RX ORDER — ASCORBIC ACID 500 MG
1 TABLET ORAL DAILY
COMMUNITY

## 2022-10-11 NOTE — TELEPHONE ENCOUNTER
Benefits Investigation Summary received via fax from [employer] [place]. Primary: [employer]: [employer] was unable to verify benefits as [employer] does not speak to third parties. The provider must contact [employer] at [phone] for information regarding benefits and eligibility. Primary: [place]: Manged Medicaid    Product Coverage Xolair     PA is required. Please call or submit online for authorization. Turn around time is 2-3 days. Phone number is [phone]. The website to submit the authorization is www.[employer]. Doctor can Dora Kerr or obtain Xolair through [employer]. Patient's Medicaid Plan will consider up to 100 % of the allowed amount.

## 2022-10-12 NOTE — PROGRESS NOTES
10/12/22        Patient: Misty Mueller   YOB: 1990   Date of Visit: 10/11/2022       Dear  Dr. Cami Rivas, DO,      Thank you for referring Misty Mueller to my practice. Please find my assessment and plan below. As you know she is a 25-year-old female with a history of asthma, chronic urticaria, anxiety, ADHD, irritable bowel syndrome, chronic low back pain who I now had the pleasure of seeing for evaluation of possible renal colic. The patient states that she is pretty certain that she has passed at least 2 or 3 stones spontaneously. She noted something in the toilet bowl but never retrieve these for stone analysis. More recently she went to the emergency room on September 20, 2022 and again on September 25, 2022 with bilateral flank pain. She had CT scans done on both dates and no clear-cut renal calculi or hydronephrosis was identified. The ER physician told her that she might of passed the stone. Patient though continues to have lower abdominal pain and left flank pain on a daily basis. Symptoms seem to wax and wane in intensity but never really go away. She just saw her gynecologist and she has been treated for vaginosis. She was also having urinary tract symptoms with frequency urgency and dysuria. However urine culture on October 3 was negative. Again continues to have those symptoms. Her past medical history as stated above. Medications are as listed. Gets intermittent epidural injections for chronic lower back pain but she really feels her current symptoms are different than her lumbar pain. Social history she is a non-smoker. Family history is positive for frequent urinary tract infections and kidney stones. Review of system patient was states she is doing well without any chest pain, shortness of breath, or GI symptoms. Again continues to have these intermittent episodes of frequency urgency and dysuria.     I reviewed this CT scans performed in September with the patient. I told her there is no obvious pathology that would explain her ongoing lower abdominal and left flank pain. If she actually did pass a stone spontaneously those symptoms should have resolved. I also reassured her that her renal function on both those dates were normal.  As she  continues to have urinary tract symptoms with a negative urine culture recommended that she see urology for further work-up and evaluation. She has an appoint with them on November 9. Otherwise reinforced the importance of drinking plenty of fluids. Strongly advised that if she thinks she is passing a stone spontaneously she should retrieve it and bring it in for stone analysis. I do not need to see her for follow-up unless you feel it is clinically indicated. Thank you very much for allowing me to participate in the care of your patient. If you have any questions please feel free to call.            Sincerely,   Silviano Begum MD   [address]  [address] [facility]  [address] [facility] [address]    Document electronically generated by:  Silviano Begum MD

## 2022-10-12 NOTE — PATIENT INSTRUCTIONS
See urology as you are doing. If you do pass a kidney stone spontaneously make sure to retrieve it and bring it in for stone analysis.

## 2022-10-13 NOTE — TELEPHONE ENCOUNTER
[address] is not contracted with specialty pharmacy for "[employer]. Insurance (medicaid) dictates that patient must receive Xolair through [place]. Patient must receive Xolair through [address] [place] [address]. [employer] contacted at [phone], spoke with PA , Ale Santos.     PA for Xolair injection through DIANE [address] (NPI : [id number]) initiated over the telephone and approved. Xolair 150 mg/mL prefilled syringe injections through [address] [place] [address]    Effective Dates: 10/20/2022 -2/17/2023    PA #: [id number]    Awaiting Approval Letter to be received via fax. [employer] form completed reporting PA approval information as above for Buy and Bill through [facility]. Form faxed to [phone]. Fax confirmation received.

## 2022-10-18 ENCOUNTER — OFFICE VISIT (OUTPATIENT)
Dept: FAMILY MEDICINE CLINIC | Facility: CLINIC | Age: 32
End: 2022-10-18
Payer: MEDICAID

## 2022-10-18 VITALS
BODY MASS INDEX: 25.9 KG/M2 | HEART RATE: 104 BPM | HEIGHT: 67 IN | WEIGHT: 165 LBS | DIASTOLIC BLOOD PRESSURE: 82 MMHG | SYSTOLIC BLOOD PRESSURE: 146 MMHG | OXYGEN SATURATION: 98 %

## 2022-10-18 DIAGNOSIS — F41.9 ANXIETY: ICD-10-CM

## 2022-10-18 DIAGNOSIS — R19.7 DIARRHEA, UNSPECIFIED TYPE: ICD-10-CM

## 2022-10-18 DIAGNOSIS — R14.0 ABDOMINAL BLOATING: ICD-10-CM

## 2022-10-18 DIAGNOSIS — R11.2 NAUSEA AND VOMITING, UNSPECIFIED VOMITING TYPE: Primary | ICD-10-CM

## 2022-10-18 PROCEDURE — 3077F SYST BP >= 140 MM HG: CPT | Performed by: FAMILY MEDICINE

## 2022-10-18 PROCEDURE — 3008F BODY MASS INDEX DOCD: CPT | Performed by: FAMILY MEDICINE

## 2022-10-18 PROCEDURE — 99214 OFFICE O/P EST MOD 30 MIN: CPT | Performed by: FAMILY MEDICINE

## 2022-10-18 PROCEDURE — 3079F DIAST BP 80-89 MM HG: CPT | Performed by: FAMILY MEDICINE

## 2022-10-18 RX ORDER — OMALIZUMAB 150 MG/ML
300 INJECTION, SOLUTION SUBCUTANEOUS
Qty: 2 ML | Refills: 5 | Status: SHIPPED
Start: 2022-10-18

## 2022-10-18 NOTE — TELEPHONE ENCOUNTER
[address] [place] [address] Xolair Order form, Signed Rx for Raoul Gu order set signed by Dr. Arash Swain and prior authorization faxed to [address] [place] [address] at [phone]. Awaiting fax confirmation.

## 2022-10-18 NOTE — TELEPHONE ENCOUNTER
Xolair PA approved for injection through [address] [place] [address]. [address]. Xolair Order Sheet completed. Please review and sign. Xolair Order Set completed and printed, please sign. Xolair Rx pended for printing, please sign.

## 2022-10-19 PROBLEM — L50.1 CHRONIC IDIOPATHIC URTICARIA: Status: ACTIVE | Noted: 2022-10-19

## 2022-10-19 NOTE — TELEPHONE ENCOUNTER
RN called pt with updates listed below. Pt verbalizes understanding and reports she will notify us as soon as possible via [employer] when she has her first Xolair injection scheduled with the DIANE Zhao so we can get her scheduled for a follow up with Dr. Delma Gomes. Pt to keep us updated.

## 2022-10-20 ENCOUNTER — PATIENT MESSAGE (OUTPATIENT)
Dept: PHYSICAL MEDICINE AND REHAB | Facility: CLINIC | Age: 32
End: 2022-10-20

## 2022-10-20 NOTE — TELEPHONE ENCOUNTER
Patient is currently scheduled for the below injection on 11/18/22. Patient sent another message requesting Rejeana Mcburney review old cervical spine MRI that was completed on 2/18/2020 to see if there is anything on that old MRI that would be causing her painful numbness/tingling in both hands and arms. Patient wondering if a new cervical spine MRI is needed. Message relayed to Rejeana Mcburney. Awaiting recommendations.

## 2022-10-21 ENCOUNTER — PATIENT MESSAGE (OUTPATIENT)
Dept: NEUROLOGY | Facility: CLINIC | Age: 32
End: 2022-10-21

## 2022-10-21 ENCOUNTER — PATIENT MESSAGE (OUTPATIENT)
Dept: PHYSICAL MEDICINE AND REHAB | Facility: CLINIC | Age: 32
End: 2022-10-21

## 2022-10-21 PROBLEM — M50.90 CERVICAL DISC DISEASE: Status: ACTIVE | Noted: 2022-10-21

## 2022-10-21 PROBLEM — M50.20 CERVICAL HERNIATED DISC: Status: ACTIVE | Noted: 2022-10-21

## 2022-10-21 NOTE — TELEPHONE ENCOUNTER
I reviewed her MRI scan of the cervical spine. She had 2 mild bulging discs and one mild herniated disc. The herniated disc could be the cause of her hand symptoms. It would worth getting a new MRI of the cervical spine if the hand symptoms are worse since 2 years ago.

## 2022-10-21 NOTE — TELEPHONE ENCOUNTER
MRI SPINE CERVICAL (CPT=72141) scheduling reminder sent via [facility]

## 2022-10-24 ENCOUNTER — HOSPITAL ENCOUNTER (OUTPATIENT)
Age: 32
Discharge: HOME | End: 2022-10-24
Payer: MEDICAID

## 2022-10-24 ENCOUNTER — APPOINTMENT (OUTPATIENT)
Dept: GENERAL RADIOLOGY | Age: 32
End: 2022-10-24
Attending: NURSE PRACTITIONER
Payer: MEDICAID

## 2022-10-24 VITALS
RESPIRATION RATE: 18 BRPM | OXYGEN SATURATION: 100 % | SYSTOLIC BLOOD PRESSURE: 149 MMHG | TEMPERATURE: 97 F | DIASTOLIC BLOOD PRESSURE: 107 MMHG | HEART RATE: 102 BPM

## 2022-10-24 DIAGNOSIS — M54.42 LOW BACK PAIN WITH BILATERAL SCIATICA, UNSPECIFIED BACK PAIN LATERALITY, UNSPECIFIED CHRONICITY: ICD-10-CM

## 2022-10-24 DIAGNOSIS — R07.81 RIB PAIN: ICD-10-CM

## 2022-10-24 DIAGNOSIS — S16.1XXA STRAIN OF NECK MUSCLE, INITIAL ENCOUNTER: ICD-10-CM
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
## 2022-10-24 DIAGNOSIS — M54.41 LOW BACK PAIN WITH BILATERAL SCIATICA, UNSPECIFIED BACK PAIN LATERALITY, UNSPECIFIED CHRONICITY: ICD-10-CM

## 2022-10-24 PROCEDURE — 71111 X-RAY EXAM RIBS/CHEST4/> VWS: CPT | Performed by: NURSE PRACTITIONER

## 2022-10-24 PROCEDURE — 72040 X-RAY EXAM NECK SPINE 2-3 VW: CPT | Performed by: NURSE PRACTITIONER

## 2022-10-24 PROCEDURE — 99213 OFFICE O/P EST LOW 20 MIN: CPT | Performed by: NURSE PRACTITIONER

## 2022-10-24 PROCEDURE — 72100 X-RAY EXAM L-S SPINE 2/3 VWS: CPT | Performed by: NURSE PRACTITIONER

## 2022-10-24 RX ORDER — HYDROCODONE BITARTRATE AND ACETAMINOPHEN 5; 325 MG/1; MG/1
1 TABLET ORAL EVERY 6 HOURS PRN
Qty: 10 TABLET | Refills: 0 | Status: SHIPPED | OUTPATIENT
Start: 2022-10-24 | End: 2022-10-26

## 2022-10-24 RX ORDER — TIZANIDINE 4 MG/1
4 TABLET ORAL 3 TIMES DAILY PRN
Qty: 12 TABLET | Refills: 0 | Status: SHIPPED | OUTPATIENT
Start: 2022-10-24 | End: 2022-10-26

## 2022-10-24 NOTE — TELEPHONE ENCOUNTER
Spoke with patient who stated her dog pulled her down. States her arm was over her head when she fell. States it hurts to breath and feels like she has whiplash. Advised patient since she is having pain while breathing to head to urgent care to be evaluated and to make sure she doesn't have any fractures or anything else going on. Patient understood and agreed. Patient will keep our office updated on her symptoms. Nothing further needed at this time. Update forwarded to [address]

## 2022-10-25 ENCOUNTER — TELEPHONE (OUTPATIENT)
Dept: PHYSICAL MEDICINE AND REHAB | Facility: CLINIC | Age: 32
End: 2022-10-25

## 2022-10-25 NOTE — ED INITIAL ASSESSMENT (HPI)
Pt here with complaints of lower, mid and upper back pain,pt states she was walking her dog yesterday and states her dog was very hyper and pulled her sown and she fell backward on the concrete, pt states she has pain while sitting or with any movement, pt denies any head injury

## 2022-10-26 RX ORDER — TIZANIDINE 4 MG/1
8 TABLET ORAL 3 TIMES DAILY PRN
Qty: 180 TABLET | Refills: 0 | Status: SHIPPED | OUTPATIENT
Start: 2022-10-26

## 2022-10-26 NOTE — TELEPHONE ENCOUNTER
Per : \"ok for meds as stated in your note. She would need to be seen first before any imaging because I am not sure what she would need to have imaged at this time. \"  ok with Tizanidine 4 mg - 2 tabs TID with a month supply    Ok for appt tomorrow 10/27/22 @ 1:30 per . Spoke with patient and reviewed the above. Patient understood. Tizanidine rx sent to patient's pharmacy. F/u appt scheduled.      Norco rx forwarded to [address] for approval.

## 2022-10-26 NOTE — TELEPHONE ENCOUNTER
Spoke with patient for an update who stated:  -She went to Urgent Care and was evaluated on 10/24/22. Xrays were completed and there were no fractures.  -Patient states her symptoms are still bad  -Patient also stated she developed tongue numbness and numbness from the waist down after calling the office on Monday.  -Urgent care informed her that if this happens again to go to the ER and to have a CT completed. -Patient stated this comes and goes. Not currently experiencing this now.  -Currently taking Norco and Tizanidine. Taking Norco 5-325- 1 tablet during the day a couple times and 2 tablets at night. Taking Tizanidine 4 mg- 2 tablets TID (States taking only 1 tablet does not help.)  -Patient will need a refill of the Norco and Tizanidine, as she has 1 tab left of Tizanidine and 2 tabs left of Norco.  (States she cannot take Tramadol as this makes her nauseous.)    Norco filled last on 10/24/22 #10. Tizanidine filled last on 10/24/22 #12. Update relayed to [address] Awaiting feedback.

## 2022-10-27 RX ORDER — HYDROCODONE BITARTRATE AND ACETAMINOPHEN 5; 325 MG/1; MG/1
1 TABLET ORAL EVERY 8 HOURS PRN
Qty: 30 TABLET | Refills: 0 | Status: SHIPPED | OUTPATIENT
Start: 2022-10-27

## 2022-10-27 NOTE — TELEPHONE ENCOUNTER
Per  patient to reschedule appt. Appt rescheduled to 11/2/22 at 1 pm in OP. Patient confirmed. Patient was advised to head to the ER with any new/changes to bowel bladder symptoms. Patient stated she did have some bowel leakage on Monday, but it happened after she had lunch so urgent care told her to disregard this. Informed patient if this does happen again to present to the ER. Advised her to inform ER of her history of neck and back issues and that she was instructed to go to the ER with any bowel or bladder issues. Patient understood and was very appreciative for the call. Nothing further needed at this time.

## 2022-10-27 NOTE — TELEPHONE ENCOUNTER
Spoke with patient who stated she thought the appointment was for Friday. Apologized to patient as there must have been a misunderstanding. Explained to patient that Echo Ojeda is not usually in the office on Fridays as that is when he does injections. Patient stated if she knew the appointment was for today she would not have been able to make it as she was not able to be absent from work today. Patient stated she is still having intermittent numbness to tongue and from the waist down. When she sits this causes her to go numb from the waist down. She is still dropping things. States this is not new, but it is getting worse. Informed patient update will be relayed to Echo Ojeda for a new plan. Patient understood.

## 2022-10-28 ENCOUNTER — LAB ENCOUNTER (OUTPATIENT)
Dept: LAB | Facility: HOSPITAL | Age: 32
End: 2022-10-28
Attending: FAMILY MEDICINE
Payer: MEDICAID

## 2022-10-28 ENCOUNTER — HOSPITAL ENCOUNTER (OUTPATIENT)
Dept: ULTRASOUND IMAGING | Age: 32
Discharge: HOME | End: 2022-10-28
Attending: FAMILY MEDICINE
Payer: MEDICAID

## 2022-10-28 DIAGNOSIS — R14.0 ABDOMINAL BLOATING: ICD-10-CM

## 2022-10-28 DIAGNOSIS — R19.7 DIARRHEA, UNSPECIFIED TYPE: ICD-10-CM

## 2022-10-28 DIAGNOSIS — R11.2 NAUSEA AND VOMITING, UNSPECIFIED VOMITING TYPE: ICD-10-CM

## 2022-10-28 LAB
ALBUMIN SERPL-MCNC: 3.8 G/DL (ref 3.4–5)
ALBUMIN/GLOB SERPL: 1 {RATIO} (ref 1–2)
ALP LIVER SERPL-CCNC: 64 U/L
ALT SERPL-CCNC: 33 U/L
ANION GAP SERPL CALC-SCNC: 9 MMOL/L (ref 0–18)
AST SERPL-CCNC: 18 U/L (ref 15–37)
BASOPHILS # BLD AUTO: 0.03 X10(3) UL (ref 0–0.2)
BASOPHILS NFR BLD AUTO: 0.3 %
BILIRUB SERPL-MCNC: 0.4 MG/DL (ref 0.1–2)
BUN BLD-MCNC: 6 MG/DL (ref 7–18)
BUN/CREAT SERPL: 7.9 (ref 10–20)
CALCIUM BLD-MCNC: 9.2 MG/DL (ref 8.5–10.1)
CHLORIDE SERPL-SCNC: 107 MMOL/L (ref 98–112)
CO2 SERPL-SCNC: 24 MMOL/L (ref 21–32)
CREAT BLD-MCNC: 0.76 MG/DL
DEPRECATED RDW RBC AUTO: 44.4 FL (ref 35.1–46.3)
EOSINOPHIL # BLD AUTO: 0.16 X10(3) UL (ref 0–0.7)
EOSINOPHIL NFR BLD AUTO: 1.5 %
ERYTHROCYTE [DISTWIDTH] IN BLOOD BY AUTOMATED COUNT: 13.2 % (ref 11–15)
FASTING STATUS PATIENT QL REPORTED: YES
GFR SERPLBLD BASED ON 1.73 SQ M-ARVRAT: 105 ML/MIN/1.73M2 (ref 60–?)
GLOBULIN PLAS-MCNC: 4 G/DL (ref 2.8–4.4)
GLUCOSE BLD-MCNC: 88 MG/DL (ref 70–99)
HCT VFR BLD AUTO: 43 %
HGB BLD-MCNC: 13.8 G/DL
IMM GRANULOCYTES # BLD AUTO: 0.05 X10(3) UL (ref 0–1)
IMM GRANULOCYTES NFR BLD: 0.5 %
LYMPHOCYTES # BLD AUTO: 2.04 X10(3) UL (ref 1–4)
LYMPHOCYTES NFR BLD AUTO: 18.7 %
MCH RBC QN AUTO: 29.1 PG (ref 26–34)
MCHC RBC AUTO-ENTMCNC: 32.1 G/DL (ref 31–37)
MCV RBC AUTO: 90.5 FL
MONOCYTES # BLD AUTO: 0.51 X10(3) UL (ref 0.1–1)
MONOCYTES NFR BLD AUTO: 4.7 %
NEUTROPHILS # BLD AUTO: 8.11 X10 (3) UL (ref 1.5–7.7)
NEUTROPHILS # BLD AUTO: 8.11 X10(3) UL (ref 1.5–7.7)
NEUTROPHILS NFR BLD AUTO: 74.3 %
OSMOLALITY SERPL CALC.SUM OF ELEC: 287 MOSM/KG (ref 275–295)
PLATELET # BLD AUTO: 470 10(3)UL (ref 150–450)
POTASSIUM SERPL-SCNC: 4 MMOL/L (ref 3.5–5.1)
PROT SERPL-MCNC: 7.8 G/DL (ref 6.4–8.2)
RBC # BLD AUTO: 4.75 X10(6)UL
SODIUM SERPL-SCNC: 140 MMOL/L (ref 136–145)
TSI SER-ACNC: 0.42 MIU/ML (ref 0.36–3.74)
WBC # BLD AUTO: 10.9 X10(3) UL (ref 4–11)

## 2022-10-28 PROCEDURE — 83013 H PYLORI (C-13) BREATH: CPT

## 2022-10-28 PROCEDURE — 76700 US EXAM ABDOM COMPLETE: CPT | Performed by: FAMILY MEDICINE

## 2022-10-28 PROCEDURE — 80053 COMPREHEN METABOLIC PANEL: CPT | Performed by: FAMILY MEDICINE

## 2022-10-28 PROCEDURE — 85025 COMPLETE CBC W/AUTO DIFF WBC: CPT | Performed by: FAMILY MEDICINE

## 2022-10-28 PROCEDURE — 84443 ASSAY THYROID STIM HORMONE: CPT | Performed by: FAMILY MEDICINE

## 2022-10-28 PROCEDURE — 36415 COLL VENOUS BLD VENIPUNCTURE: CPT | Performed by: FAMILY MEDICINE

## 2022-10-30 ENCOUNTER — LAB ENCOUNTER (OUTPATIENT)
Dept: LAB | Facility: HOSPITAL | Age: 32
End: 2022-10-30
Attending: SURGERY
Payer: MEDICAID

## 2022-10-30 DIAGNOSIS — Z01.818 PREOP TESTING: ICD-10-CM

## 2022-10-30 LAB
H. PYLORI BREATH TEST: NEGATIVE
SARS-COV-2 RNA RESP QL NAA+PROBE: NOT DETECTED

## 2022-10-31 ENCOUNTER — TELEPHONE (OUTPATIENT)
Dept: SURGERY | Facility: CLINIC | Age: 32
End: 2022-10-31

## 2022-10-31 ENCOUNTER — TELEPHONE (OUTPATIENT)
Dept: PHYSICAL MEDICINE AND REHAB | Facility: CLINIC | Age: 32
End: 2022-10-31

## 2022-10-31 DIAGNOSIS — L72.0 EPIDERMAL INCLUSION CYST: Primary | ICD-10-CM

## 2022-10-31 NOTE — TELEPHONE ENCOUNTER
Please call to discuss her having surgery on Wednesday and is concerned because she has an appt with Dr Salvador Silverio on the same day.

## 2022-11-01 ENCOUNTER — OFFICE VISIT (OUTPATIENT)
Dept: PHYSICAL MEDICINE AND REHAB | Facility: CLINIC | Age: 32
End: 2022-11-01
Payer: MEDICAID

## 2022-11-01 ENCOUNTER — PATIENT MESSAGE (OUTPATIENT)
Dept: INTERNAL MEDICINE CLINIC | Facility: CLINIC | Age: 32
End: 2022-11-01

## 2022-11-01 VITALS
OXYGEN SATURATION: 99 % | HEIGHT: 67 IN | BODY MASS INDEX: 25.11 KG/M2 | HEART RATE: 105 BPM | WEIGHT: 160 LBS | DIASTOLIC BLOOD PRESSURE: 99 MMHG | SYSTOLIC BLOOD PRESSURE: 160 MMHG

## 2022-11-01 DIAGNOSIS — M48.061 LUMBAR FORAMINAL STENOSIS: ICD-10-CM

## 2022-11-01 DIAGNOSIS — M50.90 CERVICAL DISC DISEASE: Primary | ICD-10-CM

## 2022-11-01 DIAGNOSIS — F31.81 BIPOLAR II DISORDER (HCC): ICD-10-CM

## 2022-11-01 DIAGNOSIS — M50.20 CERVICAL HERNIATED DISC: ICD-10-CM

## 2022-11-01 DIAGNOSIS — M54.16 LUMBAR RADICULOPATHY: ICD-10-CM

## 2022-11-01 DIAGNOSIS — M51.9 LUMBAR DISC DISEASE: ICD-10-CM

## 2022-11-01 DIAGNOSIS — R20.0 BILATERAL HAND NUMBNESS: ICD-10-CM

## 2022-11-01 DIAGNOSIS — M54.12 CERVICAL RADICULOPATHY: ICD-10-CM

## 2022-11-01 PROCEDURE — 3080F DIAST BP >= 90 MM HG: CPT | Performed by: PHYSICAL MEDICINE & REHABILITATION

## 2022-11-01 PROCEDURE — 3077F SYST BP >= 140 MM HG: CPT | Performed by: PHYSICAL MEDICINE & REHABILITATION

## 2022-11-01 PROCEDURE — 3008F BODY MASS INDEX DOCD: CPT | Performed by: PHYSICAL MEDICINE & REHABILITATION

## 2022-11-01 PROCEDURE — 99214 OFFICE O/P EST MOD 30 MIN: CPT | Performed by: PHYSICAL MEDICINE & REHABILITATION

## 2022-11-01 RX ORDER — PREDNISONE 10 MG/1
10 TABLET ORAL DAILY
Qty: 28 EACH | Refills: 0 | Status: SHIPPED | OUTPATIENT
Start: 2022-11-01

## 2022-11-01 NOTE — PATIENT INSTRUCTIONS
Plan  She will take a prednisone taper and will let me know in one week who she is feeling. She will get into the PT for the cervical and lumbar spines. She will continue with the Zanaflex and the Norco for the pain. She will follow up as scheduled.

## 2022-11-02 ENCOUNTER — HOSPITAL ENCOUNTER (OUTPATIENT)
Facility: HOSPITAL | Age: 32
Setting detail: HOSPITAL OUTPATIENT SURGERY
Discharge: HOME | End: 2022-11-02
Attending: SURGERY | Admitting: SURGERY
Payer: MEDICAID

## 2022-11-02 ENCOUNTER — ANESTHESIA EVENT (OUTPATIENT)
Dept: SURGERY | Facility: HOSPITAL | Age: 32
End: 2022-11-02
Payer: MEDICAID

## 2022-11-02 ENCOUNTER — ANESTHESIA (OUTPATIENT)
Dept: SURGERY | Facility: HOSPITAL | Age: 32
End: 2022-11-02
Payer: MEDICAID

## 2022-11-02 VITALS
BODY MASS INDEX: 25.39 KG/M2 | HEART RATE: 80 BPM | TEMPERATURE: 98 F | SYSTOLIC BLOOD PRESSURE: 123 MMHG | DIASTOLIC BLOOD PRESSURE: 75 MMHG | RESPIRATION RATE: 16 BRPM | HEIGHT: 66 IN | OXYGEN SATURATION: 100 % | WEIGHT: 158 LBS

## 2022-11-02 DIAGNOSIS — Z01.818 PREOP TESTING: Primary | ICD-10-CM

## 2022-11-02 DIAGNOSIS — L72.0 EPIDERMAL INCLUSION CYST: ICD-10-CM

## 2022-11-02 LAB
B-HCG UR QL: NEGATIVE
GLUCOSE BLDC GLUCOMTR-MCNC: 112 MG/DL (ref 70–99)

## 2022-11-02 PROCEDURE — 0JBC0ZZ EXCISION OF PELVIC REGION SUBCUTANEOUS TISSUE AND FASCIA, OPEN APPROACH: ICD-10-PCS | Performed by: SURGERY

## 2022-11-02 PROCEDURE — 12032 INTMD RPR S/A/T/EXT 2.6-7.5: CPT | Performed by: SURGERY

## 2022-11-02 PROCEDURE — 11402 EXC TR-EXT B9+MARG 1.1-2 CM: CPT | Performed by: SURGERY

## 2022-11-02 RX ORDER — LIDOCAINE HYDROCHLORIDE 10 MG/ML
INJECTION, SOLUTION EPIDURAL; INFILTRATION; INTRACAUDAL; PERINEURAL AS NEEDED
Status: DISCONTINUED | OUTPATIENT
Start: 2022-11-02 | End: 2022-11-02 | Stop reason: SURG

## 2022-11-02 RX ORDER — HYDROMORPHONE HYDROCHLORIDE 1 MG/ML
0.4 INJECTION, SOLUTION INTRAMUSCULAR; INTRAVENOUS; SUBCUTANEOUS EVERY 5 MIN PRN
Status: DISCONTINUED | OUTPATIENT
Start: 2022-11-02 | End: 2022-11-02

## 2022-11-02 RX ORDER — ACETAMINOPHEN 500 MG
1000 TABLET ORAL ONCE
Status: COMPLETED | OUTPATIENT
Start: 2022-11-02 | End: 2022-11-02

## 2022-11-02 RX ORDER — MIDAZOLAM HYDROCHLORIDE 1 MG/ML
INJECTION INTRAMUSCULAR; INTRAVENOUS AS NEEDED
Status: DISCONTINUED | OUTPATIENT
Start: 2022-11-02 | End: 2022-11-02 | Stop reason: SURG

## 2022-11-02 RX ORDER — SODIUM CHLORIDE, SODIUM LACTATE, POTASSIUM CHLORIDE, CALCIUM CHLORIDE 600; 310; 30; 20 MG/100ML; MG/100ML; MG/100ML; MG/100ML
INJECTION, SOLUTION INTRAVENOUS CONTINUOUS
Status: DISCONTINUED | OUTPATIENT
Start: 2022-11-02 | End: 2022-11-02

## 2022-11-02 RX ORDER — NALOXONE HYDROCHLORIDE 0.4 MG/ML
80 INJECTION, SOLUTION INTRAMUSCULAR; INTRAVENOUS; SUBCUTANEOUS AS NEEDED
Status: DISCONTINUED | OUTPATIENT
Start: 2022-11-02 | End: 2022-11-02

## 2022-11-02 RX ORDER — MORPHINE SULFATE 10 MG/ML
6 INJECTION, SOLUTION INTRAMUSCULAR; INTRAVENOUS EVERY 10 MIN PRN
Status: DISCONTINUED | OUTPATIENT
Start: 2022-11-02 | End: 2022-11-02

## 2022-11-02 RX ORDER — MORPHINE SULFATE 4 MG/ML
4 INJECTION, SOLUTION INTRAMUSCULAR; INTRAVENOUS EVERY 10 MIN PRN
Status: DISCONTINUED | OUTPATIENT
Start: 2022-11-02 | End: 2022-11-02

## 2022-11-02 RX ORDER — BUPIVACAINE HYDROCHLORIDE AND EPINEPHRINE 5; 5 MG/ML; UG/ML
INJECTION, SOLUTION PERINEURAL AS NEEDED
Status: DISCONTINUED | OUTPATIENT
Start: 2022-11-02 | End: 2022-11-02 | Stop reason: HOSPADM

## 2022-11-02 RX ORDER — PROCHLORPERAZINE EDISYLATE 5 MG/ML
5 INJECTION INTRAMUSCULAR; INTRAVENOUS EVERY 8 HOURS PRN
Status: DISCONTINUED | OUTPATIENT
Start: 2022-11-02 | End: 2022-11-02

## 2022-11-02 RX ORDER — MORPHINE SULFATE 4 MG/ML
2 INJECTION, SOLUTION INTRAMUSCULAR; INTRAVENOUS EVERY 10 MIN PRN
Status: DISCONTINUED | OUTPATIENT
Start: 2022-11-02 | End: 2022-11-02

## 2022-11-02 RX ORDER — HYDROMORPHONE HYDROCHLORIDE 1 MG/ML
0.6 INJECTION, SOLUTION INTRAMUSCULAR; INTRAVENOUS; SUBCUTANEOUS EVERY 5 MIN PRN
Status: DISCONTINUED | OUTPATIENT
Start: 2022-11-02 | End: 2022-11-02

## 2022-11-02 RX ORDER — HYDROMORPHONE HYDROCHLORIDE 1 MG/ML
0.2 INJECTION, SOLUTION INTRAMUSCULAR; INTRAVENOUS; SUBCUTANEOUS EVERY 5 MIN PRN
Status: DISCONTINUED | OUTPATIENT
Start: 2022-11-02 | End: 2022-11-02

## 2022-11-02 RX ORDER — ONDANSETRON 2 MG/ML
4 INJECTION INTRAMUSCULAR; INTRAVENOUS EVERY 6 HOURS PRN
Status: DISCONTINUED | OUTPATIENT
Start: 2022-11-02 | End: 2022-11-02

## 2022-11-02 RX ADMIN — MIDAZOLAM HYDROCHLORIDE 2 MG: 1 INJECTION INTRAMUSCULAR; INTRAVENOUS at 10:03:00

## 2022-11-02 RX ADMIN — LIDOCAINE HYDROCHLORIDE 50 MG: 10 INJECTION, SOLUTION EPIDURAL; INFILTRATION; INTRACAUDAL; PERINEURAL at 10:07:00

## 2022-11-02 RX ADMIN — SODIUM CHLORIDE, SODIUM LACTATE, POTASSIUM CHLORIDE, CALCIUM CHLORIDE: 600; 310; 30; 20 INJECTION, SOLUTION INTRAVENOUS at 10:36:00

## 2022-11-02 RX ADMIN — SODIUM CHLORIDE, SODIUM LACTATE, POTASSIUM CHLORIDE, CALCIUM CHLORIDE: 600; 310; 30; 20 INJECTION, SOLUTION INTRAVENOUS at 10:03:00

## 2022-11-02 NOTE — INTERVAL H&P NOTE
Pre-op Diagnosis: Epidermal inclusion cyst [L72.0]    The above referenced H&P was reviewed by Sara Doan MD on 11/2/2022, the patient was examined and no significant changes have occurred in the patient's condition since the H&P was performed. I discussed with the patient and/or legal representative the potential benefits, risks and side effects of this procedure; the likelihood of the patient achieving goals; and potential problems that might occur during recuperation. I discussed reasonable alternatives to the procedure, including risks, benefits and side effects related to the alternatives and risks related to not receiving this procedure. We will proceed with procedure as planned.

## 2022-11-02 NOTE — BRIEF OP NOTE
Pre-Operative Diagnosis: Epidermal inclusion cyst [L72.0]     Post-Operative Diagnosis: Epidermal inclusion cyst [L72.0]      Procedure Performed:   Excisional biopsy of the left groin cyst    Surgeon(s) and Role:     Cyn Méndez MD - Primary    Assistant(s):         Surgical Findings: same     Specimen: skin cyst     Estimated Blood Loss: Blood Output: 5 mL (11/2/2022 10:33 AM)      Dictation Number:   [id number]    Krunal Blake MD  11/2/2022  10:51 AM

## 2022-11-03 ENCOUNTER — TELEPHONE (OUTPATIENT)
Dept: CASE MANAGEMENT | Age: 32
End: 2022-11-03

## 2022-11-03 ENCOUNTER — LAB ENCOUNTER (OUTPATIENT)
Dept: LAB | Facility: HOSPITAL | Age: 32
End: 2022-11-03
Attending: FAMILY MEDICINE
Payer: MEDICAID

## 2022-11-03 ENCOUNTER — HOSPITAL ENCOUNTER (OUTPATIENT)
Dept: NUCLEAR MEDICINE | Facility: HOSPITAL | Age: 32
Discharge: HOME | End: 2022-11-03
Attending: FAMILY MEDICINE
Payer: MEDICAID

## 2022-11-03 DIAGNOSIS — R19.7 DIARRHEA, UNSPECIFIED TYPE: ICD-10-CM

## 2022-11-03 DIAGNOSIS — R11.2 NAUSEA AND VOMITING, UNSPECIFIED VOMITING TYPE: ICD-10-CM

## 2022-11-03 DIAGNOSIS — R14.0 ABDOMINAL BLOATING: ICD-10-CM

## 2022-11-03 LAB
C DIFF TOX B STL QL: NEGATIVE
NOROVIRUS GI/GII PCR: NEGATIVE

## 2022-11-03 PROCEDURE — 87493 C DIFF AMPLIFIED PROBE: CPT

## 2022-11-03 PROCEDURE — 87045 FECES CULTURE AEROBIC BACT: CPT

## 2022-11-03 PROCEDURE — 87798 DETECT AGENT NOS DNA AMP: CPT

## 2022-11-03 PROCEDURE — 87427 SHIGA-LIKE TOXIN AG IA: CPT

## 2022-11-03 PROCEDURE — 78264 GASTRIC EMPTYING IMG STUDY: CPT | Performed by: FAMILY MEDICINE

## 2022-11-03 PROCEDURE — 87046 STOOL CULTR AEROBIC BACT EA: CPT

## 2022-11-03 NOTE — TELEPHONE ENCOUNTER
MRI SPINE CERVICAL (CPT=72141) Referral # [id number]    MRI scheduled for 11/08/22     Status: DENIED     The reason is:        Case Number: [id number]    Denial determination date: 10/26/22    Peer to peer and reconsideration is no longer available with Health plan    *Next option, file 1st level of appeal    Notified Dr. Elisa Everett for further options     [facility] of [facility]

## 2022-11-03 NOTE — TELEPHONE ENCOUNTER
The health plan has denied request for MRI Cervical Spine. Listed below is the denial rationale. Please reach out to patient with plan of care.      Thank you,   Oscar

## 2022-11-03 NOTE — OPERATIVE REPORT
[facility]    PATIENT'S NAME: Lai Diaz   ATTENDING PHYSICIAN: Gina Javier MD   OPERATING PHYSICIAN: Gina Javier MD   PATIENT ACCOUNT#:   [de-identified]    LOCATION:  [address]  MEDICAL RECORD #:   [id number]       YOB: 1990  ADMISSION DATE:       11/02/2022      OPERATION DATE:  11/02/2022    OPERATIVE REPORT      PREOPERATIVE DIAGNOSIS:  Epidermal inclusion cyst of the trunk. POSTOPERATIVE DIAGNOSIS:  Epidermal inclusion cyst of the trunk. PROCEDURE:    1. Excisional biopsy left groin skin cyst (1.1 cm diameter). 2.   Layered closure (2.8 cm length). COMPLICATIONS:  None. ESTIMATED BLOOD LOSS:  5 mL. PATHOLOGY SPECIMEN:  Skin cyst.    INDICATIONS:  This 69-year-old woman has had a cystic process in the skin in the low left groin area near the labia, on the labial side of the groin, but not an obvious Bartholin's cyst.  It has been a source of intermittent drainage and pain and excision is indicated to relieve those symptoms and to rule out a malignancy and avoid infection in the future. OPERATIVE TECHNIQUE:  Patient and I identified the area of interest together preoperatively. She was taken to the operating room where under IV sedation, the left groin and perineal area was prepped and draped in the usual aseptic fashion. Skin and subcutaneous tissue were infiltrated with 0.5% Marcaine with epinephrine. I made an elliptical skin incision to include all of the skin directly anterior to this slightly raised subcutaneous process. Incision was made along lines of skin tension and extended into normal-appearing subcutaneous tissue using electrocautery for hemostasis. There was no obvious abscess and very little inflammation currently. The lesion was excised and sent to Pathology. The wound was closed in layers with 3-0 Vicryl and 4-0 Vicryl suture. Surgical glue was applied. Overall, the patient tolerated the procedure well.   She was taken to the recovery area in stable condition.      Dictated By Thiago Aguilera MD  d: 11/02/2022 10:59:29  t: 11/02/2022 20:44:17  [facility] [id number]  [facility]/

## 2022-11-04 NOTE — TELEPHONE ENCOUNTER
Since the MRI was denied, she will need to let me know how she is feeling after taking the prednisone and doing 4-6 weeks of the PT.

## 2022-11-05 ENCOUNTER — VIRTUAL PHONE E/M (OUTPATIENT)
Dept: ALLERGY | Facility: CLINIC | Age: 32
End: 2022-11-05
Payer: MEDICAID

## 2022-11-05 DIAGNOSIS — J45.909 EXTRINSIC ASTHMA WITHOUT COMPLICATION, UNSPECIFIED ASTHMA SEVERITY, UNSPECIFIED WHETHER PERSISTENT: ICD-10-CM

## 2022-11-05 DIAGNOSIS — F90.9 ATTENTION DEFICIT HYPERACTIVITY DISORDER (ADHD), UNSPECIFIED ADHD TYPE: ICD-10-CM

## 2022-11-05 DIAGNOSIS — L50.1 CHRONIC IDIOPATHIC URTICARIA: Primary | ICD-10-CM

## 2022-11-05 DIAGNOSIS — Z92.29 COVID-19 VACCINE SERIES COMPLETED: ICD-10-CM

## 2022-11-05 DIAGNOSIS — Z23 FLU VACCINE NEED: ICD-10-CM

## 2022-11-05 DIAGNOSIS — J30.1 SEASONAL ALLERGIC RHINITIS DUE TO POLLEN: ICD-10-CM

## 2022-11-05 PROCEDURE — 99214 OFFICE O/P EST MOD 30 MIN: CPT | Performed by: ALLERGY & IMMUNOLOGY

## 2022-11-06 RX ORDER — DEXTROAMPHETAMINE SACCHARATE, AMPHETAMINE ASPARTATE MONOHYDRATE, DEXTROAMPHETAMINE SULFATE AND AMPHETAMINE SULFATE 2.5; 2.5; 2.5; 2.5 MG/1; MG/1; MG/1; MG/1
10 CAPSULE, EXTENDED RELEASE ORAL EVERY MORNING
Qty: 30 CAPSULE | Refills: 0 | Status: SHIPPED | OUTPATIENT
Start: 2022-11-06

## 2022-11-06 NOTE — TELEPHONE ENCOUNTER
Controlled medication pending for review. Please change to phone in, fax, or print script if not being sent electronically.     Last Rx: 10/4/22  LOV: 10/18/22

## 2022-11-07 ENCOUNTER — OFFICE VISIT (OUTPATIENT)
Dept: HEMATOLOGY/ONCOLOGY | Facility: HOSPITAL | Age: 32
End: 2022-11-07
Attending: Other
Payer: MEDICAID

## 2022-11-07 VITALS
TEMPERATURE: 99 F | HEART RATE: 93 BPM | RESPIRATION RATE: 16 BRPM | SYSTOLIC BLOOD PRESSURE: 131 MMHG | OXYGEN SATURATION: 100 % | DIASTOLIC BLOOD PRESSURE: 81 MMHG

## 2022-11-07 DIAGNOSIS — L50.1 IDIOPATHIC URTICARIA: ICD-10-CM

## 2022-11-07 DIAGNOSIS — L50.1 CHRONIC IDIOPATHIC URTICARIA: Primary | ICD-10-CM

## 2022-11-07 PROCEDURE — 96372 THER/PROPH/DIAG INJ SC/IM: CPT

## 2022-11-07 RX ORDER — BUDESONIDE AND FORMOTEROL FUMARATE DIHYDRATE 160; 4.5 UG/1; UG/1
2 AEROSOL RESPIRATORY (INHALATION) 2 TIMES DAILY
Qty: 3 EACH | Refills: 0 | Status: SHIPPED | OUTPATIENT
Start: 2022-11-07

## 2022-11-07 NOTE — TELEPHONE ENCOUNTER
Spoke with patient who stated she is doing much better on the prednisone. Patient currently 7/10 today. States she has another day left of the prednisone. Patient will call on Wednesday with an update. Patient stated she has already tried PT within the past 6 months for her neck and back pain and this has made her feel worse. Per [facility] patient had 9 PT sessions from 7/18/22- 8/31/22. Patient also had 17 PT sessions from 1/24/22-4/13/22. Informed patient will relay this update on to [address] for next steps.

## 2022-11-08 ENCOUNTER — OFFICE VISIT (OUTPATIENT)
Dept: GASTROENTEROLOGY | Facility: CLINIC | Age: 32
End: 2022-11-08
Payer: MEDICAID

## 2022-11-08 VITALS
HEIGHT: 66 IN | BODY MASS INDEX: 26.2 KG/M2 | WEIGHT: 163 LBS | SYSTOLIC BLOOD PRESSURE: 149 MMHG | DIASTOLIC BLOOD PRESSURE: 103 MMHG | HEART RATE: 127 BPM

## 2022-11-08 DIAGNOSIS — K59.09 CHRONIC CONSTIPATION: ICD-10-CM

## 2022-11-08 DIAGNOSIS — R10.84 DIFFUSE ABDOMINAL PAIN: ICD-10-CM

## 2022-11-08 DIAGNOSIS — R11.2 NAUSEA AND VOMITING, UNSPECIFIED VOMITING TYPE: Primary | ICD-10-CM

## 2022-11-08 DIAGNOSIS — K21.9 GASTROESOPHAGEAL REFLUX DISEASE, UNSPECIFIED WHETHER ESOPHAGITIS PRESENT: ICD-10-CM

## 2022-11-08 DIAGNOSIS — R14.0 ABDOMINAL BLOATING: ICD-10-CM

## 2022-11-08 PROCEDURE — 3077F SYST BP >= 140 MM HG: CPT | Performed by: INTERNAL MEDICINE

## 2022-11-08 PROCEDURE — 99244 OFF/OP CNSLTJ NEW/EST MOD 40: CPT | Performed by: INTERNAL MEDICINE

## 2022-11-08 PROCEDURE — 3008F BODY MASS INDEX DOCD: CPT | Performed by: INTERNAL MEDICINE

## 2022-11-08 PROCEDURE — 3080F DIAST BP >= 90 MM HG: CPT | Performed by: INTERNAL MEDICINE

## 2022-11-08 RX ORDER — OMEPRAZOLE 40 MG/1
40 CAPSULE, DELAYED RELEASE ORAL 2 TIMES DAILY
Qty: 180 CAPSULE | Refills: 3 | Status: SHIPPED | OUTPATIENT
Start: 2022-11-08

## 2022-11-09 ENCOUNTER — OFFICE VISIT (OUTPATIENT)
Dept: SURGERY | Facility: CLINIC | Age: 32
End: 2022-11-09
Payer: MEDICAID

## 2022-11-09 VITALS — SYSTOLIC BLOOD PRESSURE: 116 MMHG | DIASTOLIC BLOOD PRESSURE: 72 MMHG | HEART RATE: 116 BPM

## 2022-11-09 DIAGNOSIS — N39.0 RECURRENT UTI: Primary | ICD-10-CM

## 2022-11-09 LAB
BILIRUB UR QL: NEGATIVE
CLARITY UR: CLEAR
COLOR UR: YELLOW
GLUCOSE UR-MCNC: NEGATIVE MG/DL
HGB UR QL STRIP.AUTO: NEGATIVE
LEUKOCYTE ESTERASE UR QL STRIP.AUTO: NEGATIVE
NITRITE UR QL STRIP.AUTO: NEGATIVE
PH UR: 5.5 [PH] (ref 5–8)
PROT UR-MCNC: NEGATIVE MG/DL
SP GR UR STRIP: 1.02 (ref 1–1.03)
UROBILINOGEN UR STRIP-ACNC: 0.2

## 2022-11-09 PROCEDURE — 3074F SYST BP LT 130 MM HG: CPT | Performed by: NURSE PRACTITIONER

## 2022-11-09 PROCEDURE — 3078F DIAST BP <80 MM HG: CPT | Performed by: NURSE PRACTITIONER

## 2022-11-09 PROCEDURE — 99244 OFF/OP CNSLTJ NEW/EST MOD 40: CPT | Performed by: NURSE PRACTITIONER

## 2022-11-09 NOTE — TELEPHONE ENCOUNTER
"Requested Prescriptions   Pending Prescriptions Disp Refills     atorvastatin (LIPITOR) 10 MG tablet      Last Written Prescription Date:  2.20.19  Last Fill Quantity: 90 tablet,  # refills: 1   Last office visit: 5/23/2019 with prescribing provider:  Lisbeth Gonzalez MD             Future Office Visit:   Next 5 appointments (look out 90 days)    Sep 18, 2019  8:40 AM CDT  PHYSICAL with Lisbeth Gonzalez MD  [facility] ([facility] [address] [place] [id number] [phone]            90 tablet 1     Sig: Take 1 tablet (10 mg) by mouth daily       Statins Protocol Passed - 7/19/2019  2:35 PM        Passed - LDL on file in past 12 months     Recent Labs   Lab Test 09/17/18  0929   LDL 56             Passed - No abnormal creatine kinase in past 12 months     Recent Labs   Lab Test 03/02/18  1030   CKT 66                Passed - Recent (12 mo) or future (30 days) visit within the authorizing provider's specialty     Patient had office visit in the last 12 months or has a visit in the next 30 days with authorizing provider or within the authorizing provider's specialty.  See \"Patient Info\" tab in inbasket, or \"Choose Columns\" in Meds & Orders section of the refill encounter.              Passed - Medication is active on med list        Passed - Patient is age 18 or older        Passed - No active pregnancy on record        Passed - No positive pregnancy test in past 12 months        " Letter of medical necessity pended for 's review/signature.

## 2022-11-09 NOTE — TELEPHONE ENCOUNTER
MRI SPINE CERVICAL (CPT=72141) Referral # [id number]  Heidy Dominik Number: [id number]    Called patient to inform, Dr. Uriel Yusuf would like to proceed with appeal. Notified the patient, [facility] appeal form was sent via [address] [place] [address] [address]. Explained lines 1-5 will be filled by referral specialist. Patient was advised to sign and date line 6 and return back. Appeal will be filed once signed [place] form is received.

## 2022-11-15 ENCOUNTER — LAB ENCOUNTER (OUTPATIENT)
Dept: LAB | Facility: HOSPITAL | Age: 32
End: 2022-11-15
Attending: PHYSICAL MEDICINE & REHABILITATION
Payer: MEDICAID

## 2022-11-15 DIAGNOSIS — Z01.818 PREOP TESTING: ICD-10-CM

## 2022-11-15 RX ORDER — BUDESONIDE AND FORMOTEROL FUMARATE DIHYDRATE 160; 4.5 UG/1; UG/1
AEROSOL RESPIRATORY (INHALATION)
Qty: 30.6 G | Refills: 0 | Status: SHIPPED | OUTPATIENT
Start: 2022-11-15

## 2022-11-16 LAB — SARS-COV-2 RNA RESP QL NAA+PROBE: NOT DETECTED

## 2022-11-17 RX ORDER — FAMOTIDINE 20 MG/1
20 TABLET, FILM COATED ORAL 2 TIMES DAILY
COMMUNITY

## 2022-11-18 ENCOUNTER — HOSPITAL ENCOUNTER (OUTPATIENT)
Facility: HOSPITAL | Age: 32
Setting detail: HOSPITAL OUTPATIENT SURGERY
Discharge: HOME | End: 2022-11-18
Attending: PHYSICAL MEDICINE & REHABILITATION | Admitting: PHYSICAL MEDICINE & REHABILITATION
Payer: MEDICAID

## 2022-11-18 ENCOUNTER — APPOINTMENT (OUTPATIENT)
Dept: GENERAL RADIOLOGY | Facility: HOSPITAL | Age: 32
End: 2022-11-18
Attending: PHYSICAL MEDICINE & REHABILITATION
Payer: MEDICAID

## 2022-11-18 ENCOUNTER — ANESTHESIA (OUTPATIENT)
Dept: SURGERY | Facility: HOSPITAL | Age: 32
End: 2022-11-18
Payer: MEDICAID

## 2022-11-18 ENCOUNTER — ANESTHESIA EVENT (OUTPATIENT)
Dept: SURGERY | Facility: HOSPITAL | Age: 32
End: 2022-11-18
Payer: MEDICAID

## 2022-11-18 VITALS
TEMPERATURE: 99 F | HEART RATE: 78 BPM | BODY MASS INDEX: 25.11 KG/M2 | DIASTOLIC BLOOD PRESSURE: 64 MMHG | HEIGHT: 67 IN | OXYGEN SATURATION: 100 % | SYSTOLIC BLOOD PRESSURE: 114 MMHG | RESPIRATION RATE: 16 BRPM | WEIGHT: 160 LBS

## 2022-11-18 DIAGNOSIS — Z01.818 PREOP TESTING: Primary | ICD-10-CM

## 2022-11-18 LAB — B-HCG UR QL: NEGATIVE

## 2022-11-18 PROCEDURE — 3E0R33Z INTRODUCTION OF ANTI-INFLAMMATORY INTO SPINAL CANAL, PERCUTANEOUS APPROACH: ICD-10-PCS | Performed by: PHYSICAL MEDICINE & REHABILITATION

## 2022-11-18 PROCEDURE — 3E0R3BZ INTRODUCTION OF ANESTHETIC AGENT INTO SPINAL CANAL, PERCUTANEOUS APPROACH: ICD-10-PCS | Performed by: PHYSICAL MEDICINE & REHABILITATION

## 2022-11-18 PROCEDURE — 64483 NJX AA&/STRD TFRM EPI L/S 1: CPT | Performed by: PHYSICAL MEDICINE & REHABILITATION

## 2022-11-18 RX ORDER — MORPHINE SULFATE 4 MG/ML
2 INJECTION, SOLUTION INTRAMUSCULAR; INTRAVENOUS EVERY 10 MIN PRN
Status: DISCONTINUED | OUTPATIENT
Start: 2022-11-18 | End: 2022-11-18

## 2022-11-18 RX ORDER — PROCHLORPERAZINE EDISYLATE 5 MG/ML
5 INJECTION INTRAMUSCULAR; INTRAVENOUS EVERY 8 HOURS PRN
Status: DISCONTINUED | OUTPATIENT
Start: 2022-11-18 | End: 2022-11-18

## 2022-11-18 RX ORDER — SODIUM CHLORIDE, SODIUM LACTATE, POTASSIUM CHLORIDE, CALCIUM CHLORIDE 600; 310; 30; 20 MG/100ML; MG/100ML; MG/100ML; MG/100ML
INJECTION, SOLUTION INTRAVENOUS CONTINUOUS
Status: DISCONTINUED | OUTPATIENT
Start: 2022-11-18 | End: 2022-11-18

## 2022-11-18 RX ORDER — MORPHINE SULFATE 4 MG/ML
4 INJECTION, SOLUTION INTRAMUSCULAR; INTRAVENOUS EVERY 10 MIN PRN
Status: DISCONTINUED | OUTPATIENT
Start: 2022-11-18 | End: 2022-11-18

## 2022-11-18 RX ORDER — LIDOCAINE HYDROCHLORIDE 10 MG/ML
INJECTION, SOLUTION EPIDURAL; INFILTRATION; INTRACAUDAL; PERINEURAL AS NEEDED
Status: DISCONTINUED | OUTPATIENT
Start: 2022-11-18 | End: 2022-11-18 | Stop reason: HOSPADM

## 2022-11-18 RX ORDER — HYDROMORPHONE HYDROCHLORIDE 1 MG/ML
0.6 INJECTION, SOLUTION INTRAMUSCULAR; INTRAVENOUS; SUBCUTANEOUS EVERY 5 MIN PRN
Status: DISCONTINUED | OUTPATIENT
Start: 2022-11-18 | End: 2022-11-18

## 2022-11-18 RX ORDER — HYDROMORPHONE HYDROCHLORIDE 1 MG/ML
0.2 INJECTION, SOLUTION INTRAMUSCULAR; INTRAVENOUS; SUBCUTANEOUS EVERY 5 MIN PRN
Status: DISCONTINUED | OUTPATIENT
Start: 2022-11-18 | End: 2022-11-18

## 2022-11-18 RX ORDER — NALOXONE HYDROCHLORIDE 0.4 MG/ML
80 INJECTION, SOLUTION INTRAMUSCULAR; INTRAVENOUS; SUBCUTANEOUS AS NEEDED
Status: DISCONTINUED | OUTPATIENT
Start: 2022-11-18 | End: 2022-11-18

## 2022-11-18 RX ORDER — TRIAMCINOLONE ACETONIDE 40 MG/ML
INJECTION, SUSPENSION INTRA-ARTICULAR; INTRAMUSCULAR AS NEEDED
Status: DISCONTINUED | OUTPATIENT
Start: 2022-11-18 | End: 2022-11-18 | Stop reason: HOSPADM

## 2022-11-18 RX ORDER — MORPHINE SULFATE 10 MG/ML
6 INJECTION, SOLUTION INTRAMUSCULAR; INTRAVENOUS EVERY 10 MIN PRN
Status: DISCONTINUED | OUTPATIENT
Start: 2022-11-18 | End: 2022-11-18

## 2022-11-18 RX ORDER — ACETAMINOPHEN 500 MG
1000 TABLET ORAL ONCE
Status: COMPLETED | OUTPATIENT
Start: 2022-11-18 | End: 2022-11-18

## 2022-11-18 RX ORDER — ONDANSETRON 2 MG/ML
4 INJECTION INTRAMUSCULAR; INTRAVENOUS EVERY 6 HOURS PRN
Status: DISCONTINUED | OUTPATIENT
Start: 2022-11-18 | End: 2022-11-18

## 2022-11-18 RX ORDER — HYDROMORPHONE HYDROCHLORIDE 1 MG/ML
0.4 INJECTION, SOLUTION INTRAMUSCULAR; INTRAVENOUS; SUBCUTANEOUS EVERY 5 MIN PRN
Status: DISCONTINUED | OUTPATIENT
Start: 2022-11-18 | End: 2022-11-18

## 2022-11-18 RX ADMIN — SODIUM CHLORIDE, SODIUM LACTATE, POTASSIUM CHLORIDE, CALCIUM CHLORIDE: 600; 310; 30; 20 INJECTION, SOLUTION INTRAVENOUS at 19:41:00

## 2022-11-18 RX ADMIN — SODIUM CHLORIDE, SODIUM LACTATE, POTASSIUM CHLORIDE, CALCIUM CHLORIDE: 600; 310; 30; 20 INJECTION, SOLUTION INTRAVENOUS at 19:53:00

## 2022-11-18 NOTE — DISCHARGE SUMMARY
[facility]OSP [facility]    Discharge Summary    Param Level Patient Status:  Hospital Outpatient Surgery    1990 MRN [id number]   Location [facility] PRE OP RECOVERY Attending Ray Gupta MD   Hosp Day # 0 PCP Elias Myles DO     Date of Admission: 2022 Disposition: Home or Self Care     Date of Discharge:  2022    Admitting Diagnosis: Lumbar foraminal stenosis [M48.061]  Chronic bilateral low back pain with bilateral sciatica [M54.42, M54.41, G89.29]    Discharge Diagnosis: Patient Active Problem List:     Irritable bowel syndrome     Anxiety     ADHD     Depression     Raynaud's disease without gangrene     Vasovagal syndrome     Orthostatic hypotension     Vasovagal syncope     Migraine aura, persistent     Recurrent cold sores     Chronic neck pain     Sciatica of right side     Chronic pain of right wrist     Bipolar II disorder (HCC)     Vaginal discharge     Contraceptive education     Idiopathic urticaria     Breast pain     Chronic low back pain     Dysmenorrhea     L5-S1 right paracentral mild bulging disc with annular tear & right mild foraminal bulging disc, L4-5 mild central bulging disc     right > left S1 radiculopathy     Rash and nonspecific skin eruption     Sebaceous cyst     Bilateral hand numbness with normal EMG/NCS     Incontinence of feces with fecal urgency     Urge incontinence of urine     L5-S1 right mild foraminal stenosis     Chronic idiopathic urticaria     C4-5 slight-mild central, C6-7 mild diffuse bulging discs     C5-6 mild central herniated disc     right C6 radiculopathy      Procedures: bilateral S1 TFESI's    Complications: none    Discharge Condition: Stable    Discharge Medications:      Discharge Medications      ASK your doctor about these medications      Instructions Prescription details   albuterol (2.5 MG/3ML) 0.083% Nebu  Commonly known as: Ventolin      Inhale 3 mL (2.5 mg) by nebulization - Unspecified route, every 4-6 hours PRNs   Quantity: 30 each  Refills: 0     albuterol 108 (90 Base) MCG/ACT Aers  Commonly known as: Ventolin HFA      Inhale 2 puffs into the lungs every 6 (six) hours as needed for Wheezing or Shortness of Breath. Quantity: 3 each  Refills: 1     amphetamine-dextroamphetamine ER 10 MG Cp24  Commonly known as: Adderall XR      Take 1 capsule (10 mg total) by mouth every morning. Quantity: 30 capsule  Refills: 0     Ashwagandha 500 MG Caps      Take 500 capsules by mouth daily with breakfast.   Refills: 0     Evening Primrose Oil 1000 MG Caps      Take 1,300 capsules by mouth daily. Refills: 0     famotidine 20 MG Tabs  Commonly known as: Pepcid      Take 20 mg by mouth 2 (two) times daily. Refills: 0     HYDROcodone-acetaminophen 5-325 MG Tabs  Commonly known as: Norco      Take 1 tablet by mouth every 8 (eight) hours as needed for Pain. Quantity: 30 tablet  Refills: 0     Levonorgest-Eth Estrad 91-Day 0.15-0.03 &0.01 MG Tabs  Commonly known as: Simpesse      Take 1 tablet by mouth daily. Quantity: 91 tablet  Refills: 3     lidocaine 5 % Ptch  Commonly known as: Lidoderm      Place 1 patch onto the skin daily as needed. Quantity: 30 patch  Refills: 0     Multi-Day Tabs      Take 1 each by mouth daily. Refills: 0     Omeprazole 40 MG Cpdr      Take 1 capsule (40 mg total) by mouth in the morning and 1 capsule (40 mg total) before bedtime. Quantity: 180 capsule  Refills: 3     ondansetron 4 MG Tbdp  Commonly known as: Zofran-ODT      Take 1 tablet (4 mg total) by mouth every 8 (eight) hours as needed for Nausea. Quantity: 20 tablet  Refills: 0     Symbicort 160-4.5 MCG/ACT Aero  Generic drug: Budesonide-Formoterol Fumarate      INHALE 2 PUFFS INTO THE LUNGS TWICE DAILY   Quantity: 30.6 g  Refills: 0     tiZANidine 4 MG Tabs  Commonly known as: Zanaflex      Take 2 tablets (8 mg total) by mouth 3 (three) times daily as needed (muscle spasm).    Quantity: 180 tablet  Refills: 0     valACYclovir 1 G Tabs  Commonly known as: Valtrex      TAKE 2 TABLETS BY MOUTH TWICE DAILY FOR 2 DOSES FOR ACUTE FLARES   Quantity: 28 tablet  Refills: 2     Xolair 150 MG/ML Sosy  Generic drug: Omalizumab      Inject 300 mg into the skin every 28 days. Quantity: 2 mL  Refills: 5     ZyrTEC Allergy 10 MG Tbdp  Generic drug: Cetirizine HCl      Take 1 tablet by mouth daily. Refills: 0            Follow up Visits: Follow-up with Dr. Lenka Luis in 10 days with a phone call and in clinic as scheduled. Other Discharge Instructions: ice 20 minutes on and 20 minutes off for 2 hours and then as needed for the pain. Remove dressing in 24 hours. Martha Em MD  11/18/2022  4:59 PM

## 2022-11-18 NOTE — OPERATIVE REPORT
[facility] Operative Report    BILATERAL S1 TFESI   NAME:  Nancy Hendrickson    MR #:    [id number] :  1990     PHYSICIAN:  Jeffery Em MD        Operative Report    DATE OF PROCEDURE: 2022   PREOPERATIVE DIAGNOSES: Problem   right > left S1 radiculopathy   L5-S1 right paracentral mild bulging disc with annular tear & right mild foraminal bulging disc, L4-5 mild central bulging disc       POSTOPERATIVE DIAGNOSES:   same    PROCEDURES: Bilateral S1 transforaminal epidural steroid injections done under fluoroscopic guidance with contrast enhancement. SURGEON: Jeffery Em MD   ANESTHESIA: MAC   INDICATIONS:      OPERATIVE PROCEDURE:  Written consent was obtained from the patient. The patient was brought into the operating room and placed in the prone position on the fluoroscopy table with pillow underneath her abdomen. The patient's skin was cleaned and draped in a normal sterile fashion. Using AP fluoroscopy, all five lumbar vertebrae were identified. Then the bilateral first pedicles were identified with the bilateral first sacral foramen inferior to them. Then, 3.5 inch inch, 22-gauge spinal needles were inserted and directed towards the bilateral first sacral foramen. When they were felt to be in good position, Omnipaque-240 contrast was used to obtain a good epidurograms indicating correct needle placement. Then, aspiration was performed. No blood, fluid, or air was aspirated. Then, the patient was injected with a 3 cc solution of 1.5 cc of 40 mg/cc of Kenalog and 1.5 cc of 1% PF lidocaine without epinephrine on each side. After this, the needles were removed. The patient's skin was cleaned. Band-Aids were applied. The patient was transferred to the cart and into [facility]. The patient was given discharge instructions and will follow up in the clinic as scheduled.   Throughout the whole procedure, the patient's pulse oximetry and vital signs were monitored and they remained completely stable. Also, throughout the whole procedure, prior to injection of any medication, aspiration was performed. No blood, fluid, or air was aspirated at anytime.

## 2022-11-21 RX ORDER — LIDOCAINE 50 MG/G
1 PATCH TOPICAL DAILY PRN
Qty: 30 PATCH | Refills: 0 | Status: SHIPPED | OUTPATIENT
Start: 2022-11-21

## 2022-11-21 NOTE — TELEPHONE ENCOUNTER
Refill passed per [address] protocol. Requested Prescriptions   Pending Prescriptions Disp Refills    lidocaine 5 % External Patch 30 patch 0     Sig: Place 1 patch onto the skin daily as needed.        Non-Narcotic Pain Medication Protocol Passed - 11/21/2022  9:35 AM        Passed - In person appointment or virtual visit in the past 6 mos or appointment in next 3 mos     Recent Outpatient Visits              1 week ago Recurrent UTI    [facility], [address], [employer], [address], [employer] Visit    1 week ago Nausea and vomiting, unspecified vomiting type    [address], [address], [address] Jasmina Hart MD    Office Visit    2 weeks ago Chronic idiopathic urticaria    [address] Visit    2 weeks ago Chronic idiopathic urticaria    [address], [employer] MD    Virtual Phone E/M    2 weeks ago C4-5 slight-mild central, C6-7 mild diffuse bulging discs    [facility] Jim Jones MD    Office Visit          Future Appointments         Provider Department Appt Notes    In 2 days Florina Lester MD [facility] Surgery Surgery follow up    In 2 days Jossie Em MD [address] f/u; PC/UDS needed    In 2 weeks EM CC [address]  [facility] until 2-17-23*    In 2 months [facility], PROCEDURE [address] GI PROCEDURE EGD @ [facility]                    Recent Outpatient Visits              1 week ago Recurrent UTI    [facility], [address], [employer], [address], APRN    Office Visit    1 week ago Nausea and vomiting, unspecified vomiting type    [address], [address], [address] Jasmina Hart MD    Office Visit    2 weeks ago Chronic idiopathic urticaria    [address] [address] Office Visit    2 weeks ago Chronic idiopathic urticaria    [address] [facility], [facility], Loren Solis MD    Virtual Phone E/M    2 weeks ago C4-5 slight-mild central, C6-7 mild diffuse bulging discs    [address] [facility] Filiberto Arauz MD    Office Visit            Future Appointments         Provider Department Appt Notes    In 2 days Jerry Rudd MD [facility] Surgery Surgery follow up    In 2 days Yesi Em MD [address] [place] [address]-[facility] f/u; PC/UDS needed    In 2 weeks EM CC [address]  [facility] until 2-17-23*    In 2 months [facility], PROCEDURE [address] GI PROCEDURE EGD @ [address] [place] [address]

## 2022-11-23 ENCOUNTER — LABORATORY ENCOUNTER (OUTPATIENT)
Dept: LAB | Age: 32
End: 2022-11-23
Attending: PHYSICAL MEDICINE & REHABILITATION
Payer: MEDICAID

## 2022-11-23 ENCOUNTER — OFFICE VISIT (OUTPATIENT)
Dept: SURGERY | Facility: CLINIC | Age: 32
End: 2022-11-23
Payer: MEDICAID

## 2022-11-23 ENCOUNTER — OFFICE VISIT (OUTPATIENT)
Dept: PHYSICAL MEDICINE AND REHAB | Facility: CLINIC | Age: 32
End: 2022-11-23
Payer: MEDICAID

## 2022-11-23 ENCOUNTER — HOSPITAL ENCOUNTER (OUTPATIENT)
Age: 32
Discharge: HOME | End: 2022-11-23
Payer: MEDICAID

## 2022-11-23 VITALS
RESPIRATION RATE: 18 BRPM | OXYGEN SATURATION: 98 % | SYSTOLIC BLOOD PRESSURE: 130 MMHG | DIASTOLIC BLOOD PRESSURE: 87 MMHG | TEMPERATURE: 97 F | HEART RATE: 112 BPM

## 2022-11-23 VITALS
DIASTOLIC BLOOD PRESSURE: 80 MMHG | WEIGHT: 160 LBS | SYSTOLIC BLOOD PRESSURE: 122 MMHG | OXYGEN SATURATION: 98 % | HEART RATE: 115 BPM | BODY MASS INDEX: 25 KG/M2

## 2022-11-23 DIAGNOSIS — M48.061 SPINAL STENOSIS, LUMBAR REGION, WITHOUT NEUROGENIC CLAUDICATION: ICD-10-CM

## 2022-11-23 DIAGNOSIS — M54.12 CERVICAL RADICULOPATHY: ICD-10-CM

## 2022-11-23 DIAGNOSIS — M54.16 LUMBAR RADICULOPATHY: ICD-10-CM

## 2022-11-23 DIAGNOSIS — F31.81 BIPOLAR II DISORDER (HCC): ICD-10-CM

## 2022-11-23 DIAGNOSIS — J01.00 ACUTE NON-RECURRENT MAXILLARY SINUSITIS: Primary | ICD-10-CM

## 2022-11-23 DIAGNOSIS — R20.0 BILATERAL HAND NUMBNESS: ICD-10-CM

## 2022-11-23 DIAGNOSIS — M50.20 CERVICAL HERNIATED DISC: ICD-10-CM

## 2022-11-23 DIAGNOSIS — M51.9 LUMBAR DISC DISEASE: Primary | ICD-10-CM

## 2022-11-23 DIAGNOSIS — M50.90 CERVICAL DISC DISEASE: ICD-10-CM

## 2022-11-23 DIAGNOSIS — M48.061 LUMBAR FORAMINAL STENOSIS: ICD-10-CM

## 2022-11-23 DIAGNOSIS — M54.12 BRACHIAL NEURITIS: Primary | ICD-10-CM

## 2022-11-23 DIAGNOSIS — L72.0 EPIDERMAL INCLUSION CYST: Primary | ICD-10-CM

## 2022-11-23 LAB
AMPHET UR QL SCN: NEGATIVE
BENZODIAZ UR QL SCN: NEGATIVE
COCAINE UR QL: NEGATIVE
CREAT UR-SCNC: 185 MG/DL
MDMA UR QL SCN: NEGATIVE
OXYCODONE UR QL SCN: NEGATIVE
POCT INFLUENZA A: NEGATIVE
POCT INFLUENZA B: NEGATIVE
SARS-COV-2 RNA RESP QL NAA+PROBE: NOT DETECTED

## 2022-11-23 PROCEDURE — 99024 POSTOP FOLLOW-UP VISIT: CPT | Performed by: SURGERY

## 2022-11-23 PROCEDURE — 87502 INFLUENZA DNA AMP PROBE: CPT | Performed by: NURSE PRACTITIONER

## 2022-11-23 PROCEDURE — 80349 CANNABINOIDS NATURAL: CPT

## 2022-11-23 PROCEDURE — 99214 OFFICE O/P EST MOD 30 MIN: CPT | Performed by: PHYSICAL MEDICINE & REHABILITATION

## 2022-11-23 PROCEDURE — 3079F DIAST BP 80-89 MM HG: CPT | Performed by: PHYSICAL MEDICINE & REHABILITATION

## 2022-11-23 PROCEDURE — 80361 OPIATES 1 OR MORE: CPT

## 2022-11-23 PROCEDURE — 3074F SYST BP LT 130 MM HG: CPT | Performed by: PHYSICAL MEDICINE & REHABILITATION

## 2022-11-23 PROCEDURE — 99213 OFFICE O/P EST LOW 20 MIN: CPT | Performed by: NURSE PRACTITIONER

## 2022-11-23 PROCEDURE — 80307 DRUG TEST PRSMV CHEM ANLYZR: CPT

## 2022-11-23 PROCEDURE — U0002 COVID-19 LAB TEST NON-CDC: HCPCS | Performed by: NURSE PRACTITIONER

## 2022-11-23 RX ORDER — HYDROCODONE BITARTRATE AND ACETAMINOPHEN 5; 325 MG/1; MG/1
1 TABLET ORAL EVERY 8 HOURS PRN
Qty: 30 TABLET | Refills: 0 | Status: SHIPPED | OUTPATIENT
Start: 2022-11-23

## 2022-11-23 RX ORDER — AMOXICILLIN AND CLAVULANATE POTASSIUM 875; 125 MG/1; MG/1
1 TABLET, FILM COATED ORAL 2 TIMES DAILY
Qty: 14 TABLET | Refills: 0 | Status: SHIPPED | OUTPATIENT
Start: 2022-11-23 | End: 2022-11-30

## 2022-11-23 NOTE — PROGRESS NOTES
Postoperative Patient Follow-up      11/23/2022    Thor Flaherty 27year old      HPI  Patient presents with:  Post-Op: Pt here for post op s/p exc. Bx of left groin skin cyst on 11/2/2022. Pt denies fever or pain to area. Sl drainage noted some chronic low back issues - sees Dr. Uriel Yusuf. Exam  L groin crease incision is generally intact, sl separation superiorly with scant clear drainage, nt, no cellulitis. Assessment/Plan  Assessment   Epidermal inclusion cyst  (primary encounter diagnosis)    Steady progress following excision of a L groin skin cyst.  Dry gauze and protect incision for another 1-2 weeks, routine derm surveillance and medical and specialty f/u.          Jairo Vaz MD

## 2022-11-23 NOTE — PATIENT INSTRUCTIONS
Plan  I will perform a right C7-T1 ILESI under MAC. She was informed of the risks of dural puncture headache and spinal cord injury. She will get back into the PT for the cervical and lumbar spines. She will continue with the Nroco for the pain as needed. The patient will continue with her home exercise program and cervical traction. The patient will follow up in 2-3 months, but the patient will call me 2 weeks after having the injection to let me know how the injection worked.

## 2022-11-24 ENCOUNTER — HOSPITAL ENCOUNTER (EMERGENCY)
Facility: HOSPITAL | Age: 32
Discharge: HOME | End: 2022-11-24
Payer: MEDICAID

## 2022-11-24 VITALS
RESPIRATION RATE: 20 BRPM | DIASTOLIC BLOOD PRESSURE: 85 MMHG | OXYGEN SATURATION: 98 % | SYSTOLIC BLOOD PRESSURE: 137 MMHG | HEART RATE: 82 BPM | TEMPERATURE: 97 F

## 2022-11-24 DIAGNOSIS — Z51.89 ENCOUNTER FOR WOUND CARE: Primary | ICD-10-CM

## 2022-11-24 PROCEDURE — 99282 EMERGENCY DEPT VISIT SF MDM: CPT

## 2022-11-25 ENCOUNTER — PATIENT MESSAGE (OUTPATIENT)
Dept: PHYSICAL MEDICINE AND REHAB | Facility: CLINIC | Age: 32
End: 2022-11-25

## 2022-11-25 PROBLEM — F12.90 MARIJUANA USE: Status: ACTIVE | Noted: 2022-11-25

## 2022-11-25 NOTE — ED QUICK NOTES
Pt provided with discharge instructions. Verbalized understanding understanding of plan of care at home and follow up. All questions and concerns addressed prior to discharge.

## 2022-11-25 NOTE — ED QUICK NOTES
Patient is alert and oriented x4. Showing no signs or symptoms of any respiratory distress. Ambulatory in the room. Post op incision to the left groin was cleaned with saline, telfa placed. Comfort measures in place.

## 2022-11-27 LAB
DRUG CONFIRMATION,CANNABINOIDS: >500 NG/ML
OPIATES, UR, 6-ACETYLMORPHINE: <10 NG/ML
OPIATES, URINE, CODEINE: <20 NG/ML
OPIATES, URINE, HYDROCODONE: 193 NG/ML
OPIATES, URINE, HYDROMORPHONE: <20 NG/ML
OPIATES, URINE, MORPHINE: <20 NG/ML
OPIATES, URINE, NORHYDROCODONE: 261 NG/ML
OPIATES, URINE, NOROXYCODONE: <20 NG/ML
OPIATES, URINE, NOROXYMORPHONE: <20 NG/ML
OPIATES, URINE, OXYCODONE: <20 NG/ML
OPIATES, URINE, OXYMORPHONE: <20 NG/ML

## 2022-11-28 ENCOUNTER — TELEPHONE (OUTPATIENT)
Dept: NEUROLOGY | Facility: CLINIC | Age: 32
End: 2022-11-28

## 2022-11-28 DIAGNOSIS — M54.2 CHRONIC NECK PAIN: Primary | ICD-10-CM

## 2022-11-28 DIAGNOSIS — G89.29 CHRONIC NECK PAIN: Primary | ICD-10-CM

## 2022-11-28 NOTE — TELEPHONE ENCOUNTER
Per patient message: \"I use high cbd low thc Judah Arndt oil (RSO) for inflammation. About 10-25 mcg a day. \"    Above forwarded to [address]

## 2022-11-28 NOTE — TELEPHONE ENCOUNTER
----- Message from Windell Kawasaki, MD sent at 11/25/2022  5:24 AM CST -----  Please find out how much marijuana she is using and how often. This can interact with her other medications.

## 2022-11-29 NOTE — TELEPHONE ENCOUNTER
Right C7-T1 ILESI  CPT code: 23146    STATUS: Authorized    [employer], request above is authorized. Authorization # [id number] effective date: 11/29/22-01/28/2023  Notified nursing for scheduling. FYI: Per CMS Guidelines: One to two levels, either unilateral or bilateral, are allowed per session per spine region. The need for a three or four-level procedure bilaterally may be considered under unique circumstances and with sufficient documentation of medical necessity on appeal. A session is a time period, which includes all procedures (i.e., medial branch block (MBB), intraarticular injections (IA), facet cyst ruptures, and RFA ablations that are performed during the same day.

## 2022-11-29 NOTE — TELEPHONE ENCOUNTER
Right C7-T1 ILESI  CPT code: 70786  Status: pending approval     [employer], to initiate authorization for request.   Case# [id number]     Clinical notes sent to [facility] for clinical review.

## 2022-11-30 NOTE — TELEPHONE ENCOUNTER
Patient has been scheduled for Right C7-T1 ILESI  on 1/13/23 at the [facility]  with Emely Nelsonrosa. -Anesthesia type: MAC.  -Patient informed to fast 12 hours prior to procedure with IVCS/MAC.   -Scheduling [facility] covid testing required for all procedures whether patient is vaccinated or not. -Patient was advised that if he/she does receive the covid vaccine it needs to be at least 2 weeks before or after the injection. -Medications and allergies reviewed. -Patient reminded to hold NSAIDs (Ibuprofen, ASA 81, Aleve, Naproxen, Mobic, Diclofenac, Etodolac, Celebrex etc.) for 3 days prior to Lumbar MBB/Facet if BMI is greater than 35. For Cervical injections only hold multivitamins, Vitamin E, Fish Oil, Phentermine/Lomaira for 7 days prior to injection and NSAIDS.  mg to be held for 7 days prior to injections.  -If patient is receiving MAC/IVCS Phentermine Yulisa Lapine) will need to be held for 7 days prior to injection.  -If on blood thinner clearance has been received to hold this medication by provider.   -Patient informed he/she will need a  to and from procedure. Vivek Barrett is located in the [facility] [address] 1st floor,  may park in the yellow/purple parking lot. Patient verbalized understanding and agrees with plan.  -----> Scheduled in Epic: Yes  -----> Scheduled in [employer]: No surgical case sent.

## 2022-11-30 NOTE — TELEPHONE ENCOUNTER
MRI SPINE CERVICAL (CPT=72141)    Status: Camacho Escobar for appeal status, spoke to Nancy Mae who states appeal is overturned.     Request is authorized under auth # [id number] valid 11/18/22-01/02/2023    Reference call# [id number]    Notified patient via [facility]

## 2022-12-01 ENCOUNTER — HOSPITAL ENCOUNTER (EMERGENCY)
Facility: HOSPITAL | Age: 32
Discharge: HOME | End: 2022-12-01
Attending: EMERGENCY MEDICINE
Payer: MEDICAID

## 2022-12-01 ENCOUNTER — APPOINTMENT (OUTPATIENT)
Dept: CT IMAGING | Facility: HOSPITAL | Age: 32
End: 2022-12-01
Attending: EMERGENCY MEDICINE
Payer: MEDICAID

## 2022-12-01 VITALS
RESPIRATION RATE: 18 BRPM | TEMPERATURE: 99 F | SYSTOLIC BLOOD PRESSURE: 128 MMHG | DIASTOLIC BLOOD PRESSURE: 77 MMHG | HEART RATE: 90 BPM | OXYGEN SATURATION: 98 %

## 2022-12-01 DIAGNOSIS — N30.90 CYSTITIS: Primary | ICD-10-CM

## 2022-12-01 DIAGNOSIS — M54.59 INTRACTABLE LOW BACK PAIN: ICD-10-CM

## 2022-12-01 LAB
ALBUMIN SERPL-MCNC: 3.5 G/DL (ref 3.4–5)
ALBUMIN/GLOB SERPL: 0.9 {RATIO} (ref 1–2)
ALP LIVER SERPL-CCNC: 56 U/L
ALT SERPL-CCNC: 34 U/L
ANION GAP SERPL CALC-SCNC: 2 MMOL/L (ref 0–18)
AST SERPL-CCNC: 15 U/L (ref 15–37)
B-HCG UR QL: NEGATIVE
BASOPHILS # BLD AUTO: 0.05 X10(3) UL (ref 0–0.2)
BASOPHILS NFR BLD AUTO: 0.4 %
BILIRUB SERPL-MCNC: 0.2 MG/DL (ref 0.1–2)
BILIRUB UR QL: NEGATIVE
BUN BLD-MCNC: 9 MG/DL (ref 7–18)
BUN/CREAT SERPL: 14.1 (ref 10–20)
CALCIUM BLD-MCNC: 8.7 MG/DL (ref 8.5–10.1)
CHLORIDE SERPL-SCNC: 110 MMOL/L (ref 98–112)
CLARITY UR: CLEAR
CO2 SERPL-SCNC: 28 MMOL/L (ref 21–32)
COLOR UR: YELLOW
CREAT BLD-MCNC: 0.64 MG/DL
DEPRECATED RDW RBC AUTO: 46.5 FL (ref 35.1–46.3)
EOSINOPHIL # BLD AUTO: 0.08 X10(3) UL (ref 0–0.7)
EOSINOPHIL NFR BLD AUTO: 0.6 %
ERYTHROCYTE [DISTWIDTH] IN BLOOD BY AUTOMATED COUNT: 14.4 % (ref 11–15)
GFR SERPLBLD BASED ON 1.73 SQ M-ARVRAT: 119 ML/MIN/1.73M2 (ref 60–?)
GLOBULIN PLAS-MCNC: 3.7 G/DL (ref 2.8–4.4)
GLUCOSE BLD-MCNC: 86 MG/DL (ref 70–99)
GLUCOSE UR-MCNC: NEGATIVE MG/DL
HCT VFR BLD AUTO: 39.7 %
HGB BLD-MCNC: 12.8 G/DL
HGB UR QL STRIP.AUTO: NEGATIVE
IMM GRANULOCYTES # BLD AUTO: 0.04 X10(3) UL (ref 0–1)
IMM GRANULOCYTES NFR BLD: 0.3 %
KETONES UR-MCNC: NEGATIVE MG/DL
LEUKOCYTE ESTERASE UR QL STRIP.AUTO: NEGATIVE
LYMPHOCYTES # BLD AUTO: 3.39 X10(3) UL (ref 1–4)
LYMPHOCYTES NFR BLD AUTO: 24.3 %
MCH RBC QN AUTO: 28.8 PG (ref 26–34)
MCHC RBC AUTO-ENTMCNC: 32.2 G/DL (ref 31–37)
MCV RBC AUTO: 89.4 FL
MONOCYTES # BLD AUTO: 1.21 X10(3) UL (ref 0.1–1)
MONOCYTES NFR BLD AUTO: 8.7 %
NEUTROPHILS # BLD AUTO: 9.17 X10 (3) UL (ref 1.5–7.7)
NEUTROPHILS # BLD AUTO: 9.17 X10(3) UL (ref 1.5–7.7)
NEUTROPHILS NFR BLD AUTO: 65.7 %
NITRITE UR QL STRIP.AUTO: NEGATIVE
OSMOLALITY SERPL CALC.SUM OF ELEC: 288 MOSM/KG (ref 275–295)
PH UR: 7 [PH] (ref 5–8)
PLATELET # BLD AUTO: 469 10(3)UL (ref 150–450)
POTASSIUM SERPL-SCNC: 4 MMOL/L (ref 3.5–5.1)
PROT SERPL-MCNC: 7.2 G/DL (ref 6.4–8.2)
PROT UR-MCNC: NEGATIVE MG/DL
RBC # BLD AUTO: 4.44 X10(6)UL
SODIUM SERPL-SCNC: 140 MMOL/L (ref 136–145)
SP GR UR STRIP: 1.02 (ref 1–1.03)
UROBILINOGEN UR STRIP-ACNC: 0.2
WBC # BLD AUTO: 13.9 X10(3) UL (ref 4–11)

## 2022-12-01 PROCEDURE — 81003 URINALYSIS AUTO W/O SCOPE: CPT | Performed by: EMERGENCY MEDICINE

## 2022-12-01 PROCEDURE — 85025 COMPLETE CBC W/AUTO DIFF WBC: CPT | Performed by: EMERGENCY MEDICINE

## 2022-12-01 PROCEDURE — 81025 URINE PREGNANCY TEST: CPT

## 2022-12-01 PROCEDURE — 96374 THER/PROPH/DIAG INJ IV PUSH: CPT

## 2022-12-01 PROCEDURE — 99284 EMERGENCY DEPT VISIT MOD MDM: CPT

## 2022-12-01 PROCEDURE — 74176 CT ABD & PELVIS W/O CONTRAST: CPT | Performed by: EMERGENCY MEDICINE

## 2022-12-01 PROCEDURE — 80053 COMPREHEN METABOLIC PANEL: CPT | Performed by: EMERGENCY MEDICINE

## 2022-12-01 RX ORDER — MORPHINE SULFATE 4 MG/ML
4 INJECTION, SOLUTION INTRAMUSCULAR; INTRAVENOUS ONCE
Status: COMPLETED | OUTPATIENT
Start: 2022-12-01 | End: 2022-12-01

## 2022-12-01 RX ORDER — TRAMADOL HYDROCHLORIDE 50 MG/1
TABLET ORAL EVERY 6 HOURS PRN
Qty: 10 TABLET | Refills: 0 | Status: SHIPPED | OUTPATIENT
Start: 2022-12-01 | End: 2022-12-12

## 2022-12-01 RX ORDER — CEPHALEXIN 500 MG/1
500 CAPSULE ORAL 2 TIMES DAILY
Qty: 10 CAPSULE | Refills: 0 | Status: SHIPPED | OUTPATIENT
Start: 2022-12-01 | End: 2022-12-12

## 2022-12-01 NOTE — ED INITIAL ASSESSMENT (HPI)
S: Patient presents to ED with c/o back pain that started yesterday, pt took at nap and woke up and the pain was excruciatingly worse. Pt also has urinary symptoms that include but are not limited to malodorous urine and inability to empty her bladder. Patient is currently taking amoxicillin for a sinus infection. B: Hx of kidney stones, bulging disc, gastroparesis.

## 2022-12-02 ENCOUNTER — TELEMEDICINE (OUTPATIENT)
Dept: FAMILY MEDICINE CLINIC | Facility: CLINIC | Age: 32
End: 2022-12-02
Payer: MEDICAID

## 2022-12-02 DIAGNOSIS — G89.29 CHRONIC BILATERAL LOW BACK PAIN WITHOUT SCIATICA: ICD-10-CM

## 2022-12-02 DIAGNOSIS — R11.2 NAUSEA AND VOMITING, UNSPECIFIED VOMITING TYPE: ICD-10-CM

## 2022-12-02 DIAGNOSIS — M54.50 CHRONIC BILATERAL LOW BACK PAIN WITHOUT SCIATICA: ICD-10-CM

## 2022-12-02 DIAGNOSIS — F41.9 ANXIETY: ICD-10-CM

## 2022-12-02 DIAGNOSIS — R10.9 ABDOMINAL PAIN, UNSPECIFIED ABDOMINAL LOCATION: ICD-10-CM

## 2022-12-02 DIAGNOSIS — R30.0 DYSURIA: Primary | ICD-10-CM

## 2022-12-02 PROCEDURE — 99215 OFFICE O/P EST HI 40 MIN: CPT | Performed by: FAMILY MEDICINE

## 2022-12-05 ENCOUNTER — PATIENT OUTREACH (OUTPATIENT)
Dept: CASE MANAGEMENT | Age: 32
End: 2022-12-05

## 2022-12-05 ENCOUNTER — OFFICE VISIT (OUTPATIENT)
Dept: HEMATOLOGY/ONCOLOGY | Facility: HOSPITAL | Age: 32
End: 2022-12-05
Attending: Other
Payer: MEDICAID

## 2022-12-05 VITALS
DIASTOLIC BLOOD PRESSURE: 89 MMHG | SYSTOLIC BLOOD PRESSURE: 146 MMHG | HEART RATE: 93 BPM | OXYGEN SATURATION: 100 % | RESPIRATION RATE: 20 BRPM | TEMPERATURE: 98 F

## 2022-12-05 DIAGNOSIS — L50.1 IDIOPATHIC URTICARIA: ICD-10-CM

## 2022-12-05 DIAGNOSIS — L50.1 CHRONIC IDIOPATHIC URTICARIA: Primary | ICD-10-CM

## 2022-12-05 PROCEDURE — 96372 THER/PROPH/DIAG INJ SC/IM: CPT

## 2022-12-05 NOTE — PROGRESS NOTES
Presents for [employer], ordered every 4 weeks for idiopathic urticaria. Administered with no s/s of adverse reaction-discharged stable after 30 minute observation. Has future appointments. Has urologist consult in 2 weeks for chronic urology symptoms of intermittent lower back pain and urinary urgency at times.

## 2022-12-05 NOTE — PROGRESS NOTES
Dr Mammie Aase  Internal Medicine  [address]  Apt: Dec 9 @10:15am     Dr. Roly Sepulveda  Urology  [address] [facility] [address]   [phone]  Dec 21 @ 9am     Confirmed w/ pt  Closing encounter

## 2022-12-05 NOTE — PROGRESS NOTES
VM received; pt requesting assistance w/scheduling apt (dc 12/01)    Dr Hernandez Board  Internal Medicine  [address] [place] [id number] [phone]

## 2022-12-08 ENCOUNTER — HOSPITAL ENCOUNTER (OUTPATIENT)
Dept: MRI IMAGING | Age: 32
Discharge: HOME | End: 2022-12-08
Attending: PHYSICAL MEDICINE & REHABILITATION
Payer: MEDICAID

## 2022-12-08 ENCOUNTER — OFFICE VISIT (OUTPATIENT)
Dept: FAMILY MEDICINE CLINIC | Facility: CLINIC | Age: 32
End: 2022-12-08
Payer: MEDICAID

## 2022-12-08 ENCOUNTER — TELEPHONE (OUTPATIENT)
Dept: GASTROENTEROLOGY | Facility: CLINIC | Age: 32
End: 2022-12-08

## 2022-12-08 VITALS
DIASTOLIC BLOOD PRESSURE: 74 MMHG | RESPIRATION RATE: 20 BRPM | HEART RATE: 102 BPM | OXYGEN SATURATION: 99 % | BODY MASS INDEX: 24.28 KG/M2 | WEIGHT: 154.69 LBS | SYSTOLIC BLOOD PRESSURE: 138 MMHG | HEIGHT: 66.75 IN | TEMPERATURE: 99 F

## 2022-12-08 DIAGNOSIS — R31.0 GROSS HEMATURIA: Primary | ICD-10-CM

## 2022-12-08 DIAGNOSIS — R20.0 BILATERAL HAND NUMBNESS: ICD-10-CM

## 2022-12-08 DIAGNOSIS — M50.20 CERVICAL HERNIATED DISC: ICD-10-CM

## 2022-12-08 DIAGNOSIS — R11.2 NAUSEA AND VOMITING, UNSPECIFIED VOMITING TYPE: ICD-10-CM

## 2022-12-08 DIAGNOSIS — K21.9 CHRONIC GERD: Primary | ICD-10-CM

## 2022-12-08 PROBLEM — F43.10 PTSD (POST-TRAUMATIC STRESS DISORDER): Status: ACTIVE | Noted: 2018-12-20

## 2022-12-08 PROBLEM — F90.1 ATTENTION DEFICIT HYPERACTIVITY DISORDER (ADHD), PREDOMINANTLY HYPERACTIVE TYPE: Status: ACTIVE | Noted: 2018-07-23

## 2022-12-08 LAB
APPEARANCE: CLEAR
BILIRUBIN: NEGATIVE
GLUCOSE (URINE DIPSTICK): NEGATIVE MG/DL
KETONES (URINE DIPSTICK): 40 MG/DL
LEUKOCYTES: NEGATIVE
MULTISTIX LOT#: ABNORMAL NUMERIC
NITRITE, URINE: NEGATIVE
OCCULT BLOOD: NEGATIVE
PH, URINE: 5.5 (ref 4.5–8)
SPECIFIC GRAVITY: >1.03 (ref 1–1.03)
URINE-COLOR: YELLOW
UROBILINOGEN,SEMI-QN: 0.2 MG/DL (ref 0–1.9)

## 2022-12-08 PROCEDURE — 3075F SYST BP GE 130 - 139MM HG: CPT | Performed by: PHYSICIAN ASSISTANT

## 2022-12-08 PROCEDURE — 87186 SC STD MICRODIL/AGAR DIL: CPT | Performed by: PHYSICIAN ASSISTANT

## 2022-12-08 PROCEDURE — 99213 OFFICE O/P EST LOW 20 MIN: CPT | Performed by: PHYSICIAN ASSISTANT

## 2022-12-08 PROCEDURE — 3078F DIAST BP <80 MM HG: CPT | Performed by: PHYSICIAN ASSISTANT

## 2022-12-08 PROCEDURE — 87086 URINE CULTURE/COLONY COUNT: CPT | Performed by: PHYSICIAN ASSISTANT

## 2022-12-08 PROCEDURE — 87077 CULTURE AEROBIC IDENTIFY: CPT | Performed by: PHYSICIAN ASSISTANT

## 2022-12-08 PROCEDURE — 3008F BODY MASS INDEX DOCD: CPT | Performed by: PHYSICIAN ASSISTANT

## 2022-12-08 PROCEDURE — 72141 MRI NECK SPINE W/O DYE: CPT | Performed by: PHYSICAL MEDICINE & REHABILITATION

## 2022-12-08 PROCEDURE — 81003 URINALYSIS AUTO W/O SCOPE: CPT | Performed by: PHYSICIAN ASSISTANT

## 2022-12-08 RX ORDER — CYCLOBENZAPRINE HCL 5 MG
TABLET ORAL
COMMUNITY
Start: 2022-11-17 | End: 2022-12-12

## 2022-12-08 RX ORDER — CIPROFLOXACIN 500 MG/1
500 TABLET, FILM COATED ORAL 2 TIMES DAILY
Qty: 10 TABLET | Refills: 0 | Status: SHIPPED | OUTPATIENT
Start: 2022-12-08 | End: 2022-12-10 | Stop reason: ALTCHOICE

## 2022-12-08 NOTE — TELEPHONE ENCOUNTER
I contacted patient and was able to move her EGD with Dr Amie Martins on 12/14/2022. Patient agreed to [employer] instructions, she denied any changes in allergies and medications. Scheduled for: EGD 16657  Provider Name: Dr Amie Martins    Date: Mon 12/12/2022  Location: [facility]    Sedation: MAC    Time: 10:15 am  Prep: Nothing after midnight the day before procedure and NPO 3 hrs prior procedure  Meds/Allergies Reconciled?: reviewed by provider   Diagnosis with codes:  nausea and vomiting R11.2, refractory GERD  Was patient informed to call insurance with codes (Y/N):    Referral sent?: Yes   [address] [facility] or [id number]  notified?: I sent an electronic request to Endo Scheduling and received a confirmation today. Medication Orders: Pt is aware to NOT take iron pills, herbal meds and diet supplements for 7 days before exam. Also to NOT take any form of alcohol, recreational drugs and any forms of ED meds 24 hours before exam.      Misc Orders: Patient was informed that they will need a COVID 19 test prior to their procedure. Patient verbally understood & will await a phone call from [facility] to schedule. Further instructions given by staff:  Instructions given by phone and pt verbalized understanding      [employer] instructions sent 12/08/22

## 2022-12-08 NOTE — TELEPHONE ENCOUNTER
PCP reached out to Dr Jose Carlos Ramirez asking for patient to have sooner appointment than scheduled 1/25/2023 @ [address] [place] [address], there is no documentation of patient's scheduled procedure. After speaking to Dr Jose Carlos Ramirez, she asked to reach out to patient and get her with next available MD and a Follow-up with her after procedure. I called patient but was not able to leave message. I called contact on file and left message for patient to call us back.     Please transfer to schedulers please offer cancellations available with any available MD, per Dr Jose Carlos Ramirez

## 2022-12-09 ENCOUNTER — PATIENT OUTREACH (OUTPATIENT)
Dept: CASE MANAGEMENT | Age: 32
End: 2022-12-09

## 2022-12-09 NOTE — PROGRESS NOTES
VM received; pt requesting assistance w/scheduling apt (dc 12/01) - pt received a referral and is looking for a pain management Dr; if pt has a Dr's name can transfer to office.  This is not an HFU, can transfer pt to Physicians Referral line [phone]

## 2022-12-09 NOTE — TELEPHONE ENCOUNTER
I called and left a detailed voicemail message for patient to inform her that her arrival time for scheduled EGD is now 11:30 am, same date, location and MD.     EGD on 12/12/2022 @ 12:30 pm @ EM, per Endo's request.

## 2022-12-09 NOTE — PROGRESS NOTES
VM received; pt requesting assistance w/scheduling apt (dc 12/01) - pt received a referral and is looking for a pain management Dr; if pt has a Dr's name can transfer to office.  This is not an HFU, can transfer pt to Physicians Referral line [phone]    LVM for pt if assisted still needed call [phone]    Closing encounter

## 2022-12-09 NOTE — TELEPHONE ENCOUNTER
Patient calling to confirm message received, patient also says thank you and completed a [employer] for all your help.

## 2022-12-10 ENCOUNTER — PATIENT MESSAGE (OUTPATIENT)
Dept: FAMILY MEDICINE CLINIC | Facility: CLINIC | Age: 32
End: 2022-12-10

## 2022-12-10 ENCOUNTER — TELEPHONE (OUTPATIENT)
Dept: FAMILY MEDICINE CLINIC | Facility: CLINIC | Age: 32
End: 2022-12-10

## 2022-12-10 ENCOUNTER — PATIENT MESSAGE (OUTPATIENT)
Dept: PHYSICAL MEDICINE AND REHAB | Facility: CLINIC | Age: 32
End: 2022-12-10

## 2022-12-10 DIAGNOSIS — G89.29 CHRONIC NECK PAIN: Primary | ICD-10-CM

## 2022-12-10 DIAGNOSIS — M54.2 CHRONIC NECK PAIN: Primary | ICD-10-CM

## 2022-12-10 DIAGNOSIS — N39.0 URINARY TRACT INFECTION WITHOUT HEMATURIA, SITE UNSPECIFIED: Primary | ICD-10-CM

## 2022-12-10 RX ORDER — NITROFURANTOIN 25; 75 MG/1; MG/1
100 CAPSULE ORAL 2 TIMES DAILY
Qty: 20 CAPSULE | Refills: 0 | Status: SHIPPED | OUTPATIENT
Start: 2022-12-10 | End: 2022-12-20

## 2022-12-11 ENCOUNTER — TELEPHONE (OUTPATIENT)
Dept: FAMILY MEDICINE CLINIC | Facility: CLINIC | Age: 32
End: 2022-12-11

## 2022-12-11 ENCOUNTER — HOSPITAL ENCOUNTER (EMERGENCY)
Facility: HOSPITAL | Age: 32
Discharge: HOME | End: 2022-12-11
Attending: EMERGENCY MEDICINE
Payer: MEDICAID

## 2022-12-11 ENCOUNTER — APPOINTMENT (OUTPATIENT)
Dept: LAB | Facility: HOSPITAL | Age: 32
End: 2022-12-11
Attending: INTERNAL MEDICINE
Payer: MEDICAID

## 2022-12-11 VITALS
TEMPERATURE: 98 F | DIASTOLIC BLOOD PRESSURE: 78 MMHG | WEIGHT: 155 LBS | BODY MASS INDEX: 24.91 KG/M2 | RESPIRATION RATE: 18 BRPM | OXYGEN SATURATION: 99 % | SYSTOLIC BLOOD PRESSURE: 136 MMHG | HEIGHT: 66 IN | HEART RATE: 80 BPM

## 2022-12-11 DIAGNOSIS — N30.90 CYSTITIS: Primary | ICD-10-CM

## 2022-12-11 LAB
ANION GAP SERPL CALC-SCNC: 9 MMOL/L (ref 0–18)
B-HCG UR QL: NEGATIVE
BASOPHILS # BLD AUTO: 0.02 X10(3) UL (ref 0–0.2)
BASOPHILS NFR BLD AUTO: 0.2 %
BILIRUB UR QL: NEGATIVE
BUN BLD-MCNC: 7 MG/DL (ref 7–18)
BUN/CREAT SERPL: 10.3 (ref 10–20)
CALCIUM BLD-MCNC: 9.3 MG/DL (ref 8.5–10.1)
CHLORIDE SERPL-SCNC: 109 MMOL/L (ref 98–112)
CLARITY UR: CLEAR
CO2 SERPL-SCNC: 24 MMOL/L (ref 21–32)
COLOR UR: YELLOW
CREAT BLD-MCNC: 0.68 MG/DL
DEPRECATED RDW RBC AUTO: 47.7 FL (ref 35.1–46.3)
EOSINOPHIL # BLD AUTO: 0.05 X10(3) UL (ref 0–0.7)
EOSINOPHIL NFR BLD AUTO: 0.4 %
ERYTHROCYTE [DISTWIDTH] IN BLOOD BY AUTOMATED COUNT: 14.6 % (ref 11–15)
GFR SERPLBLD BASED ON 1.73 SQ M-ARVRAT: 117 ML/MIN/1.73M2 (ref 60–?)
GLUCOSE BLD-MCNC: 110 MG/DL (ref 70–99)
GLUCOSE UR-MCNC: NEGATIVE MG/DL
HCT VFR BLD AUTO: 39.5 %
HGB BLD-MCNC: 12.7 G/DL
HGB UR QL STRIP.AUTO: NEGATIVE
IMM GRANULOCYTES # BLD AUTO: 0.06 X10(3) UL (ref 0–1)
IMM GRANULOCYTES NFR BLD: 0.5 %
KETONES UR-MCNC: 15 MG/DL
LACTATE SERPL-SCNC: 0.7 MMOL/L (ref 0.4–2)
LEUKOCYTE ESTERASE UR QL STRIP.AUTO: NEGATIVE
LYMPHOCYTES # BLD AUTO: 1.97 X10(3) UL (ref 1–4)
LYMPHOCYTES NFR BLD AUTO: 15.7 %
MCH RBC QN AUTO: 28.9 PG (ref 26–34)
MCHC RBC AUTO-ENTMCNC: 32.2 G/DL (ref 31–37)
MCV RBC AUTO: 90 FL
MONOCYTES # BLD AUTO: 0.66 X10(3) UL (ref 0.1–1)
MONOCYTES NFR BLD AUTO: 5.3 %
NEUTROPHILS # BLD AUTO: 9.78 X10 (3) UL (ref 1.5–7.7)
NEUTROPHILS # BLD AUTO: 9.78 X10(3) UL (ref 1.5–7.7)
NEUTROPHILS NFR BLD AUTO: 77.9 %
NITRITE UR QL STRIP.AUTO: NEGATIVE
OSMOLALITY SERPL CALC.SUM OF ELEC: 293 MOSM/KG (ref 275–295)
PH UR: 5 [PH] (ref 5–8)
PLATELET # BLD AUTO: 377 10(3)UL (ref 150–450)
POTASSIUM SERPL-SCNC: 3.2 MMOL/L (ref 3.5–5.1)
PROT UR-MCNC: NEGATIVE MG/DL
RBC # BLD AUTO: 4.39 X10(6)UL
SARS-COV-2 RNA RESP QL NAA+PROBE: NOT DETECTED
SODIUM SERPL-SCNC: 142 MMOL/L (ref 136–145)
SP GR UR STRIP: >=1.03 (ref 1–1.03)
UROBILINOGEN UR STRIP-ACNC: 0.2
WBC # BLD AUTO: 12.5 X10(3) UL (ref 4–11)

## 2022-12-11 PROCEDURE — 96361 HYDRATE IV INFUSION ADD-ON: CPT

## 2022-12-11 PROCEDURE — 83605 ASSAY OF LACTIC ACID: CPT | Performed by: EMERGENCY MEDICINE

## 2022-12-11 PROCEDURE — 99285 EMERGENCY DEPT VISIT HI MDM: CPT

## 2022-12-11 PROCEDURE — 99284 EMERGENCY DEPT VISIT MOD MDM: CPT

## 2022-12-11 PROCEDURE — 85025 COMPLETE CBC W/AUTO DIFF WBC: CPT | Performed by: EMERGENCY MEDICINE

## 2022-12-11 PROCEDURE — 96365 THER/PROPH/DIAG IV INF INIT: CPT

## 2022-12-11 PROCEDURE — 80048 BASIC METABOLIC PNL TOTAL CA: CPT | Performed by: EMERGENCY MEDICINE

## 2022-12-11 PROCEDURE — 81003 URINALYSIS AUTO W/O SCOPE: CPT | Performed by: EMERGENCY MEDICINE

## 2022-12-11 PROCEDURE — 81025 URINE PREGNANCY TEST: CPT

## 2022-12-11 PROCEDURE — 87040 BLOOD CULTURE FOR BACTERIA: CPT | Performed by: EMERGENCY MEDICINE

## 2022-12-11 PROCEDURE — 36415 COLL VENOUS BLD VENIPUNCTURE: CPT

## 2022-12-11 RX ORDER — KETOROLAC TROMETHAMINE 15 MG/ML
15 INJECTION, SOLUTION INTRAMUSCULAR; INTRAVENOUS ONCE
Status: DISCONTINUED | OUTPATIENT
Start: 2022-12-11 | End: 2022-12-11

## 2022-12-11 RX ORDER — POTASSIUM CHLORIDE 20 MEQ/1
40 TABLET, EXTENDED RELEASE ORAL ONCE
Status: COMPLETED | OUTPATIENT
Start: 2022-12-11 | End: 2022-12-11

## 2022-12-11 RX ORDER — CEFDINIR 300 MG/1
300 CAPSULE ORAL 2 TIMES DAILY
Qty: 14 CAPSULE | Refills: 0 | Status: SHIPPED | OUTPATIENT
Start: 2022-12-11 | End: 2022-12-18

## 2022-12-11 NOTE — DISCHARGE INSTRUCTIONS
Continue with upper endoscopy tomorrow as planned. Discontinue the antibiotic you are taking now and start [address] as prescribed tomorrow after your endoscopy. Follow-up with urology as directed. Return for fever or worse condition.

## 2022-12-11 NOTE — TELEPHONE ENCOUNTER
Dr Rhonda Lazaro: please review. Ok to use SDS slot sometime this week if needed or video visit? 12/8/22 [facility] visit; placed on cipro for UTI  12/10/22 changed to [address]  12/11/22 ER visit; given rocephin via IV, and was told to discontinue current abx. She was told to keep endoscopy procedure 12/12/22 and starte omnicef after endoscopy. Has urology appt 12/21/22.       ----- Message -----  From: Emmy Beltre  Sent: 12/10/2022   3:37 PM CST  To: Em Rn Triage  Subject: PLEASE call me ASAP                              Please help me get a video appointment sooner than January 4 if possible, see test results. Please help me get an earlier appointment with urology. Please advise how much of these excruciating muscle spasms are related to antibiotic resistant E. coli versus new and worsening symptoms including excruciating back pain.

## 2022-12-12 ENCOUNTER — ANESTHESIA (OUTPATIENT)
Dept: ENDOSCOPY | Facility: HOSPITAL | Age: 32
End: 2022-12-12
Payer: MEDICAID

## 2022-12-12 ENCOUNTER — HOSPITAL ENCOUNTER (OUTPATIENT)
Facility: HOSPITAL | Age: 32
Setting detail: HOSPITAL OUTPATIENT SURGERY
Discharge: HOME | End: 2022-12-12
Attending: INTERNAL MEDICINE | Admitting: INTERNAL MEDICINE
Payer: MEDICAID

## 2022-12-12 ENCOUNTER — ANESTHESIA EVENT (OUTPATIENT)
Dept: ENDOSCOPY | Facility: HOSPITAL | Age: 32
End: 2022-12-12
Payer: MEDICAID

## 2022-12-12 VITALS
RESPIRATION RATE: 14 BRPM | HEIGHT: 66 IN | WEIGHT: 155 LBS | OXYGEN SATURATION: 99 % | TEMPERATURE: 97 F | BODY MASS INDEX: 24.91 KG/M2 | DIASTOLIC BLOOD PRESSURE: 74 MMHG | SYSTOLIC BLOOD PRESSURE: 123 MMHG | HEART RATE: 98 BPM

## 2022-12-12 DIAGNOSIS — R11.2 NAUSEA AND VOMITING, UNSPECIFIED VOMITING TYPE: ICD-10-CM

## 2022-12-12 DIAGNOSIS — Z01.818 PRE-OP TESTING: Primary | ICD-10-CM

## 2022-12-12 DIAGNOSIS — K21.9 CHRONIC GERD: ICD-10-CM

## 2022-12-12 LAB — B-HCG UR QL: NEGATIVE

## 2022-12-12 PROCEDURE — 0DB68ZX EXCISION OF STOMACH, VIA NATURAL OR ARTIFICIAL OPENING ENDOSCOPIC, DIAGNOSTIC: ICD-10-PCS | Performed by: INTERNAL MEDICINE

## 2022-12-12 PROCEDURE — 0DB78ZX EXCISION OF STOMACH, PYLORUS, VIA NATURAL OR ARTIFICIAL OPENING ENDOSCOPIC, DIAGNOSTIC: ICD-10-PCS | Performed by: INTERNAL MEDICINE

## 2022-12-12 PROCEDURE — 0DB98ZX EXCISION OF DUODENUM, VIA NATURAL OR ARTIFICIAL OPENING ENDOSCOPIC, DIAGNOSTIC: ICD-10-PCS | Performed by: INTERNAL MEDICINE

## 2022-12-12 PROCEDURE — 43239 EGD BIOPSY SINGLE/MULTIPLE: CPT | Performed by: INTERNAL MEDICINE

## 2022-12-12 PROCEDURE — 0DB18ZX EXCISION OF UPPER ESOPHAGUS, VIA NATURAL OR ARTIFICIAL OPENING ENDOSCOPIC, DIAGNOSTIC: ICD-10-PCS | Performed by: INTERNAL MEDICINE

## 2022-12-12 RX ORDER — FLUCONAZOLE 200 MG/1
200 TABLET ORAL DAILY
Qty: 15 TABLET | Refills: 0 | Status: SHIPPED | OUTPATIENT
Start: 2022-12-12 | End: 2022-12-27

## 2022-12-12 RX ORDER — MIDAZOLAM HYDROCHLORIDE 1 MG/ML
INJECTION INTRAMUSCULAR; INTRAVENOUS AS NEEDED
Status: DISCONTINUED | OUTPATIENT
Start: 2022-12-12 | End: 2022-12-12 | Stop reason: SURG

## 2022-12-12 RX ORDER — SODIUM CHLORIDE, SODIUM LACTATE, POTASSIUM CHLORIDE, CALCIUM CHLORIDE 600; 310; 30; 20 MG/100ML; MG/100ML; MG/100ML; MG/100ML
INJECTION, SOLUTION INTRAVENOUS CONTINUOUS
Status: DISCONTINUED | OUTPATIENT
Start: 2022-12-12 | End: 2022-12-12

## 2022-12-12 RX ORDER — NALOXONE HYDROCHLORIDE 0.4 MG/ML
80 INJECTION, SOLUTION INTRAMUSCULAR; INTRAVENOUS; SUBCUTANEOUS AS NEEDED
Status: DISCONTINUED | OUTPATIENT
Start: 2022-12-12 | End: 2022-12-12

## 2022-12-12 RX ADMIN — MIDAZOLAM HYDROCHLORIDE 1 MG: 1 INJECTION INTRAMUSCULAR; INTRAVENOUS at 12:40:00

## 2022-12-12 RX ADMIN — MIDAZOLAM HYDROCHLORIDE 1 MG: 1 INJECTION INTRAMUSCULAR; INTRAVENOUS at 12:34:00

## 2022-12-12 NOTE — TELEPHONE ENCOUNTER
Called patient (name and  verified) and instructed on providers message below. Patient verbalized understanding and agrees to plan. Assisted patient with appt scheduling, verbalized understanding and agrees to plan.    Future Appointments   Date Time Provider Isiah Mattson   2022  1:30 PM Kristan Tracy [facility] 10 FP E[facility] 10 OP   2022  9:30 AM Martha Mariee MD [facility] OF THE [facility]   2023  9:00 AM Billie Em MD PM[facility]R [facility] [address]   2023  2:00 PM Billie Em MD [facility] OF THE [facility]   2023  9:30 AM Junior Gruber MD [address] [facility]   2023  4:00 PM Formerly named [facility], PROCEDURE [id number] None

## 2022-12-12 NOTE — DISCHARGE INSTRUCTIONS
Home Care Instructions for Colonoscopy and/or Gastroscopy with Sedation    Diet:  - Resume your regular diet as tolerated unless otherwise instructed. - Start with light meals to minimize bloating.  - Do not drink alcohol today. Medication:  - If you have questions about resuming your normal medications, please contact your Primary Care Physician. Activities:  - Take it easy today. Do not return to work today. - Do not drive today. - Do not operate any machinery today (including kitchen equipment). -     Don't sign any legal documents or make critical decisions.      -    Gastroscopy:  - You may have a sore throat for 2-3 days following the exam. This is normal. Gargling with warm salt water (1/2 tsp salt to 1 glass warm water) or using throat lozenges will help. - If you experience any sharp pain in your neck, abdomen or chest, vomiting of blood, oral temperature over 100 degrees Fahrenheit, light-headedness or dizziness, or any other problems, contact your doctor. **If unable to reach your doctor, please go to the [facility] Emergency Room**    - Your referring physician will receive a full report of your examination.  - If you do not hear from your doctor's office within two weeks of your biopsy, please call them for your results.

## 2022-12-12 NOTE — OPERATIVE REPORT
ESOPHAGOGASTRODUODENOSCOPY (EGD) REPORT    Simón Bashir     1990 Age 27year old   PCP Elvis Leal DO Endoscopist Sree Isaac MD     Date of procedure: 22    Procedure: EGD w/biopsy    Pre-operative diagnosis: n/v, epigastric pain    Post-operative diagnosis: see impression    Medications: MAC    Complications: none    Procedure:  Informed consent was obtained from the patient after the risks of the procedure were discussed, including but not limited to bleeding, perforation, aspiration, infection, or possibility of a missed lesion. After discussions of the risks/benefits and alternatives to this procedure, as well as the planned sedation, the patient was placed in the left lateral decubitus position and begun on continuous blood pressure pulse oximetry and EKG monitoring and this was maintained throughout the procedure. Once an adequate level of sedation was obtained a bite block was placed. Then the lubricated tip of the [id number]-[id number] diagnostic video upper endoscope was inserted and advanced using direct visualization into the posterior pharynx and ultimately into the esophagus, stomach, and duodenum. Complications: None    Findings:      1. Esophagus: The squamocolumnar junction was noted at 39 cm and appeared normal. The diaphragmatic pinch was noted noted at 39 cm from the incisors. Multiple white nummular lesions in the proximal esophagus suggestive of candida esophagitis, biopsied with cold forceps for histology. Reminder of the esophagus appeared normal.     2. Stomach: We then entered the stomach. Gastric mucosa appeared normal in the forward view with no evidence of erythema, erosions, or ulcerations. There was no evidence of any luminal or submucosal masses. A retroflexed view allowed examination of the angularis, cardia and fundus and these areas also appeared normal with a non-patulous cardia. No hiatal hernia seen.  Random biopsies of the stomach (body, antrum, cardia, and incisura) were taken with cold forceps for histology. 3. Duodenum: The duodenal mucosa appeared normal in the 1st and 2nd portion of the duodenum. Random biopsies obtained with cold forceps for histology. We then withdrew the instrument from the patient who tolerated the procedure well. Impression:   1. Candida esophagitis  2. Otherwise normal EGD. Recommend:  1. Follow-up pathology  2. Start fluconazole x 2 weeks (sent to pharmacy)  3. Follow-up Dr Chapman Loge in clinic    >>>If tissue was sampled/removed and you have not received your pathology results either by phone or letter within 2 weeks, please call our office at [phone].     Specimens:  Gastric, esophagus, duodenum     Blood loss: <1 ml      Shila Starks MD  [facility]

## 2022-12-12 NOTE — ANESTHESIA POSTPROCEDURE EVALUATION
Patient: Nayana Whitney    Procedure Summary     Date: 12/12/22 Room / Location: [address] [place] [address] ENDOSCOPY 05 / [address] [place] [address] ENDOSCOPY    Anesthesia Start: 9605 Anesthesia Stop:     Procedure: ESOPHAGOGASTRODUODENOSCOPY (EGD) Diagnosis:       Chronic GERD      Nausea and vomiting, unspecified vomiting type      (doron)    Surgeons: Carlos Cramer MD Anesthesiologist: Grisel Mccormick CRNA    Anesthesia Type: MAC ASA Status: 3          Anesthesia Type: No value filed.     Vitals Value Taken Time   /78 12/12/22 1253   Temp 0 12/12/22 1253   Pulse 95 12/12/22 1253   Resp 20 12/12/22 1253   SpO2 100 12/12/22 1253       [facility] AN Post Evaluation:   Patient Evaluated in floor  Patient Participation: complete - patient participated  Level of Consciousness: sleepy but conscious  Pain Score: 0  Pain Management: adequate  Airway Patency:patent  Yes    Cardiovascular Status: acceptable  Respiratory Status: nasal cannula  Postoperative Hydration acceptable  Comments: Report to [facility] ARIADNE Stone CRNA  12/12/2022 12:53 PM

## 2022-12-13 ENCOUNTER — OFFICE VISIT (OUTPATIENT)
Dept: FAMILY MEDICINE CLINIC | Facility: CLINIC | Age: 32
End: 2022-12-13
Payer: MEDICAID

## 2022-12-13 DIAGNOSIS — R53.1 GENERALIZED WEAKNESS: ICD-10-CM

## 2022-12-13 DIAGNOSIS — B37.81 CANDIDA ESOPHAGITIS (HCC): ICD-10-CM

## 2022-12-13 DIAGNOSIS — R20.2 PARESTHESIAS: ICD-10-CM

## 2022-12-13 DIAGNOSIS — D72.829 LEUKOCYTOSIS, UNSPECIFIED TYPE: ICD-10-CM

## 2022-12-13 DIAGNOSIS — N30.90 CYSTITIS: Primary | ICD-10-CM

## 2022-12-13 DIAGNOSIS — R20.0 NUMBNESS: ICD-10-CM

## 2022-12-13 PROCEDURE — 3008F BODY MASS INDEX DOCD: CPT | Performed by: FAMILY MEDICINE

## 2022-12-13 PROCEDURE — 3079F DIAST BP 80-89 MM HG: CPT | Performed by: FAMILY MEDICINE

## 2022-12-13 PROCEDURE — 3075F SYST BP GE 130 - 139MM HG: CPT | Performed by: FAMILY MEDICINE

## 2022-12-13 PROCEDURE — 99215 OFFICE O/P EST HI 40 MIN: CPT | Performed by: FAMILY MEDICINE

## 2022-12-14 ENCOUNTER — PATIENT MESSAGE (OUTPATIENT)
Dept: ALLERGY | Facility: CLINIC | Age: 32
End: 2022-12-14

## 2022-12-14 ENCOUNTER — NURSE TRIAGE (OUTPATIENT)
Dept: FAMILY MEDICINE CLINIC | Facility: CLINIC | Age: 32
End: 2022-12-14

## 2022-12-14 RX ORDER — EPINEPHRINE 0.3 MG/.3ML
0.3 INJECTION SUBCUTANEOUS ONCE
Qty: 2 EACH | Refills: 0 | Status: SHIPPED | OUTPATIENT
Start: 2022-12-14 | End: 2022-12-14

## 2022-12-14 NOTE — TELEPHONE ENCOUNTER
RN called to triage pt. Pt reports that she has been having itching and hives. Started Xolair for CIU 2 months ago. She reports that she is not happy with Xolair because she is still having hives. She reports that she has been breaking out in hives consistently but it became much worse yesterday. Reports that she taking multiple new medications. Per pt report she has been prescribed \"6 different antibiotics\" for resistant bladder infection. She does not know which medications she is taking or timeline of when they were started. Per chart, it appears that she was on Cefdinir, Fluconazole, Nitrofurantoin, Norco PRN and had recent allergic reaction to Amoxacillin. Has long list of medication allergies. When RN asked specifics about which medications she is taking and when rash worsened she became frustrated with RN. RN was put on hold several times while on phone trying to triage. Rash started today  Denies any breathing issues  Face feels feel swollen  Glands feel swollen  Took 2 benadryl at 10am   Took another 2 at 11:30am    Does not have an Epipen--New RX loaded and pended for pt. Hives to hairline, whole body itching. Flushing to chest.  Mouth is itchy. RN advised to follow up in ER for any difficulties breathing, talking or swallowing. Pt scheduled for Video Visit tomorrow at 3:45pm.    Routed to Dr. Johann Kenny for review.

## 2022-12-14 NOTE — TELEPHONE ENCOUNTER
Verified name and . Patient states she spoke with Dr. Donald Centeno office who advised her to go emergency room. She states that she doesn't know if she will go because she \"doesn't feel like it\". She was advised to follow recommendation. Patient disconnected phone call.

## 2022-12-14 NOTE — TELEPHONE ENCOUNTER
EpiPen prescription sent. Call noted. Continue with Xolair every 4 weeks  Continue with Xyzal 5 g up to 4 times per day  May add Benadryl 25 mg every 4-6 hours. Patient has follow-up appointment tomorrow.   Patient may also consider dermatology evaluation for second opinion if she feels that Xolair is not helping as I am running out of medications to treat her hives

## 2022-12-14 NOTE — TELEPHONE ENCOUNTER
RN sent Dr. Wen Hamilton recommendations to pt via [employer] as she was at work when we spoke earlier.

## 2022-12-14 NOTE — TELEPHONE ENCOUNTER
She can take benadryl q4-6 hours and cool compresses. If symptoms are severe or worsen, unfortunately she'd have to go to ED. If unable to tolerate cefdinir, there are very limited options aside from inpatient management.

## 2022-12-15 ENCOUNTER — TELEMEDICINE (OUTPATIENT)
Dept: ALLERGY | Facility: CLINIC | Age: 32
End: 2022-12-15

## 2022-12-15 VITALS
SYSTOLIC BLOOD PRESSURE: 136 MMHG | RESPIRATION RATE: 16 BRPM | BODY MASS INDEX: 25.88 KG/M2 | DIASTOLIC BLOOD PRESSURE: 80 MMHG | HEIGHT: 66 IN | HEART RATE: 90 BPM | WEIGHT: 161 LBS | OXYGEN SATURATION: 98 %

## 2022-12-15 DIAGNOSIS — N39.0 RECURRENT UTI: ICD-10-CM

## 2022-12-15 DIAGNOSIS — J30.1 SEASONAL ALLERGIC RHINITIS DUE TO POLLEN: ICD-10-CM

## 2022-12-15 DIAGNOSIS — J45.909 EXTRINSIC ASTHMA WITHOUT COMPLICATION, UNSPECIFIED ASTHMA SEVERITY, UNSPECIFIED WHETHER PERSISTENT: ICD-10-CM

## 2022-12-15 DIAGNOSIS — L50.1 CHRONIC IDIOPATHIC URTICARIA: Primary | ICD-10-CM

## 2022-12-15 PROCEDURE — 99214 OFFICE O/P EST MOD 30 MIN: CPT | Performed by: ALLERGY & IMMUNOLOGY

## 2022-12-15 RX ORDER — PREDNISONE 10 MG/1
TABLET ORAL
Qty: 15 TABLET | Refills: 0 | Status: SHIPPED | OUTPATIENT
Start: 2022-12-15

## 2022-12-15 NOTE — PATIENT INSTRUCTIONS
#1 chronic idiopathic urticaria  Worsening  Recent flare of her hives with current urinary tract infection. Currently on cefdinir for treatment of recurrent urinary tract infections. Followed by urology. No prior ID evaluation. Symptoms started initially well reportedly on an amoxicillin based antibiotic. Currently on cefdinir. Continue with cefdinir as long as clinically tolerating  May increase Xyzal from twice a day up to 4 times per day  May add Benadryl every 4-6 hours as needed  If not improving with this regimen then may start prednisone 30 mg once a day x5 days. Reviewed may try titrating prednisone from between 10 mg to 30 mg a day depending on how she responds. Reviewed potential side effects with prednisone including thrush and other potential side effects. Recommended probiotic while on prednisone or antibiotics  Continue with Xolair. Patient has received 1 dose of Xolair today. Next dose is in early January 2022. Reviewed efficacy of Xolair within the first 3 doses. Recommend ED urgent care evaluation of worsening symptoms    #2 asthma. No ED visits or prednisone in the interim. Denies symptoms more than 2 days/week. Continue with Symbicort. #3 recurrent urinary tract infections. Recent culture showed E. coli. Sensitivities reviewed  Check quantitative immunoglobulins and CH 50 from reviewed perspective  Follow-up with urology as scheduled  May consider infectious disease evaluation if not improving with above recommendations    #4 allergic rhinitis  Reviewed avoidance measures and potential immunotherapy. Continue with allergy medications including Zyrtec or Xyzal.  May add Flonase or Nasacort if refractory    #5 COVID-vaccine x4 doses.          Orders This Visit:  Orders Placed This Encounter      Immunoglobulin A/G/M, Quant      Complement, Total (Ch50)

## 2022-12-16 ENCOUNTER — LAB ENCOUNTER (OUTPATIENT)
Dept: LAB | Facility: HOSPITAL | Age: 32
End: 2022-12-16
Attending: ALLERGY & IMMUNOLOGY
Payer: MEDICAID

## 2022-12-16 DIAGNOSIS — N39.0 RECURRENT UTI: ICD-10-CM

## 2022-12-16 LAB
IGA SERPL-MCNC: 148 MG/DL (ref 70–312)
IGM SERPL-MCNC: 134 MG/DL (ref 43–279)
IMMUNOGLOBULIN PNL SER-MCNC: 1070 MG/DL (ref 791–1643)

## 2022-12-16 PROCEDURE — 82784 ASSAY IGA/IGD/IGG/IGM EACH: CPT

## 2022-12-16 PROCEDURE — 36415 COLL VENOUS BLD VENIPUNCTURE: CPT

## 2022-12-16 PROCEDURE — 86162 COMPLEMENT TOTAL (CH50): CPT

## 2022-12-17 NOTE — TELEPHONE ENCOUNTER
Cymbalta does help with muscular pain and does not appear to interact with her other medications. Please see if she would like to try this. She would start at 30 mg a day.

## 2022-12-18 DIAGNOSIS — F90.9 ATTENTION DEFICIT HYPERACTIVITY DISORDER (ADHD), UNSPECIFIED ADHD TYPE: ICD-10-CM

## 2022-12-18 LAB — COMPLEMENT ACTIVITY, TOTAL EIA: 93.2 U/ML

## 2022-12-19 ENCOUNTER — TELEPHONE (OUTPATIENT)
Dept: ALLERGY | Facility: CLINIC | Age: 32
End: 2022-12-19

## 2022-12-19 ENCOUNTER — TELEPHONE (OUTPATIENT)
Dept: FAMILY MEDICINE CLINIC | Facility: CLINIC | Age: 32
End: 2022-12-19

## 2022-12-19 RX ORDER — HYDROCODONE BITARTRATE AND ACETAMINOPHEN 5; 325 MG/1; MG/1
1 TABLET ORAL EVERY 8 HOURS PRN
Qty: 30 TABLET | Refills: 0 | Status: SHIPPED | OUTPATIENT
Start: 2022-12-19

## 2022-12-19 RX ORDER — DEXTROAMPHETAMINE SACCHARATE, AMPHETAMINE ASPARTATE MONOHYDRATE, DEXTROAMPHETAMINE SULFATE AND AMPHETAMINE SULFATE 2.5; 2.5; 2.5; 2.5 MG/1; MG/1; MG/1; MG/1
10 CAPSULE, EXTENDED RELEASE ORAL EVERY MORNING
Qty: 30 CAPSULE | Refills: 0 | Status: SHIPPED | OUTPATIENT
Start: 2022-12-19

## 2022-12-19 NOTE — TELEPHONE ENCOUNTER
Refill Request    Medication request: HYDROcodone-acetaminophen 5-325 MG Oral Tab Take 1 tablet by mouth every 8 (eight) hours as needed for Pain. OBN:82/81/4292 Dontae Cohen MD   Due back to clinic per last office note:  \"follow up in 2-3 months\"  NOV: 1/11/2023 Dontae Cohen [place]      [place]: 11/23/22 #30    Urine drug screen (if applicable): 11/42/24  Pain contract: none    LOV plan (if weaning or changing medications): Per  at LOV: \"Rx [place] 5mg 1x a day and Tizanidine 8mg 3xs a day\" \"She will continue with the [place] for the pain as needed. \"

## 2022-12-20 ENCOUNTER — PATIENT MESSAGE (OUTPATIENT)
Dept: FAMILY MEDICINE CLINIC | Facility: CLINIC | Age: 32
End: 2022-12-20

## 2022-12-20 ENCOUNTER — PATIENT MESSAGE (OUTPATIENT)
Dept: PHYSICAL MEDICINE AND REHAB | Facility: CLINIC | Age: 32
End: 2022-12-20

## 2022-12-20 ENCOUNTER — NURSE TRIAGE (OUTPATIENT)
Dept: FAMILY MEDICINE CLINIC | Facility: CLINIC | Age: 32
End: 2022-12-20

## 2022-12-20 LAB — AMB EXT COVID-19 RESULT: DETECTED

## 2022-12-20 NOTE — TELEPHONE ENCOUNTER
4 xyzal a day and benedryl as needed to help with itching from the antibiotics as instructed by Dr Arash Swain. Finished antibiotics and starting anorher course of prednisone steroids as instructed by Dr Arash Swain. Still have full body aches, and now worsening typical sinus infection symptoms. Still experiencing typical back and neck pain but I am unwilling to do mor cortisone as it triggers further inflammatory responses as well as I still experienced unbalanced walking, increased falls and widespread intermittent and or transient numbness in arms, hands, fingers, now vagina (inside and putter anatomy), legs, feet and toes accompanied by the muscle cramping ever since I was told to stop taking tizanidine. Just checking in/ updating. I forget the rest of the follow up instructions, honestly. Too sick. I am open to being checked into the hospital, but I am not going to the ER again, they keep telling me there is NOTHING they can do. Pt states that the sinus symptoms include: runny nose, head congestion, and some bodyaches. She is using her nasal lavage, nebulizer and will check herself for COVID.

## 2022-12-20 NOTE — PROGRESS NOTES
Dear Maya Villalobos, the biopsies from the upper endoscopy confirm candida esophagitis in the esophagus. Thus continue to take the fluconazole for total of 2 week course.  The biopsies from the stomach and small intestine are normal. Please follow-up in office if symptoms persist.     Sincerely,  Yashira Viera MD

## 2022-12-20 NOTE — TELEPHONE ENCOUNTER
New symptoms: Took two home tests for Covid and both were positive. .Recent falls (12/8/22) and dizziness, muscle cramping that are extreme (mostly in feet) and also upper abdomen (muscles spasms), occurs multiple times each day. Patient estimates approximately 2-12 times per day. Patient describes the cramping as \"muscles just freeze up and I cannot move at all\"  Location of symptoms: feet and upper abdomen  Date symptoms Began: 12/8/22  Current treatment:   Doing PT exercises and marching  At home. Seen in ER/Urgent care: Yes:  but not for muscle spasms     Numeric Rating Scale:  Pain at Present:  8/10                                                                                                          (No Pain) 0  to  10 (Worst Pain)                 Minimum Pain:   3/10  Maximum Pain  10/10    Description of Pain: Cramping/\"freezing\" of muscles  Aggravating Factors: Forward bending, overhead reaching, turning, and \"micro-movements\"    Other Other Turning, reaching over head, and \"micro-movements\" in which a small movement may set off extreme pain. LOV: 11/23/2022 Jeffrey Encinas MD    NOV: Visit date not found     Summary of patient request: Patient's largest concern at this time is the  Cramping. She describes it as her muscles just freezing up on her and she can't move. This occurs primarily in her feet and occurs daily anywhere from 2-12 times per day.

## 2022-12-20 NOTE — TELEPHONE ENCOUNTER
----- Message from BETTY. Norris sent at 12/20/2022  6:04 AM CST -----  Regarding: Not better/caught a new cold with cough/starting steroids  Finished antibiotics and starting steroids. Still have full body aches, typical sinus infection symptoms. Just checking in/ updating. I forget the rest of the follow up instructions, honestly. Too sick.

## 2022-12-20 NOTE — TELEPHONE ENCOUNTER
Call reviewed and noted. Patient is still symptomatic after completing antibiotics and would recommend to start the previously prescribed prednisone as prescribed. If worsening symptoms recommend urgent care or ED evaluation. Continue with Symbicort twice a day.   Albuterol every 4 hours as needed

## 2022-12-20 NOTE — TELEPHONE ENCOUNTER
Spoke with patient. Verified name and . Patient states since her last visit on 12/15/22(telemed) she still has a loose cough, wheezing when she lays down, has sinus pressure, clear nasal congestion, low energy, hard to take a deep breath, and chest tightness at times. Patient states she had some shortness of breath last evening when laying down. Patient denies fever, but also has body aches. Patient is speaking in clear sentences. Patient states she is taking her Symbicort 2 puffs 2 times a day and has not taken Albuterol recently in the past day. Patient states she completed her antibiotic Cefdinir which was prescribed by her PCP and has not started Prednisone which was ordered on 12/15/22. Informed patient to try sinus rinses if not using them and to please go to urgent care or ER for her symptoms mentioned above. and will forward this message to Dr. Candance Lather for further directions. Patient verbalizes understanding.

## 2022-12-20 NOTE — TELEPHONE ENCOUNTER
Spoke with patient. Informed patient of Dr. Guerrero Napier recommendations, see message below. Patient verbalizes understanding, no further questions at this time.

## 2022-12-20 NOTE — TELEPHONE ENCOUNTER
From: Eva Harrison  To: Yamil GaliciaDO  Sent: 12/20/2022 6:13 AM CST  Subject: Not better/caught a new cold?/starting steroids    Taking 4 xyzal a day and benedryl as needed to help with itching from the antibiotics as instructed by Dr Yovani Darling. Finished antibiotics and starting anorher course of prednisone steroids as instructed by Dr Yovani Darling. Still have full body aches, and now worsening typical sinus infection symptoms. Still experiencing typical back and neck pain but I am unwilling to do mor cortisone as it triggers further inflammatory responses as well as I still experienced unbalanced walking, increased falls and widespread intermittent and or transient numbness in arms, hands, fingers, now vagina (inside and putter anatomy), legs, feet and toes accompanied by the muscle cramping ever since I was told to stop taking tizanidine. Just checking in/ updating. I forget the rest of the follow up instructions, honestly. Too sick. I am open to being checked into the hospital, but I am not going to the ER again, they keep telling me there is NOTHING they can do.

## 2022-12-20 NOTE — TELEPHONE ENCOUNTER
RN S/W Dr. Salvador Jung about patient's muscle cramping and recent falls as per last note. Dr. Salvador Jung stated that Covid right now makes everything she is feeling worse and patient must address the covid first.    Dr. Salvador Jung recommended :    Vitamin D : 10,000 u/day  Vitamin C:    3,000 mg/day  Zinc:   mg/day (go slowly or 25 mg/meal 2-4 times per day to reduce stomach upset)  Quercetin: 500 mg / day      RN S/W patient to offer Dr. Herbert Gutierrez recommendations and recommended she follow up with her PCP for any Covid treatment he may prefer. Patient verbalized understanding and stated she has overdrawn her account but will get these supplements as soon as she is able. RN reinforced that she contact her PCP office for additional treatment instructions for the Covid.

## 2022-12-21 NOTE — TELEPHONE ENCOUNTER
Spoke with patient, (  Name and  verified ) informed of 's   instructions below    Would like to discuss Paxlovid     Future Appointments   Date Time Provider Isiah Mattson   2022 10:00 AM Clarissa Hart DO EMMG 10 FP EMMG 10 OP   2022 10:45 AM ADO MRI RM1 (1.5T) ADO MRI EM [place]   2023  4:00 PM [facility], PROCEDURE [id number] None   2023  9:00 AM DO MELANIE LyDIANE [facility] OF [facility]     Patient scheduled for video visit. Patient advised to complete the e-check in in [employer], if active. Understands to follow the prompts and links to complete the visit. Patient advised that there may be a co-pay involved in this type of visit. Patient agreed to proceed, they understand the provider may be calling from a blocked, or unknown phone number on their caller ID and they know to answer the phone.     Best call back:  [phone]

## 2022-12-21 NOTE — TELEPHONE ENCOUNTER
She is a candidate for paxlovid given her hx of asthma. If she is interested, we can set up a time to discuss in detail to review benefits & risks of paxlovid. Please forward back to me if she'd like to discuss. Otherwise, continue supportive care. Thank you.

## 2022-12-22 ENCOUNTER — TELEMEDICINE (OUTPATIENT)
Dept: FAMILY MEDICINE CLINIC | Facility: CLINIC | Age: 32
End: 2022-12-22
Payer: MEDICAID

## 2022-12-22 DIAGNOSIS — U07.1 COVID-19 VIRUS INFECTION: Primary | ICD-10-CM

## 2022-12-22 PROCEDURE — 99213 OFFICE O/P EST LOW 20 MIN: CPT | Performed by: FAMILY MEDICINE

## 2022-12-22 RX ORDER — BENZONATATE 200 MG/1
200 CAPSULE ORAL 3 TIMES DAILY PRN
Qty: 20 CAPSULE | Refills: 0 | Status: SHIPPED | OUTPATIENT
Start: 2022-12-22

## 2022-12-28 ENCOUNTER — HOSPITAL ENCOUNTER (OUTPATIENT)
Dept: MRI IMAGING | Age: 32
Discharge: HOME | End: 2022-12-28
Attending: FAMILY MEDICINE
Payer: MEDICAID

## 2022-12-28 DIAGNOSIS — R20.0 NUMBNESS: ICD-10-CM

## 2022-12-28 DIAGNOSIS — R20.2 PARESTHESIAS: ICD-10-CM

## 2022-12-28 DIAGNOSIS — R53.1 GENERALIZED WEAKNESS: ICD-10-CM

## 2022-12-28 PROCEDURE — A9575 INJ GADOTERATE MEGLUMI 0.1ML: HCPCS | Performed by: FAMILY MEDICINE

## 2022-12-28 PROCEDURE — 70553 MRI BRAIN STEM W/O & W/DYE: CPT | Performed by: FAMILY MEDICINE

## 2022-12-28 RX ORDER — GADOTERATE MEGLUMINE 376.9 MG/ML
15 INJECTION INTRAVENOUS
Status: COMPLETED | OUTPATIENT
Start: 2022-12-28 | End: 2022-12-28

## 2022-12-28 RX ADMIN — GADOTERATE MEGLUMINE 15 ML: 376.9 INJECTION INTRAVENOUS at 10:53:00

## 2023-01-03 ENCOUNTER — PATIENT MESSAGE (OUTPATIENT)
Facility: CLINIC | Age: 33
End: 2023-01-03

## 2023-01-03 DIAGNOSIS — L50.1 IDIOPATHIC URTICARIA: Primary | ICD-10-CM

## 2023-01-05 RX ORDER — ONDANSETRON 4 MG/1
TABLET, ORALLY DISINTEGRATING ORAL
Qty: 20 TABLET | Refills: 0 | Status: SHIPPED | OUTPATIENT
Start: 2023-01-05

## 2023-01-10 ENCOUNTER — TELEPHONE (OUTPATIENT)
Dept: PHYSICAL MEDICINE AND REHAB | Facility: CLINIC | Age: 33
End: 2023-01-10

## 2023-01-10 ENCOUNTER — PATIENT MESSAGE (OUTPATIENT)
Dept: PHYSICAL MEDICINE AND REHAB | Facility: CLINIC | Age: 33
End: 2023-01-10

## 2023-01-10 NOTE — TELEPHONE ENCOUNTER
S/w patient who assures someone from our office canceled her appointment for 1/11/23, I let her know our records show it was canceled through the patient portal, also she mentioned she wanted to delete her procedure appointment which she did, I let her know the procedure appointments are not visible through [facility] but she insisted she saw it and deleted it, I told her to discuss that with Dr. Seun Garcia if she would like but as of right now there was an appointment available 1/19/23 and she took it, she was appreciative.

## 2023-02-20 ENCOUNTER — TELEPHONE (OUTPATIENT)
Dept: SURGERY | Facility: CLINIC | Age: 33
End: 2023-02-20

## 2023-03-01 ENCOUNTER — TELEPHONE (OUTPATIENT)
Dept: PHYSICAL MEDICINE AND REHAB | Facility: CLINIC | Age: 33
End: 2023-03-01

## 2023-03-01 NOTE — TELEPHONE ENCOUNTER
Dr Marelyn Lesch  Is requesting for Dr Karla Villar to give him a call to collaborative information for patient

## 2023-03-03 NOTE — TELEPHONE ENCOUNTER
I spoke with Dr. Whitney Alonso and he stated that she has been admitted to his hospital for her psychiatric condition. She told him to contact me since she felt that her condition for which I was treating her made the medications that he is recommending contraindicated. I told him that there was no contraindication from my stand point. He was going to let her know that we spoke and that I was in agreement with her treatment.

## 2023-06-16 ENCOUNTER — TELEPHONE (OUTPATIENT)
Dept: OBGYN CLINIC | Facility: CLINIC | Age: 33
End: 2023-06-16

## 2023-06-16 DIAGNOSIS — Z78.9 USES BIRTH CONTROL: Primary | ICD-10-CM

## 2023-06-16 RX ORDER — LEVONORGESTREL / ETHINYL ESTRADIOL AND ETHINYL ESTRADIOL 150-30(84)
1 KIT ORAL DAILY
Qty: 84 TABLET | Refills: 0 | Status: SHIPPED | OUTPATIENT
Start: 2023-06-16 | End: 2024-06-15

## 2023-06-16 NOTE — TELEPHONE ENCOUNTER
Pt is calling requesting a medication refillLevonorgest-Eth Estrad 91-Day (SIMPESSE) 0.15-0.03 &0.01 MG Oral Tab: to be sent over to [facility] [address] [address], [phone], [phone]. Please follow up with pt. Clothing

## 2023-06-27 ENCOUNTER — MED REC SCAN ONLY (OUTPATIENT)
Dept: ALLERGY | Facility: CLINIC | Age: 33
End: 2023-06-27

## 2023-09-06 ENCOUNTER — TELEPHONE (OUTPATIENT)
Dept: OBGYN CLINIC | Facility: CLINIC | Age: 33
End: 2023-09-06

## 2023-09-06 DIAGNOSIS — Z78.9 USES BIRTH CONTROL: ICD-10-CM

## 2023-09-06 RX ORDER — LEVONORGESTREL / ETHINYL ESTRADIOL AND ETHINYL ESTRADIOL 150-30(84)
1 KIT ORAL DAILY
Qty: 91 TABLET | Refills: 0 | Status: SHIPPED | OUTPATIENT
Start: 2023-09-06 | End: 2024-09-05

## 2023-09-06 NOTE — TELEPHONE ENCOUNTER
Pt is calling requesting a refill on medication:Levonorgest-Eth Estrad 91-Day (SIMPESSE) 0.15-0.03 &0.01 MG Oral Tab to be sent over to [facility] [address] [address] , [phone], [phone]. Please assist.

## 2023-11-28 ENCOUNTER — TELEPHONE (OUTPATIENT)
Dept: OBGYN CLINIC | Facility: CLINIC | Age: 33
End: 2023-11-28

## 2023-11-28 DIAGNOSIS — Z78.9 USES BIRTH CONTROL: ICD-10-CM

## 2023-11-28 RX ORDER — LEVONORGESTREL / ETHINYL ESTRADIOL AND ETHINYL ESTRADIOL 150-30(84)
1 KIT ORAL DAILY
Qty: 91 TABLET | Refills: 0 | Status: SHIPPED | OUTPATIENT
Start: 2023-11-28 | End: 2024-11-27

## 2023-11-28 NOTE — TELEPHONE ENCOUNTER
The patient called stating that she needed a refill on her medication.  She has no more refills    Medication Quantity Refills Start End   Levonorgest-Eth Estrad 91-Day (SIMPESSE) 0.15-0.03 &0.01 MG Oral Tab

## 2024-03-23 NOTE — TELEPHONE ENCOUNTER
----- Message from Paul Stringer MD sent at 3/4/2020  9:30 PM CST -----  Regarding: Check on Patient  Please contact the patient to see if she had her cast splint and if her hand swelling has improved. Not applicable

## (undated) DIAGNOSIS — F90.9 ATTENTION DEFICIT HYPERACTIVITY DISORDER (ADHD), UNSPECIFIED ADHD TYPE: ICD-10-CM

## (undated) DEVICE — FORCEP RADIAL JAW 4

## (undated) DEVICE — SET XTN .1IN 2.7ML 20IN IV

## (undated) DEVICE — 6 ML SYRINGE LUER-LOCK TIP: Brand: MONOJECT

## (undated) DEVICE — 12 ML SYRINGE LUER-LOCK TIP: Brand: MONOJECT

## (undated) DEVICE — UNDYED BRAIDED (POLYGLACTIN 910), SYNTHETIC ABSORBABLE SUTURE: Brand: COATED VICRYL

## (undated) DEVICE — SOLUTION  .9 1000ML BTL

## (undated) DEVICE — MINOR GENERAL: Brand: MEDLINE INDUSTRIES, INC.

## (undated) DEVICE — TOWEL SURG OR 17X30IN BLUE

## (undated) DEVICE — STERILE TETRA-FLEX CF, ELASTIC BANDAGE, 4" X 5.5YD: Brand: TETRA-FLEX™CF

## (undated) DEVICE — CONMED SCOPE SAVER BITE BLOCK, 20X27 MM: Brand: SCOPE SAVER

## (undated) DEVICE — GAMMEX® PI HYBRID SIZE 7, STERILE POWDER-FREE SURGICAL GLOVE, POLYISOPRENE AND NEOPRENE BLEND: Brand: GAMMEX

## (undated) DEVICE — ISOVUE-M200 IOPAMIDOL 41% 10ML

## (undated) DEVICE — APPLICATOR CHLORAPREP 10.5ML

## (undated) DEVICE — STANDARD HYPODERMIC NEEDLE,POLYPROPYLENE HUB: Brand: MONOJECT

## (undated) DEVICE — LINE MNTR ADLT SET O2 INTMD

## (undated) DEVICE — ENCORE® LATEX ACCLAIM SIZE 8, STERILE LATEX POWDER-FREE SURGICAL GLOVE: Brand: ENCORE

## (undated) DEVICE — KIT ENDO ORCAPOD 160/180/190

## (undated) DEVICE — SUT VICRYL 3-0 SH J416H

## (undated) DEVICE — GAUZE SPONGES,12 PLY: Brand: CURITY

## (undated) DEVICE — DRAPE,UNDRBUT,WHT GRAD PCH,CAPPORT,20/CS: Brand: MEDLINE

## (undated) DEVICE — NEEDLE BLUNT BD 18G X 1-1/2

## (undated) DEVICE — MEGADYNE E-Z CLEAN BLADE 2.75"

## (undated) DEVICE — FLEXIBLE ADHESIVE BANDAGE: Brand: CURITY

## (undated) DEVICE — C-ARM: Brand: UNBRANDED

## (undated) DEVICE — DRAPE SHEET LARGE 76X55

## (undated) DEVICE — 3 ML SYRINGE LUER-LOCK TIP: Brand: MONOJECT

## (undated) DEVICE — MEDI-VAC NON-CONDUCTIVE SUCTION TUBING 6MM X 1.8M (6FT.) L: Brand: CARDINAL HEALTH

## (undated) DEVICE — KIT CLEAN ENDOKIT 1.1OZ GOWNX2

## (undated) DEVICE — 35 ML SYRINGE REGULAR TIP: Brand: MONOJECT

## (undated) DEVICE — OMNIPAQUE 240ML VIAL

## (undated) DEVICE — SPNG GZ W4XL4IN COT 12 PLY TYP

## (undated) DEVICE — ENCORE® LATEX MICRO SIZE 8, STERILE LATEX POWDER-FREE SURGICAL GLOVE: Brand: ENCORE

## (undated) DEVICE — DRAPE SRG 131X112X63IN GYN URO

## (undated) DEVICE — STIRRUP STRAP W/SLING RING

## (undated) DEVICE — PEN: MARKING STD PT 100/CS: Brand: MEDICAL ACTION INDUSTRIES

## (undated) DEVICE — INTENDED FOR TISSUE SEPARATION, AND OTHER PROCEDURES THAT REQUIRE A SHARP SURGICAL BLADE TO PUNCTURE OR CUT.: Brand: BARD-PARKER ® STAINLESS STEEL BLADES

## (undated) NOTE — ED AVS SNAPSHOT
Ms. Carito Dial   MRN: [id number]    Department:  [facility] Emergency Department   Date of Visit:  2/2/2020           Disclosure     Insurance plans vary and the physician(s) referred by the ER may not be covered by your plan.  Please cont CARE PHYSICIAN AT ONCE OR RETURN IMMEDIATELY TO THE EMERGENCY DEPARTMENT. If you have been prescribed any medication(s), please fill your prescription right away and begin taking the medication(s) as directed.   If you believe that any of the medications

## (undated) NOTE — LETTER
[address]  Authorization for Invasive Procedure                                                                                           1. I hereby authorize Kylee Bragg MD, my physician and his/her assistants (if applicable), which may include medical students, residents, and/or fellows, to perform the following surgical operation/ procedure and administer such anesthesia as may be determined necessary by my physician: Operation/Procedure name (s) ESOPHAGOGASTRODUODENOSCOPY (EGD) on Arleta Music   2. I recognize that during the surgical operation/procedure, unforeseen conditions may necessitate additional or different procedures than those listed above. I, therefore, further authorize and request that the above-named surgeon, assistants, or designees perform such procedures as are, in their judgment, necessary and desirable. 3.   My surgeon/physician has discussed prior to my surgery the potential benefits, risks and side effects of this procedure; the likelihood of achieving goals; and potential problems that might occur during recuperation. They also discussed reasonable alternatives to the procedure, including risks, benefits, and side effects related to the alternatives and risks related to not receiving this procedure. I have had all my questions answered and I acknowledge that no guarantee has been made as to the result that may be obtained. 4.   Should the need arise during my operation/procedure, which includes change of level of care prior to discharge, I also consent to the administration of blood and/or blood products. Further, I understand that despite careful testing and screening of blood or blood products by collecting agencies, I may still be subject to ill effects as a result of receiving a blood transfusion and/or blood products.   The following are some, but not all, of the potential risks that can occur: fever and allergic reactions, hemolytic reactions, transmission of diseases such as Hepatitis, AIDS and Cytomegalovirus (CMV) and fluid overload. In the event that I wish to have an autologous transfusion of my own blood, or a directed donor transfusion, I will discuss this with my physician. Check only if Refusing Blood or Blood Products  I understand refusal of blood or blood products as deemed necessary by my physician may have serious consequences to my condition to include possible death. I hereby assume responsibility for my refusal and release the hospital, its personnel, and my physicians from any responsibility for the consequences of my refusal.    o  Refuse   5. I authorize the use of any specimen, organs, tissues, body parts or foreign objects that may be removed from my body during the operation/procedure for diagnosis, research or teaching purposes and their subsequent disposal by hospital authorities. I also authorize the release of specimen test results and/or written reports to my treating physician on the hospital medical staff or other referring or consulting physicians involved in my care, at the discretion of the Pathologist or my treating physician. 6.   I consent to the photographing or videotaping of the operations or procedures to be performed, including appropriate portions of my body for medical, scientific, or educational purposes, provided my identity is not revealed by the pictures or by descriptive texts accompanying them. If the procedure has been photographed/videotaped, the surgeon will obtain the original picture, image, videotape or CD. The hospital will not be responsible for storage, release or maintenance of the picture, image, tape or CD.    7.   I consent to the presence of a  or observers in the operating room as deemed necessary by my physician or their designees.     8.   I recognize that in the event my procedure results in extended X-Ray/fluoroscopy time, I may develop a skin reaction. 9. If I have a Do Not Attempt Resuscitation (DNAR) order in place, that status will be suspended while in the operating room, procedural suite, and during the recovery period unless otherwise explicitly stated by me (or a person authorized to consent on my behalf). The surgeon or my attending physician will determine when the applicable recovery period ends for purposes of reinstating the DNAR order. 10. Patients having a sterilization procedure: I understand that if the procedure is successful the results will be permanent and it will therefore be impossible for me to inseminate, conceive, or bear children. I also understand that the procedure is intended to result in sterility, although the result has not been guaranteed. 11. I acknowledge that my physician has explained sedation/analgesia administration to me including the risk and benefits I consent to the administration of sedation/analgesia as may be necessary or desirable in the judgment of my physician. I CERTIFY THAT I HAVE READ AND FULLY UNDERSTAND THE ABOVE CONSENT TO OPERATION and/or OTHER PROCEDURE.     _________________________________________ _________________________________     ___________________________________  Signature of Patient     Signature of Responsible Person                   Printed Name of Responsible Person                              _________________________________________ ______________________________        ___________________________________  Signature of Witness         Date  Time         Relationship to Patient    STATEMENT OF PHYSICIAN My signature below affirms that prior to the time of the procedure; I have explained to the patient and/or his/her legal representative, the risks and benefits involved in the proposed treatment and any reasonable alternative to the proposed treatment.  I have also explained the risks and benefits involved in refusal of the proposed treatment and alternatives to the proposed treatment and have answered the patient's questions.  If I have a significant financial interest in a co-management agreement or a significant financial interest in any product or implant, or other significant relationship used in this procedure/surgery, I have disclosed this and had a discussion with my patient.     _______________________________________________________________ _____________________________  Olga Lidia Stewardck of Physician)                                                                                         (Date)                                   (Time)  Patient Name: Amalia Edouard    : 1990   Printed: 2022      Medical Record #: [id number]                                              Page 1 of 1

## (undated) NOTE — LETTER
Date: 1/2/2020    Patient Name: Merriam Cowden          To Whom it may concern:    Emi Serrato is my patient and was seen in my clinic today. She is suffering from chronic right wrist pain, likely from tendinitis, and is currently undergoing treatment.  In ord

## (undated) NOTE — MR AVS SNAPSHOT
After Visit Summary   10/5/2020    Chao Maurer    MRN: [id number]           Visit Information     Date & Time  10/5/2020  9:30 AM Provider  Shant De La Garza MD Department  [address] [place] [address] Dept.  Phone  3 Contraceptive education   [[id number]]             Follow-up    Return in about 1 year (around 10/5/2021) for Annual exam.     We Ordered the Following     Normal Orders This Visit    CHLAMYDIA/GONOCOCCUS, LIZ [[id number] CUSTOM]     HPV HIGH RISK , THIN PREP COL Communicate with a [facility] Physician or [facility] online. The physician will respond and provide   a treatment plan within a few hours.  ONLINE VISIT  Primary Care Providers  Treatment for mild illness or injury that does not require immediate attention VIDEO VISITS

## (undated) NOTE — ED AVS SNAPSHOT
Ms. Zack Varner   MRN: [id number]    Department:  [facility] Emergency Department   Date of Visit:  9/27/2019           Disclosure     Insurance plans vary and the physician(s) referred by the ER may not be covered by your plan.  Please con CARE PHYSICIAN AT ONCE OR RETURN IMMEDIATELY TO THE EMERGENCY DEPARTMENT. If you have been prescribed any medication(s), please fill your prescription right away and begin taking the medication(s) as directed.   If you believe that any of the medications

## (undated) NOTE — LETTER
Date & Time: 2/17/2020, 10:38 AM  Patient: Xiomara Kyle  Encounter Provider(s):    Estrella More MD       To Whom It May Concern:    Max Espinoza was seen and treated in our department on 2/17/2020.  She should not return to work until 02/18/202

## (undated) NOTE — ED AVS SNAPSHOT
Ms. Smith Dose   MRN: [id number]    Department:  [facility] Emergency Department   Date of Visit:  2/27/2020           Disclosure     Insurance plans vary and the physician(s) referred by the ER may not be covered by your plan.  Please con CARE PHYSICIAN AT ONCE OR RETURN IMMEDIATELY TO THE EMERGENCY DEPARTMENT. If you have been prescribed any medication(s), please fill your prescription right away and begin taking the medication(s) as directed.   If you believe that any of the medications

## (undated) NOTE — ED AVS SNAPSHOT
Ms. Alix Selby   MRN: [id number]    Department:  [facility] Emergency Department   Date of Visit:  10/10/2018           Disclosure     Insurance plans vary and the physician(s) referred by the ER may not be covered by your plan.  Please co CARE PHYSICIAN AT ONCE OR RETURN IMMEDIATELY TO THE EMERGENCY DEPARTMENT. If you have been prescribed any medication(s), please fill your prescription right away and begin taking the medication(s) as directed.   If you believe that any of the medications

## (undated) NOTE — ED AVS SNAPSHOT
Ms. Zenaida Collet   MRN: [id number]    Department:  [facility] Emergency Department   Date of Visit:  5/23/2018           Disclosure     Insurance plans vary and the physician(s) referred by the ER may not be covered by your plan.  Please con CARE PHYSICIAN AT ONCE OR RETURN IMMEDIATELY TO THE EMERGENCY DEPARTMENT. If you have been prescribed any medication(s), please fill your prescription right away and begin taking the medication(s) as directed.   If you believe that any of the medications